# Patient Record
Sex: MALE | Race: WHITE | Employment: OTHER | URBAN - METROPOLITAN AREA
[De-identification: names, ages, dates, MRNs, and addresses within clinical notes are randomized per-mention and may not be internally consistent; named-entity substitution may affect disease eponyms.]

---

## 2017-03-28 ENCOUNTER — COMMUNICATION - HEALTHEAST (OUTPATIENT)
Dept: CARDIOLOGY | Facility: CLINIC | Age: 77
End: 2017-03-28

## 2017-03-28 DIAGNOSIS — I48.91 ATRIAL FIBRILLATION (H): ICD-10-CM

## 2017-05-09 ENCOUNTER — AMBULATORY - HEALTHEAST (OUTPATIENT)
Dept: CARDIOLOGY | Facility: CLINIC | Age: 77
End: 2017-05-09

## 2017-05-12 ENCOUNTER — OFFICE VISIT - HEALTHEAST (OUTPATIENT)
Dept: CARDIOLOGY | Facility: CLINIC | Age: 77
End: 2017-05-12

## 2017-05-12 ENCOUNTER — AMBULATORY - HEALTHEAST (OUTPATIENT)
Dept: CARDIOLOGY | Facility: CLINIC | Age: 77
End: 2017-05-12

## 2017-05-12 DIAGNOSIS — I25.10 CAD (CORONARY ARTERY DISEASE): ICD-10-CM

## 2017-05-12 LAB
ATRIAL RATE - MUSE: 394 BPM
DIASTOLIC BLOOD PRESSURE - MUSE: NORMAL MMHG
INTERPRETATION ECG - MUSE: NORMAL
P AXIS - MUSE: NORMAL DEGREES
PR INTERVAL - MUSE: NORMAL MS
QRS DURATION - MUSE: 138 MS
QT - MUSE: 524 MS
QTC - MUSE: 468 MS
R AXIS - MUSE: -34 DEGREES
SYSTOLIC BLOOD PRESSURE - MUSE: NORMAL MMHG
T AXIS - MUSE: 10 DEGREES
VENTRICULAR RATE- MUSE: 48 BPM

## 2017-05-12 ASSESSMENT — MIFFLIN-ST. JEOR: SCORE: 1828.9

## 2017-05-22 ENCOUNTER — HOSPITAL ENCOUNTER (OUTPATIENT)
Dept: NUCLEAR MEDICINE | Facility: HOSPITAL | Age: 77
Discharge: HOME | End: 2017-05-22
Attending: INTERNAL MEDICINE

## 2017-05-22 ENCOUNTER — HOSPITAL ENCOUNTER (OUTPATIENT)
Dept: CARDIOLOGY | Facility: HOSPITAL | Age: 77
Discharge: HOME | End: 2017-05-22
Attending: INTERNAL MEDICINE

## 2017-05-22 DIAGNOSIS — I25.10 CAD (CORONARY ARTERY DISEASE): ICD-10-CM

## 2017-05-22 LAB
CV STRESS CURRENT BP HE: NORMAL
CV STRESS CURRENT HR HE: 57
CV STRESS CURRENT HR HE: 62
CV STRESS CURRENT HR HE: 64
CV STRESS CURRENT HR HE: 65
CV STRESS CURRENT HR HE: 66
CV STRESS CURRENT HR HE: 67
CV STRESS CURRENT HR HE: 68
CV STRESS CURRENT HR HE: 68
CV STRESS CURRENT HR HE: 69
CV STRESS CURRENT HR HE: 70
CV STRESS CURRENT HR HE: 70
CV STRESS CURRENT HR HE: 74
CV STRESS CURRENT HR HE: 74
CV STRESS CURRENT HR HE: 75
CV STRESS CURRENT HR HE: 77
CV STRESS CURRENT HR HE: 78
CV STRESS DEVIATION TIME HE: NORMAL
CV STRESS ECHO PERCENT HR HE: NORMAL
CV STRESS EXERCISE STAGE HE: NORMAL
CV STRESS FINAL RESTING BP HE: NORMAL
CV STRESS FINAL RESTING HR HE: 69
CV STRESS MAX HR HE: 79
CV STRESS MAX TREADMILL GRADE HE: 0
CV STRESS MAX TREADMILL SPEED HE: 0
CV STRESS PEAK DIA BP HE: NORMAL
CV STRESS PEAK SYS BP HE: NORMAL
CV STRESS PHASE HE: NORMAL
CV STRESS PROTOCOL HE: NORMAL
CV STRESS RESTING PT POSITION HE: NORMAL
CV STRESS ST DEVIATION AMOUNT HE: NORMAL
CV STRESS ST DEVIATION ELEVATION HE: NORMAL
CV STRESS ST EVELATION AMOUNT HE: NORMAL
CV STRESS TEST TYPE HE: NORMAL
CV STRESS TOTAL STAGE TIME MIN 1 HE: NORMAL
NUC STRESS EJECTION FRACTION: 53 %
STRESS ECHO BASELINE BP: NORMAL
STRESS ECHO BASELINE HR: 66
STRESS ECHO CALCULATED PERCENT HR: 56 %
STRESS ECHO LAST STRESS BP: NORMAL
STRESS ECHO LAST STRESS HR: 74

## 2017-06-08 ENCOUNTER — OFFICE VISIT - HEALTHEAST (OUTPATIENT)
Dept: CARDIOLOGY | Facility: CLINIC | Age: 77
End: 2017-06-08

## 2017-06-08 DIAGNOSIS — I42.9 CARDIOMYOPATHY (H): ICD-10-CM

## 2017-06-08 DIAGNOSIS — I48.21 PERMANENT ATRIAL FIBRILLATION (H): ICD-10-CM

## 2017-06-08 DIAGNOSIS — I25.10 ATHEROSCLEROSIS OF NATIVE CORONARY ARTERY OF NATIVE HEART, ANGINA PRESENCE UNSPECIFIED: ICD-10-CM

## 2017-06-08 DIAGNOSIS — I10 ESSENTIAL HYPERTENSION WITH GOAL BLOOD PRESSURE LESS THAN 130/85: ICD-10-CM

## 2017-06-08 ASSESSMENT — MIFFLIN-ST. JEOR: SCORE: 1833.44

## 2017-06-16 ENCOUNTER — COMMUNICATION - HEALTHEAST (OUTPATIENT)
Dept: CARDIOLOGY | Facility: CLINIC | Age: 77
End: 2017-06-16

## 2017-08-03 ENCOUNTER — RECORDS - HEALTHEAST (OUTPATIENT)
Dept: LAB | Facility: CLINIC | Age: 77
End: 2017-08-03

## 2017-08-03 LAB
CHOLEST SERPL-MCNC: 118 MG/DL
FASTING STATUS PATIENT QL REPORTED: NORMAL
HDLC SERPL-MCNC: 43 MG/DL
LDLC SERPL CALC-MCNC: 56 MG/DL
PSA SERPL-MCNC: 2.5 NG/ML (ref 0–6.5)
TRIGL SERPL-MCNC: 97 MG/DL

## 2017-09-15 ENCOUNTER — COMMUNICATION - HEALTHEAST (OUTPATIENT)
Dept: CARDIOLOGY | Facility: CLINIC | Age: 77
End: 2017-09-15

## 2017-09-15 DIAGNOSIS — E78.5 HYPERLIPIDEMIA: ICD-10-CM

## 2017-09-20 ENCOUNTER — RECORDS - HEALTHEAST (OUTPATIENT)
Dept: ADMINISTRATIVE | Facility: OTHER | Age: 77
End: 2017-09-20

## 2017-09-20 ENCOUNTER — AMBULATORY - HEALTHEAST (OUTPATIENT)
Dept: CARDIOLOGY | Facility: CLINIC | Age: 77
End: 2017-09-20

## 2017-09-25 ENCOUNTER — OFFICE VISIT - HEALTHEAST (OUTPATIENT)
Dept: CARDIOLOGY | Facility: CLINIC | Age: 77
End: 2017-09-25

## 2017-09-25 DIAGNOSIS — I48.19 PERSISTENT ATRIAL FIBRILLATION (H): ICD-10-CM

## 2017-09-25 DIAGNOSIS — I25.10 CAD (CORONARY ARTERY DISEASE): ICD-10-CM

## 2017-09-25 DIAGNOSIS — I25.10 ATHEROSCLEROSIS OF NATIVE CORONARY ARTERY OF NATIVE HEART, ANGINA PRESENCE UNSPECIFIED: ICD-10-CM

## 2017-09-25 DIAGNOSIS — E78.5 HYPERLIPIDEMIA LDL GOAL <70: ICD-10-CM

## 2017-09-25 LAB
ATRIAL RATE - MUSE: 38 BPM
DIASTOLIC BLOOD PRESSURE - MUSE: NORMAL MMHG
INTERPRETATION ECG - MUSE: NORMAL
P AXIS - MUSE: NORMAL DEGREES
PR INTERVAL - MUSE: NORMAL MS
QRS DURATION - MUSE: 136 MS
QT - MUSE: 496 MS
QTC - MUSE: 482 MS
R AXIS - MUSE: -37 DEGREES
SYSTOLIC BLOOD PRESSURE - MUSE: NORMAL MMHG
T AXIS - MUSE: 17 DEGREES
VENTRICULAR RATE- MUSE: 57 BPM

## 2017-09-25 ASSESSMENT — MIFFLIN-ST. JEOR: SCORE: 1878.8

## 2017-10-02 ENCOUNTER — HOSPITAL ENCOUNTER (OUTPATIENT)
Dept: CARDIOLOGY | Facility: HOSPITAL | Age: 77
Discharge: HOME | End: 2017-10-02
Attending: INTERNAL MEDICINE

## 2017-10-02 DIAGNOSIS — I48.19 PERSISTENT ATRIAL FIBRILLATION (H): ICD-10-CM

## 2017-10-17 ENCOUNTER — COMMUNICATION - HEALTHEAST (OUTPATIENT)
Dept: CARDIOLOGY | Facility: CLINIC | Age: 77
End: 2017-10-17

## 2017-10-17 DIAGNOSIS — I48.91 ATRIAL FIBRILLATION (H): ICD-10-CM

## 2017-10-24 ENCOUNTER — COMMUNICATION - HEALTHEAST (OUTPATIENT)
Dept: CARDIOLOGY | Facility: CLINIC | Age: 77
End: 2017-10-24

## 2017-11-02 ENCOUNTER — COMMUNICATION - HEALTHEAST (OUTPATIENT)
Dept: CARDIOLOGY | Facility: CLINIC | Age: 77
End: 2017-11-02

## 2017-11-02 DIAGNOSIS — I25.10 CAD (CORONARY ARTERY DISEASE): ICD-10-CM

## 2017-11-13 ENCOUNTER — COMMUNICATION - HEALTHEAST (OUTPATIENT)
Dept: CARDIOLOGY | Facility: CLINIC | Age: 77
End: 2017-11-13

## 2017-11-13 DIAGNOSIS — I25.10 CORONARY ATHEROSCLEROSIS: ICD-10-CM

## 2018-03-19 ENCOUNTER — COMMUNICATION - HEALTHEAST (OUTPATIENT)
Dept: CARDIOLOGY | Facility: CLINIC | Age: 78
End: 2018-03-19

## 2018-03-19 DIAGNOSIS — E78.5 HYPERLIPIDEMIA: ICD-10-CM

## 2018-04-16 ENCOUNTER — COMMUNICATION - HEALTHEAST (OUTPATIENT)
Dept: CARDIOLOGY | Facility: CLINIC | Age: 78
End: 2018-04-16

## 2018-04-16 DIAGNOSIS — I48.91 ATRIAL FIBRILLATION (H): ICD-10-CM

## 2018-06-05 ENCOUNTER — RECORDS - HEALTHEAST (OUTPATIENT)
Dept: ADMINISTRATIVE | Facility: OTHER | Age: 78
End: 2018-06-05

## 2018-06-07 ENCOUNTER — OFFICE VISIT - HEALTHEAST (OUTPATIENT)
Dept: CARDIOLOGY | Facility: CLINIC | Age: 78
End: 2018-06-07

## 2018-06-07 DIAGNOSIS — I50.32 CHF (CONGESTIVE HEART FAILURE), NYHA CLASS II, CHRONIC, DIASTOLIC (H): ICD-10-CM

## 2018-06-07 DIAGNOSIS — I25.10 CAD (CORONARY ARTERY DISEASE): ICD-10-CM

## 2018-06-07 DIAGNOSIS — R60.0 LOWER EXTREMITY EDEMA: ICD-10-CM

## 2018-06-07 LAB
ANION GAP SERPL CALCULATED.3IONS-SCNC: 10 MMOL/L (ref 5–18)
BNP SERPL-MCNC: 277 PG/ML (ref 0–84)
BUN SERPL-MCNC: 14 MG/DL (ref 8–28)
CALCIUM SERPL-MCNC: 9.6 MG/DL (ref 8.5–10.5)
CHLORIDE BLD-SCNC: 106 MMOL/L (ref 98–107)
CO2 SERPL-SCNC: 23 MMOL/L (ref 22–31)
CREAT SERPL-MCNC: 0.93 MG/DL (ref 0.7–1.3)
GFR SERPL CREATININE-BSD FRML MDRD: >60 ML/MIN/1.73M2
GLUCOSE BLD-MCNC: 102 MG/DL (ref 70–125)
MAGNESIUM SERPL-MCNC: 2.1 MG/DL (ref 1.8–2.6)
POTASSIUM BLD-SCNC: 4.7 MMOL/L (ref 3.5–5)
SODIUM SERPL-SCNC: 139 MMOL/L (ref 136–145)

## 2018-06-07 ASSESSMENT — MIFFLIN-ST. JEOR: SCORE: 1865.19

## 2018-06-08 ENCOUNTER — AMBULATORY - HEALTHEAST (OUTPATIENT)
Dept: CARDIOLOGY | Facility: CLINIC | Age: 78
End: 2018-06-08

## 2018-06-08 DIAGNOSIS — I50.23 ACUTE ON CHRONIC SYSTOLIC CONGESTIVE HEART FAILURE (H): ICD-10-CM

## 2018-06-08 DIAGNOSIS — I42.9 CARDIOMYOPATHY (H): ICD-10-CM

## 2018-06-15 ENCOUNTER — RECORDS - HEALTHEAST (OUTPATIENT)
Dept: LAB | Facility: CLINIC | Age: 78
End: 2018-06-15

## 2018-06-15 LAB
ALBUMIN SERPL-MCNC: 4 G/DL (ref 3.5–5)
ALP SERPL-CCNC: 93 U/L (ref 45–120)
ALT SERPL W P-5'-P-CCNC: 21 U/L (ref 0–45)
ANION GAP SERPL CALCULATED.3IONS-SCNC: 7 MMOL/L (ref 5–18)
AST SERPL W P-5'-P-CCNC: 24 U/L (ref 0–40)
BILIRUB SERPL-MCNC: 2.2 MG/DL (ref 0–1)
BUN SERPL-MCNC: 17 MG/DL (ref 8–28)
CALCIUM SERPL-MCNC: 9.5 MG/DL (ref 8.5–10.5)
CHLORIDE BLD-SCNC: 102 MMOL/L (ref 98–107)
CHOLEST SERPL-MCNC: 126 MG/DL
CO2 SERPL-SCNC: 26 MMOL/L (ref 22–31)
CREAT SERPL-MCNC: 1.05 MG/DL (ref 0.7–1.3)
FASTING STATUS PATIENT QL REPORTED: NORMAL
GFR SERPL CREATININE-BSD FRML MDRD: >60 ML/MIN/1.73M2
GLUCOSE BLD-MCNC: 133 MG/DL (ref 70–125)
HDLC SERPL-MCNC: 52 MG/DL
LDLC SERPL CALC-MCNC: 55 MG/DL
POTASSIUM BLD-SCNC: 4.6 MMOL/L (ref 3.5–5)
PROT SERPL-MCNC: 7.6 G/DL (ref 6–8)
PSA SERPL-MCNC: 2.8 NG/ML (ref 0–6.5)
SODIUM SERPL-SCNC: 135 MMOL/L (ref 136–145)
TRIGL SERPL-MCNC: 93 MG/DL
TSH SERPL DL<=0.005 MIU/L-ACNC: 2.69 UIU/ML (ref 0.3–5)

## 2018-06-19 ENCOUNTER — AMBULATORY - HEALTHEAST (OUTPATIENT)
Dept: CARDIOLOGY | Facility: CLINIC | Age: 78
End: 2018-06-19

## 2018-06-19 ENCOUNTER — OFFICE VISIT - HEALTHEAST (OUTPATIENT)
Dept: CARDIOLOGY | Facility: CLINIC | Age: 78
End: 2018-06-19

## 2018-06-19 DIAGNOSIS — I48.19 PERSISTENT ATRIAL FIBRILLATION (H): ICD-10-CM

## 2018-06-19 DIAGNOSIS — I42.9 CARDIOMYOPATHY (H): ICD-10-CM

## 2018-06-19 DIAGNOSIS — I50.23 ACUTE ON CHRONIC SYSTOLIC CONGESTIVE HEART FAILURE (H): ICD-10-CM

## 2018-06-19 DIAGNOSIS — I50.32 CHRONIC DIASTOLIC CONGESTIVE HEART FAILURE (H): ICD-10-CM

## 2018-06-19 DIAGNOSIS — I50.32 CHF (CONGESTIVE HEART FAILURE), NYHA CLASS II, CHRONIC, DIASTOLIC (H): ICD-10-CM

## 2018-06-19 DIAGNOSIS — I10 ESSENTIAL HYPERTENSION: ICD-10-CM

## 2018-06-19 LAB — MAGNESIUM SERPL-MCNC: 1.9 MG/DL (ref 1.8–2.6)

## 2018-06-19 ASSESSMENT — MIFFLIN-ST. JEOR: SCORE: 1871.99

## 2018-06-27 ENCOUNTER — COMMUNICATION - HEALTHEAST (OUTPATIENT)
Dept: CARDIOLOGY | Facility: CLINIC | Age: 78
End: 2018-06-27

## 2018-06-27 DIAGNOSIS — R06.02 SOB (SHORTNESS OF BREATH): ICD-10-CM

## 2018-06-27 DIAGNOSIS — R53.83 FATIGUE: ICD-10-CM

## 2018-06-27 DIAGNOSIS — I48.91 A-FIB (H): ICD-10-CM

## 2018-07-18 ENCOUNTER — HOSPITAL ENCOUNTER (OUTPATIENT)
Dept: CARDIOLOGY | Facility: HOSPITAL | Age: 78
Discharge: HOME | End: 2018-07-18
Attending: NURSE PRACTITIONER

## 2018-07-18 DIAGNOSIS — R06.02 SOB (SHORTNESS OF BREATH): ICD-10-CM

## 2018-07-18 DIAGNOSIS — R53.83 FATIGUE: ICD-10-CM

## 2018-07-18 DIAGNOSIS — I48.91 A-FIB (H): ICD-10-CM

## 2018-07-18 ASSESSMENT — MIFFLIN-ST. JEOR: SCORE: 1871.99

## 2018-07-19 LAB
AORTIC ROOT: 3.3 CM
AORTIC VALVE MEAN VELOCITY: 82.8 CM/S
AV CUSP SEPERATION: 2 CM
AV CUSP SEPERATION: 2 CM
AV DIMENSIONLESS INDEX VTI: 0.7
AV MEAN GRADIENT: 3 MMHG
AV PEAK GRADIENT: 4.5 MMHG
AV VALVE AREA: 2.5 CM2
BSA FOR ECHO PROCEDURE: 2.4 M2
CV ECHO HEIGHT: 74 IN
CV ECHO WEIGHT: 243 LBS
DOP CALC AO PEAK VEL: 106 CM/S
DOP CALC AO VTI: 22.7 CM
DOP CALC LVOT AREA: 3.8 CM2
DOP CALC LVOT DIAMETER: 2.2 CM
DOP CALC LVOT STROKE VOLUME: 57.4 CM3
DOP CALC MV VTI: 27.1 CM
DOP CALCLVOT PEAK VEL VTI: 15.1 CM
ECHO EJECTION FRACTION ESTIMATED: 40 %
FRACTIONAL SHORTENING: 16.7 % (ref 28–44)
INTERVENTRICULAR SEPTUM IN END DIASTOLE: 1 CM (ref 0.6–1)
IVS/PW RATIO: 1
LA AREA 1: 25.9 CM2
LA AREA 2: 24.6 CM2
LEFT ATRIUM LENGTH: 5.85 CM
LEFT ATRIUM SIZE: 4.9 CM
LEFT ATRIUM VOLUME INDEX: 38.6 ML/M2
LEFT ATRIUM VOLUME: 92.6 ML
LEFT VENTRICLE CARDIAC INDEX: 1.5 L/MIN/M2
LEFT VENTRICLE CARDIAC OUTPUT: 3.5 L/MIN
LEFT VENTRICLE HEART RATE: 61 BPM
LEFT VENTRICLE MASS INDEX: 70.9 G/M2
LEFT VENTRICULAR INTERNAL DIMENSION IN DIASTOLE: 4.8 CM (ref 4.2–5.8)
LEFT VENTRICULAR INTERNAL DIMENSION IN SYSTOLE: 4 CM (ref 2.5–4)
LEFT VENTRICULAR MASS: 170.2 G
LEFT VENTRICULAR OUTFLOW TRACT MEAN GRADIENT: 1 MMHG
LEFT VENTRICULAR OUTFLOW TRACT MEAN VELOCITY: 47.3 CM/S
LEFT VENTRICULAR POSTERIOR WALL IN END DIASTOLE: 1 CM (ref 0.6–1)
LV STROKE VOLUME INDEX: 23.9 ML/M2
MITRAL VALVE DECELERATION SLOPE: 9230 MM/S2
MITRAL VALVE MEAN INFLOW VELOCITY: 51.5 CM/S
MITRAL VALVE PEAK VELOCITY: 138 CM/S
MITRAL VALVE PRESSURE HALF-TIME: 45 MS
MV AREA VTI: 2.12 CM2
MV AVERAGE E/E' RATIO: 10.1 CM/S
MV DECELERATION TIME: 151 MS
MV E'TISSUE VEL-LAT: 13.6 CM/S
MV E'TISSUE VEL-MED: 10.5 CM/S
MV LATERAL E/E' RATIO: 9
MV MEAN GRADIENT: 2 MMHG
MV MEDIAL E/E' RATIO: 11.6
MV PEAK E VELOCITY: 122 CM/S
MV PEAK GRADIENT: 7.6 MMHG
MV VALVE AREA BY CONTINUITY EQUATION: 2.1 CM2
MV VALVE AREA PRESSURE 1/2 METHOD: 4.9 CM2
NUC REST DIASTOLIC VOLUME INDEX: 3888 LBS
NUC REST SYSTOLIC VOLUME INDEX: 74 IN
TRICUSPID REGURGITATION PEAK PRESSURE GRADIENT: 26.6 MMHG
TRICUSPID VALVE PEAK REGURGITANT VELOCITY: 258 CM/S

## 2018-08-10 ENCOUNTER — COMMUNICATION - HEALTHEAST (OUTPATIENT)
Dept: CARDIOLOGY | Facility: CLINIC | Age: 78
End: 2018-08-10

## 2018-08-10 DIAGNOSIS — I25.10 CAD (CORONARY ARTERY DISEASE): ICD-10-CM

## 2018-08-13 ENCOUNTER — AMBULATORY - HEALTHEAST (OUTPATIENT)
Dept: CARDIOLOGY | Facility: CLINIC | Age: 78
End: 2018-08-13

## 2018-08-13 ENCOUNTER — RECORDS - HEALTHEAST (OUTPATIENT)
Dept: ADMINISTRATIVE | Facility: OTHER | Age: 78
End: 2018-08-13

## 2018-08-14 ENCOUNTER — COMMUNICATION - HEALTHEAST (OUTPATIENT)
Dept: CARDIOLOGY | Facility: CLINIC | Age: 78
End: 2018-08-14

## 2018-08-14 DIAGNOSIS — I25.10 CORONARY ATHEROSCLEROSIS: ICD-10-CM

## 2018-08-15 ENCOUNTER — OFFICE VISIT - HEALTHEAST (OUTPATIENT)
Dept: CARDIOLOGY | Facility: CLINIC | Age: 78
End: 2018-08-15

## 2018-08-15 DIAGNOSIS — I10 ESSENTIAL HYPERTENSION: ICD-10-CM

## 2018-08-15 DIAGNOSIS — I50.32 CHRONIC DIASTOLIC CONGESTIVE HEART FAILURE (H): ICD-10-CM

## 2018-08-15 DIAGNOSIS — I42.9 CARDIOMYOPATHY (H): ICD-10-CM

## 2018-08-15 DIAGNOSIS — I48.19 PERSISTENT ATRIAL FIBRILLATION (H): ICD-10-CM

## 2018-08-15 LAB
ANION GAP SERPL CALCULATED.3IONS-SCNC: 10 MMOL/L (ref 5–18)
BUN SERPL-MCNC: 16 MG/DL (ref 8–28)
CALCIUM SERPL-MCNC: 9.8 MG/DL (ref 8.5–10.5)
CHLORIDE BLD-SCNC: 101 MMOL/L (ref 98–107)
CO2 SERPL-SCNC: 23 MMOL/L (ref 22–31)
CREAT SERPL-MCNC: 1.04 MG/DL (ref 0.7–1.3)
GFR SERPL CREATININE-BSD FRML MDRD: >60 ML/MIN/1.73M2
GLUCOSE BLD-MCNC: 109 MG/DL (ref 70–125)
MAGNESIUM SERPL-MCNC: 1.9 MG/DL (ref 1.8–2.6)
POTASSIUM BLD-SCNC: 4.9 MMOL/L (ref 3.5–5)
SODIUM SERPL-SCNC: 134 MMOL/L (ref 136–145)

## 2018-08-15 ASSESSMENT — MIFFLIN-ST. JEOR: SCORE: 1828.9

## 2018-08-16 ENCOUNTER — COMMUNICATION - HEALTHEAST (OUTPATIENT)
Dept: CARDIOLOGY | Facility: CLINIC | Age: 78
End: 2018-08-16

## 2018-08-17 ENCOUNTER — COMMUNICATION - HEALTHEAST (OUTPATIENT)
Dept: CARDIOLOGY | Facility: CLINIC | Age: 78
End: 2018-08-17

## 2018-10-14 ENCOUNTER — COMMUNICATION - HEALTHEAST (OUTPATIENT)
Dept: CARDIOLOGY | Facility: CLINIC | Age: 78
End: 2018-10-14

## 2018-10-14 DIAGNOSIS — I48.91 ATRIAL FIBRILLATION (H): ICD-10-CM

## 2018-10-18 ENCOUNTER — COMMUNICATION - HEALTHEAST (OUTPATIENT)
Dept: CARDIOLOGY | Facility: CLINIC | Age: 78
End: 2018-10-18

## 2018-10-26 ENCOUNTER — RECORDS - HEALTHEAST (OUTPATIENT)
Dept: ADMINISTRATIVE | Facility: OTHER | Age: 78
End: 2018-10-26

## 2018-12-05 ENCOUNTER — COMMUNICATION - HEALTHEAST (OUTPATIENT)
Dept: CARDIOLOGY | Facility: CLINIC | Age: 78
End: 2018-12-05

## 2018-12-05 DIAGNOSIS — I25.10 CAD (CORONARY ARTERY DISEASE): ICD-10-CM

## 2018-12-05 RX ORDER — NITROGLYCERIN 0.4 MG/1
TABLET SUBLINGUAL
Qty: 3 BOTTLE | Refills: 3 | Status: SHIPPED | OUTPATIENT
Start: 2018-12-05 | End: 2022-07-28

## 2018-12-16 ENCOUNTER — COMMUNICATION - HEALTHEAST (OUTPATIENT)
Dept: CARDIOLOGY | Facility: CLINIC | Age: 78
End: 2018-12-16

## 2018-12-16 DIAGNOSIS — E78.5 HYPERLIPIDEMIA: ICD-10-CM

## 2018-12-17 ENCOUNTER — COMMUNICATION - HEALTHEAST (OUTPATIENT)
Dept: CARDIOLOGY | Facility: CLINIC | Age: 78
End: 2018-12-17

## 2018-12-17 DIAGNOSIS — E78.00 HYPERCHOLESTEROLEMIA: ICD-10-CM

## 2019-01-30 ENCOUNTER — COMMUNICATION - HEALTHEAST (OUTPATIENT)
Dept: CARDIOLOGY | Facility: CLINIC | Age: 79
End: 2019-01-30

## 2019-01-30 DIAGNOSIS — I50.32 CHF (CONGESTIVE HEART FAILURE), NYHA CLASS II, CHRONIC, DIASTOLIC (H): ICD-10-CM

## 2019-02-04 ENCOUNTER — COMMUNICATION - HEALTHEAST (OUTPATIENT)
Dept: CARDIOLOGY | Facility: CLINIC | Age: 79
End: 2019-02-04

## 2019-02-04 DIAGNOSIS — I25.10 CAD (CORONARY ARTERY DISEASE): ICD-10-CM

## 2019-04-15 ENCOUNTER — COMMUNICATION - HEALTHEAST (OUTPATIENT)
Dept: CARDIOLOGY | Facility: CLINIC | Age: 79
End: 2019-04-15

## 2019-04-15 DIAGNOSIS — I48.91 ATRIAL FIBRILLATION (H): ICD-10-CM

## 2019-05-18 ENCOUNTER — COMMUNICATION - HEALTHEAST (OUTPATIENT)
Dept: CARDIOLOGY | Facility: CLINIC | Age: 79
End: 2019-05-18

## 2019-05-18 DIAGNOSIS — I48.91 ATRIAL FIBRILLATION (H): ICD-10-CM

## 2019-05-21 ENCOUNTER — COMMUNICATION - HEALTHEAST (OUTPATIENT)
Dept: CARDIOLOGY | Facility: CLINIC | Age: 79
End: 2019-05-21

## 2019-05-21 DIAGNOSIS — I25.10 CORONARY ATHEROSCLEROSIS: ICD-10-CM

## 2019-05-29 ENCOUNTER — RECORDS - HEALTHEAST (OUTPATIENT)
Dept: LAB | Facility: CLINIC | Age: 79
End: 2019-05-29

## 2019-05-29 LAB
ALBUMIN SERPL-MCNC: 3.8 G/DL (ref 3.5–5)
ALP SERPL-CCNC: 149 U/L (ref 45–120)
ALT SERPL W P-5'-P-CCNC: 23 U/L (ref 0–45)
ANION GAP SERPL CALCULATED.3IONS-SCNC: 11 MMOL/L (ref 5–18)
AST SERPL W P-5'-P-CCNC: 31 U/L (ref 0–40)
BILIRUB SERPL-MCNC: 2.1 MG/DL (ref 0–1)
BNP SERPL-MCNC: 441 PG/ML (ref 0–86)
BUN SERPL-MCNC: 16 MG/DL (ref 8–28)
CALCIUM SERPL-MCNC: 9.2 MG/DL (ref 8.5–10.5)
CHLORIDE BLD-SCNC: 94 MMOL/L (ref 98–107)
CHOLEST SERPL-MCNC: 104 MG/DL
CO2 SERPL-SCNC: 22 MMOL/L (ref 22–31)
CREAT SERPL-MCNC: 0.82 MG/DL (ref 0.7–1.3)
FASTING STATUS PATIENT QL REPORTED: NORMAL
GFR SERPL CREATININE-BSD FRML MDRD: >60 ML/MIN/1.73M2
GLUCOSE BLD-MCNC: 103 MG/DL (ref 70–125)
HDLC SERPL-MCNC: 55 MG/DL
LDLC SERPL CALC-MCNC: 30 MG/DL
POTASSIUM BLD-SCNC: 4.4 MMOL/L (ref 3.5–5)
PROT SERPL-MCNC: 7.1 G/DL (ref 6–8)
SODIUM SERPL-SCNC: 127 MMOL/L (ref 136–145)
TRIGL SERPL-MCNC: 94 MG/DL

## 2019-07-24 ENCOUNTER — RECORDS - HEALTHEAST (OUTPATIENT)
Dept: ADMINISTRATIVE | Facility: OTHER | Age: 79
End: 2019-07-24

## 2019-07-24 ENCOUNTER — AMBULATORY - HEALTHEAST (OUTPATIENT)
Dept: CARDIOLOGY | Facility: CLINIC | Age: 79
End: 2019-07-24

## 2019-07-26 ENCOUNTER — AMBULATORY - HEALTHEAST (OUTPATIENT)
Dept: CARDIOLOGY | Facility: CLINIC | Age: 79
End: 2019-07-26

## 2019-07-26 ENCOUNTER — OFFICE VISIT - HEALTHEAST (OUTPATIENT)
Dept: CARDIOLOGY | Facility: CLINIC | Age: 79
End: 2019-07-26

## 2019-07-26 DIAGNOSIS — I50.32 CHF (CONGESTIVE HEART FAILURE), NYHA CLASS II, CHRONIC, DIASTOLIC (H): ICD-10-CM

## 2019-07-26 DIAGNOSIS — I50.9 CHF (CONGESTIVE HEART FAILURE) (H): ICD-10-CM

## 2019-07-26 DIAGNOSIS — I48.19 PERSISTENT ATRIAL FIBRILLATION (H): ICD-10-CM

## 2019-07-26 DIAGNOSIS — E78.5 HYPERLIPIDEMIA LDL GOAL <70: ICD-10-CM

## 2019-07-26 DIAGNOSIS — I25.10 ATHEROSCLEROSIS OF NATIVE CORONARY ARTERY OF NATIVE HEART WITHOUT ANGINA PECTORIS: ICD-10-CM

## 2019-07-26 LAB
ANION GAP SERPL CALCULATED.3IONS-SCNC: 9 MMOL/L (ref 5–18)
ATRIAL RATE - MUSE: 46 BPM
BNP SERPL-MCNC: 843 PG/ML (ref 0–86)
BUN SERPL-MCNC: 18 MG/DL (ref 8–28)
CALCIUM SERPL-MCNC: 9.7 MG/DL (ref 8.5–10.5)
CHLORIDE BLD-SCNC: 95 MMOL/L (ref 98–107)
CO2 SERPL-SCNC: 25 MMOL/L (ref 22–31)
CREAT SERPL-MCNC: 0.75 MG/DL (ref 0.7–1.3)
DIASTOLIC BLOOD PRESSURE - MUSE: NORMAL MMHG
GFR SERPL CREATININE-BSD FRML MDRD: >60 ML/MIN/1.73M2
GLUCOSE BLD-MCNC: 87 MG/DL (ref 70–125)
INTERPRETATION ECG - MUSE: NORMAL
MAGNESIUM SERPL-MCNC: 1.8 MG/DL (ref 1.8–2.6)
P AXIS - MUSE: NORMAL DEGREES
POTASSIUM BLD-SCNC: 4.5 MMOL/L (ref 3.5–5)
PR INTERVAL - MUSE: NORMAL MS
QRS DURATION - MUSE: 140 MS
QT - MUSE: 524 MS
QTC - MUSE: 496 MS
R AXIS - MUSE: -41 DEGREES
SODIUM SERPL-SCNC: 129 MMOL/L (ref 136–145)
SYSTOLIC BLOOD PRESSURE - MUSE: NORMAL MMHG
T AXIS - MUSE: 70 DEGREES
VENTRICULAR RATE- MUSE: 54 BPM

## 2019-07-26 RX ORDER — CODEINE PHOSPHATE AND GUAIFENESIN 10; 100 MG/5ML; MG/5ML
1 LIQUID ORAL EVERY 6 HOURS PRN
Refills: 2 | Status: SHIPPED | COMMUNITY
Start: 2019-07-16

## 2019-08-05 ENCOUNTER — HOSPITAL ENCOUNTER (OUTPATIENT)
Dept: RADIOLOGY | Facility: HOSPITAL | Age: 79
Discharge: HOME | End: 2019-08-05
Attending: INTERNAL MEDICINE

## 2019-08-05 ENCOUNTER — HOSPITAL ENCOUNTER (OUTPATIENT)
Dept: CARDIOLOGY | Facility: HOSPITAL | Age: 79
Discharge: HOME | End: 2019-08-05
Attending: INTERNAL MEDICINE

## 2019-08-05 ENCOUNTER — HOSPITAL ENCOUNTER (OUTPATIENT)
Dept: NUCLEAR MEDICINE | Facility: HOSPITAL | Age: 79
Discharge: HOME | End: 2019-08-05
Attending: INTERNAL MEDICINE

## 2019-08-05 ENCOUNTER — COMMUNICATION - HEALTHEAST (OUTPATIENT)
Dept: CARDIOLOGY | Facility: CLINIC | Age: 79
End: 2019-08-05

## 2019-08-05 DIAGNOSIS — I50.9 CHF (CONGESTIVE HEART FAILURE) (H): ICD-10-CM

## 2019-08-05 LAB
CV STRESS CURRENT BP HE: NORMAL
CV STRESS CURRENT HR HE: 55
CV STRESS CURRENT HR HE: 55
CV STRESS CURRENT HR HE: 57
CV STRESS CURRENT HR HE: 58
CV STRESS CURRENT HR HE: 59
CV STRESS CURRENT HR HE: 61
CV STRESS CURRENT HR HE: 62
CV STRESS CURRENT HR HE: 62
CV STRESS CURRENT HR HE: 63
CV STRESS CURRENT HR HE: 63
CV STRESS CURRENT HR HE: 64
CV STRESS CURRENT HR HE: 64
CV STRESS CURRENT HR HE: 67
CV STRESS CURRENT HR HE: 68
CV STRESS DEVIATION TIME HE: NORMAL
CV STRESS ECHO PERCENT HR HE: NORMAL
CV STRESS EXERCISE STAGE HE: NORMAL
CV STRESS FINAL RESTING BP HE: NORMAL
CV STRESS FINAL RESTING HR HE: 57
CV STRESS MAX HR HE: 70
CV STRESS MAX TREADMILL GRADE HE: 0
CV STRESS MAX TREADMILL SPEED HE: 0
CV STRESS PEAK DIA BP HE: NORMAL
CV STRESS PEAK SYS BP HE: NORMAL
CV STRESS PHASE HE: NORMAL
CV STRESS PROTOCOL HE: NORMAL
CV STRESS RESTING PT POSITION HE: NORMAL
CV STRESS RESTING PT POSITION HE: NORMAL
CV STRESS ST DEVIATION AMOUNT HE: NORMAL
CV STRESS ST DEVIATION ELEVATION HE: NORMAL
CV STRESS ST EVELATION AMOUNT HE: NORMAL
CV STRESS TEST TYPE HE: NORMAL
CV STRESS TOTAL STAGE TIME MIN 1 HE: NORMAL
NUC STRESS EJECTION FRACTION: 54 %
STRESS ECHO BASELINE BP: NORMAL
STRESS ECHO BASELINE HR: 61
STRESS ECHO CALCULATED PERCENT HR: 50 %
STRESS ECHO LAST STRESS BP: NORMAL
STRESS ECHO LAST STRESS HR: 64

## 2019-08-09 ENCOUNTER — COMMUNICATION - HEALTHEAST (OUTPATIENT)
Dept: CARDIOLOGY | Facility: CLINIC | Age: 79
End: 2019-08-09

## 2019-08-14 ENCOUNTER — COMMUNICATION - HEALTHEAST (OUTPATIENT)
Dept: CARDIOLOGY | Facility: CLINIC | Age: 79
End: 2019-08-14

## 2019-08-14 DIAGNOSIS — I48.91 ATRIAL FIBRILLATION (H): ICD-10-CM

## 2019-08-15 ENCOUNTER — OFFICE VISIT - HEALTHEAST (OUTPATIENT)
Dept: CARDIOLOGY | Facility: CLINIC | Age: 79
End: 2019-08-15

## 2019-08-15 DIAGNOSIS — I48.19 PERSISTENT ATRIAL FIBRILLATION (H): ICD-10-CM

## 2019-08-15 DIAGNOSIS — I50.32 CHRONIC DIASTOLIC CONGESTIVE HEART FAILURE (H): ICD-10-CM

## 2019-08-15 DIAGNOSIS — I10 ESSENTIAL HYPERTENSION: ICD-10-CM

## 2019-08-15 LAB
ANION GAP SERPL CALCULATED.3IONS-SCNC: 9 MMOL/L (ref 5–18)
BNP SERPL-MCNC: 898 PG/ML (ref 0–86)
BUN SERPL-MCNC: 14 MG/DL (ref 8–28)
CALCIUM SERPL-MCNC: 9.2 MG/DL (ref 8.5–10.5)
CHLORIDE BLD-SCNC: 98 MMOL/L (ref 98–107)
CO2 SERPL-SCNC: 27 MMOL/L (ref 22–31)
CREAT SERPL-MCNC: 0.95 MG/DL (ref 0.7–1.3)
GFR SERPL CREATININE-BSD FRML MDRD: >60 ML/MIN/1.73M2
GLUCOSE BLD-MCNC: 105 MG/DL (ref 70–125)
POTASSIUM BLD-SCNC: 4.1 MMOL/L (ref 3.5–5)
SODIUM SERPL-SCNC: 134 MMOL/L (ref 136–145)

## 2019-08-15 ASSESSMENT — MIFFLIN-ST. JEOR: SCORE: 1910.55

## 2019-08-16 ENCOUNTER — AMBULATORY - HEALTHEAST (OUTPATIENT)
Dept: CARDIOLOGY | Facility: CLINIC | Age: 79
End: 2019-08-16

## 2019-08-16 DIAGNOSIS — I50.32 CHF (CONGESTIVE HEART FAILURE), NYHA CLASS II, CHRONIC, DIASTOLIC (H): ICD-10-CM

## 2019-08-28 ENCOUNTER — AMBULATORY - HEALTHEAST (OUTPATIENT)
Dept: CARDIOLOGY | Facility: CLINIC | Age: 79
End: 2019-08-28

## 2019-08-28 DIAGNOSIS — I50.32 CHF (CONGESTIVE HEART FAILURE), NYHA CLASS II, CHRONIC, DIASTOLIC (H): ICD-10-CM

## 2019-08-28 LAB
ANION GAP SERPL CALCULATED.3IONS-SCNC: 8 MMOL/L (ref 5–18)
BUN SERPL-MCNC: 18 MG/DL (ref 8–28)
CALCIUM SERPL-MCNC: 10 MG/DL (ref 8.5–10.5)
CHLORIDE BLD-SCNC: 96 MMOL/L (ref 98–107)
CO2 SERPL-SCNC: 30 MMOL/L (ref 22–31)
CREAT SERPL-MCNC: 1.04 MG/DL (ref 0.7–1.3)
GFR SERPL CREATININE-BSD FRML MDRD: >60 ML/MIN/1.73M2
GLUCOSE BLD-MCNC: 99 MG/DL (ref 70–125)
POTASSIUM BLD-SCNC: 4.1 MMOL/L (ref 3.5–5)
SODIUM SERPL-SCNC: 134 MMOL/L (ref 136–145)

## 2019-09-11 ENCOUNTER — COMMUNICATION - HEALTHEAST (OUTPATIENT)
Dept: CARDIOLOGY | Facility: CLINIC | Age: 79
End: 2019-09-11

## 2019-09-11 DIAGNOSIS — E78.00 HYPERCHOLESTEROLEMIA: ICD-10-CM

## 2019-09-16 ENCOUNTER — OFFICE VISIT - HEALTHEAST (OUTPATIENT)
Dept: CARDIOLOGY | Facility: CLINIC | Age: 79
End: 2019-09-16

## 2019-09-16 DIAGNOSIS — I48.19 PERSISTENT ATRIAL FIBRILLATION (H): ICD-10-CM

## 2019-09-16 DIAGNOSIS — I25.5 ISCHEMIC CARDIOMYOPATHY: ICD-10-CM

## 2019-09-16 DIAGNOSIS — I25.10 ATHEROSCLEROSIS OF NATIVE CORONARY ARTERY OF NATIVE HEART WITHOUT ANGINA PECTORIS: ICD-10-CM

## 2019-09-16 DIAGNOSIS — I50.32 CHF (CONGESTIVE HEART FAILURE), NYHA CLASS II, CHRONIC, DIASTOLIC (H): ICD-10-CM

## 2019-09-16 DIAGNOSIS — I50.32 CHRONIC DIASTOLIC CONGESTIVE HEART FAILURE (H): ICD-10-CM

## 2019-09-16 LAB
ANION GAP SERPL CALCULATED.3IONS-SCNC: 12 MMOL/L (ref 5–18)
BUN SERPL-MCNC: 17 MG/DL (ref 8–28)
CALCIUM SERPL-MCNC: 10.1 MG/DL (ref 8.5–10.5)
CHLORIDE BLD-SCNC: 100 MMOL/L (ref 98–107)
CO2 SERPL-SCNC: 23 MMOL/L (ref 22–31)
CREAT SERPL-MCNC: 0.84 MG/DL (ref 0.7–1.3)
GFR SERPL CREATININE-BSD FRML MDRD: >60 ML/MIN/1.73M2
GLUCOSE BLD-MCNC: 100 MG/DL (ref 70–125)
MAGNESIUM SERPL-MCNC: 1.7 MG/DL (ref 1.8–2.6)
POTASSIUM BLD-SCNC: 4.6 MMOL/L (ref 3.5–5)
SODIUM SERPL-SCNC: 135 MMOL/L (ref 136–145)

## 2019-09-16 ASSESSMENT — MIFFLIN-ST. JEOR: SCORE: 1731.03

## 2019-09-17 LAB — BNP SERPL-MCNC: 186 PG/ML (ref 0–86)

## 2019-09-19 ENCOUNTER — COMMUNICATION - HEALTHEAST (OUTPATIENT)
Dept: CARDIOLOGY | Facility: CLINIC | Age: 79
End: 2019-09-19

## 2019-09-19 DIAGNOSIS — I50.32 CHF (CONGESTIVE HEART FAILURE), NYHA CLASS II, CHRONIC, DIASTOLIC (H): ICD-10-CM

## 2019-09-27 ENCOUNTER — AMBULATORY - HEALTHEAST (OUTPATIENT)
Dept: CARDIOLOGY | Facility: CLINIC | Age: 79
End: 2019-09-27

## 2019-09-27 DIAGNOSIS — I50.32 CHF (CONGESTIVE HEART FAILURE), NYHA CLASS II, CHRONIC, DIASTOLIC (H): ICD-10-CM

## 2019-09-27 LAB
MAGNESIUM SERPL-MCNC: 1.8 MG/DL (ref 1.8–2.6)
POTASSIUM BLD-SCNC: 4.5 MMOL/L (ref 3.5–5)

## 2019-10-07 ENCOUNTER — COMMUNICATION - HEALTHEAST (OUTPATIENT)
Dept: CARDIOLOGY | Facility: CLINIC | Age: 79
End: 2019-10-07

## 2019-10-08 ENCOUNTER — COMMUNICATION - HEALTHEAST (OUTPATIENT)
Dept: CARDIOLOGY | Facility: CLINIC | Age: 79
End: 2019-10-08

## 2019-10-08 DIAGNOSIS — I48.91 ATRIAL FIBRILLATION (H): ICD-10-CM

## 2019-10-11 ENCOUNTER — RECORDS - HEALTHEAST (OUTPATIENT)
Dept: ADMINISTRATIVE | Facility: OTHER | Age: 79
End: 2019-10-11

## 2019-10-16 ENCOUNTER — OFFICE VISIT - HEALTHEAST (OUTPATIENT)
Dept: CARDIOLOGY | Facility: CLINIC | Age: 79
End: 2019-10-16

## 2019-10-16 DIAGNOSIS — I50.32 CHF (CONGESTIVE HEART FAILURE), NYHA CLASS II, CHRONIC, DIASTOLIC (H): ICD-10-CM

## 2019-10-16 DIAGNOSIS — I48.19 PERSISTENT ATRIAL FIBRILLATION (H): ICD-10-CM

## 2019-10-16 DIAGNOSIS — I50.32 CHRONIC DIASTOLIC CONGESTIVE HEART FAILURE (H): ICD-10-CM

## 2019-10-16 DIAGNOSIS — I10 ESSENTIAL HYPERTENSION: ICD-10-CM

## 2019-10-23 ENCOUNTER — RECORDS - HEALTHEAST (OUTPATIENT)
Dept: LAB | Facility: CLINIC | Age: 79
End: 2019-10-23

## 2019-10-23 LAB
ALBUMIN SERPL-MCNC: 3.8 G/DL (ref 3.5–5)
ALP SERPL-CCNC: 111 U/L (ref 45–120)
ALT SERPL W P-5'-P-CCNC: 16 U/L (ref 0–45)
ANION GAP SERPL CALCULATED.3IONS-SCNC: 12 MMOL/L (ref 5–18)
AST SERPL W P-5'-P-CCNC: 21 U/L (ref 0–40)
BILIRUB SERPL-MCNC: 1.7 MG/DL (ref 0–1)
BUN SERPL-MCNC: 15 MG/DL (ref 8–28)
CALCIUM SERPL-MCNC: 9.8 MG/DL (ref 8.5–10.5)
CHLORIDE BLD-SCNC: 100 MMOL/L (ref 98–107)
CHOLEST SERPL-MCNC: 121 MG/DL
CO2 SERPL-SCNC: 25 MMOL/L (ref 22–31)
CREAT SERPL-MCNC: 1.04 MG/DL (ref 0.7–1.3)
FASTING STATUS PATIENT QL REPORTED: NORMAL
GFR SERPL CREATININE-BSD FRML MDRD: >60 ML/MIN/1.73M2
GLUCOSE BLD-MCNC: 94 MG/DL (ref 70–125)
HDLC SERPL-MCNC: 50 MG/DL
LDLC SERPL CALC-MCNC: 42 MG/DL
POTASSIUM BLD-SCNC: 4.2 MMOL/L (ref 3.5–5)
PROT SERPL-MCNC: 7.1 G/DL (ref 6–8)
PSA SERPL-MCNC: 2.3 NG/ML (ref 0–6.5)
SODIUM SERPL-SCNC: 137 MMOL/L (ref 136–145)
TRIGL SERPL-MCNC: 146 MG/DL

## 2019-10-25 ENCOUNTER — COMMUNICATION - HEALTHEAST (OUTPATIENT)
Dept: CARDIOLOGY | Facility: CLINIC | Age: 79
End: 2019-10-25

## 2019-11-16 ENCOUNTER — COMMUNICATION - HEALTHEAST (OUTPATIENT)
Dept: CARDIOLOGY | Facility: CLINIC | Age: 79
End: 2019-11-16

## 2019-11-16 DIAGNOSIS — I25.10 CORONARY ATHEROSCLEROSIS: ICD-10-CM

## 2019-12-02 ENCOUNTER — AMBULATORY - HEALTHEAST (OUTPATIENT)
Dept: CARDIOLOGY | Facility: CLINIC | Age: 79
End: 2019-12-02

## 2019-12-02 ENCOUNTER — RECORDS - HEALTHEAST (OUTPATIENT)
Dept: ADMINISTRATIVE | Facility: OTHER | Age: 79
End: 2019-12-02

## 2019-12-05 ENCOUNTER — OFFICE VISIT - HEALTHEAST (OUTPATIENT)
Dept: CARDIOLOGY | Facility: CLINIC | Age: 79
End: 2019-12-05

## 2019-12-05 DIAGNOSIS — I25.10 ATHEROSCLEROSIS OF NATIVE CORONARY ARTERY OF NATIVE HEART WITHOUT ANGINA PECTORIS: ICD-10-CM

## 2019-12-05 DIAGNOSIS — I50.32 CHRONIC DIASTOLIC CONGESTIVE HEART FAILURE (H): ICD-10-CM

## 2019-12-05 DIAGNOSIS — I48.19 PERSISTENT ATRIAL FIBRILLATION (H): ICD-10-CM

## 2019-12-05 DIAGNOSIS — I25.5 ISCHEMIC CARDIOMYOPATHY: ICD-10-CM

## 2019-12-05 RX ORDER — TRIAMCINOLONE ACETONIDE 1 MG/G
1 CREAM TOPICAL DAILY PRN
Refills: 6 | Status: SHIPPED | COMMUNITY
Start: 2019-11-06 | End: 2021-09-16

## 2019-12-05 ASSESSMENT — MIFFLIN-ST. JEOR: SCORE: 1708.7

## 2019-12-15 ENCOUNTER — COMMUNICATION - HEALTHEAST (OUTPATIENT)
Dept: CARDIOLOGY | Facility: CLINIC | Age: 79
End: 2019-12-15

## 2019-12-15 DIAGNOSIS — I50.32 CHF (CONGESTIVE HEART FAILURE), NYHA CLASS II, CHRONIC, DIASTOLIC (H): ICD-10-CM

## 2020-01-14 ENCOUNTER — COMMUNICATION - HEALTHEAST (OUTPATIENT)
Dept: CARDIOLOGY | Facility: CLINIC | Age: 80
End: 2020-01-14

## 2020-01-21 ENCOUNTER — RECORDS - HEALTHEAST (OUTPATIENT)
Dept: ADMINISTRATIVE | Facility: OTHER | Age: 80
End: 2020-01-21

## 2020-02-23 ENCOUNTER — COMMUNICATION - HEALTHEAST (OUTPATIENT)
Dept: CARDIOLOGY | Facility: CLINIC | Age: 80
End: 2020-02-23

## 2020-02-23 DIAGNOSIS — I25.10 CORONARY ATHEROSCLEROSIS: ICD-10-CM

## 2020-05-05 ENCOUNTER — OFFICE VISIT - HEALTHEAST (OUTPATIENT)
Dept: CARDIOLOGY | Facility: CLINIC | Age: 80
End: 2020-05-05

## 2020-05-05 DIAGNOSIS — I50.32 CHRONIC DIASTOLIC CONGESTIVE HEART FAILURE (H): ICD-10-CM

## 2020-05-05 DIAGNOSIS — I48.19 PERSISTENT ATRIAL FIBRILLATION (H): ICD-10-CM

## 2020-05-05 DIAGNOSIS — I10 ESSENTIAL HYPERTENSION: ICD-10-CM

## 2020-05-06 ENCOUNTER — COMMUNICATION - HEALTHEAST (OUTPATIENT)
Dept: CARDIOLOGY | Facility: CLINIC | Age: 80
End: 2020-05-06

## 2020-05-06 DIAGNOSIS — I50.32 CHF (CONGESTIVE HEART FAILURE), NYHA CLASS II, CHRONIC, DIASTOLIC (H): ICD-10-CM

## 2020-06-08 ENCOUNTER — OFFICE VISIT - HEALTHEAST (OUTPATIENT)
Dept: CARDIOLOGY | Facility: CLINIC | Age: 80
End: 2020-06-08

## 2020-06-08 DIAGNOSIS — I50.9 CHF (CONGESTIVE HEART FAILURE) (H): ICD-10-CM

## 2020-06-09 ENCOUNTER — COMMUNICATION - HEALTHEAST (OUTPATIENT)
Dept: CARDIOLOGY | Facility: CLINIC | Age: 80
End: 2020-06-09

## 2020-06-10 ENCOUNTER — AMBULATORY - HEALTHEAST (OUTPATIENT)
Dept: CARDIOLOGY | Facility: CLINIC | Age: 80
End: 2020-06-10

## 2020-06-10 DIAGNOSIS — I50.9 CHF (CONGESTIVE HEART FAILURE) (H): ICD-10-CM

## 2020-06-10 LAB
ANION GAP SERPL CALCULATED.3IONS-SCNC: 13 MMOL/L (ref 5–18)
BNP SERPL-MCNC: 159 PG/ML (ref 0–88)
BUN SERPL-MCNC: 15 MG/DL (ref 8–28)
CALCIUM SERPL-MCNC: 9.7 MG/DL (ref 8.5–10.5)
CHLORIDE BLD-SCNC: 96 MMOL/L (ref 98–107)
CO2 SERPL-SCNC: 24 MMOL/L (ref 22–31)
CREAT SERPL-MCNC: 1.06 MG/DL (ref 0.7–1.3)
ERYTHROCYTE [DISTWIDTH] IN BLOOD BY AUTOMATED COUNT: 13.4 % (ref 11–14.5)
GFR SERPL CREATININE-BSD FRML MDRD: >60 ML/MIN/1.73M2
GLUCOSE BLD-MCNC: 88 MG/DL (ref 70–125)
HCT VFR BLD AUTO: 38.2 % (ref 40–54)
HGB BLD-MCNC: 12.6 G/DL (ref 14–18)
MAGNESIUM SERPL-MCNC: 1.9 MG/DL (ref 1.8–2.6)
MCH RBC QN AUTO: 34.6 PG (ref 27–34)
MCHC RBC AUTO-ENTMCNC: 33 G/DL (ref 32–36)
MCV RBC AUTO: 105 FL (ref 80–100)
PLATELET # BLD AUTO: 155 THOU/UL (ref 140–440)
PMV BLD AUTO: 10 FL (ref 8.5–12.5)
POTASSIUM BLD-SCNC: 4.3 MMOL/L (ref 3.5–5)
RBC # BLD AUTO: 3.64 MILL/UL (ref 4.4–6.2)
SODIUM SERPL-SCNC: 133 MMOL/L (ref 136–145)
WBC: 6.8 THOU/UL (ref 4–11)

## 2020-06-11 ENCOUNTER — COMMUNICATION - HEALTHEAST (OUTPATIENT)
Dept: CARDIOLOGY | Facility: CLINIC | Age: 80
End: 2020-06-11

## 2020-06-12 ENCOUNTER — COMMUNICATION - HEALTHEAST (OUTPATIENT)
Dept: CARDIOLOGY | Facility: CLINIC | Age: 80
End: 2020-06-12

## 2020-06-12 DIAGNOSIS — I50.32 CHF (CONGESTIVE HEART FAILURE), NYHA CLASS II, CHRONIC, DIASTOLIC (H): ICD-10-CM

## 2020-06-23 ENCOUNTER — COMMUNICATION - HEALTHEAST (OUTPATIENT)
Dept: CARDIOLOGY | Facility: CLINIC | Age: 80
End: 2020-06-23

## 2020-06-23 DIAGNOSIS — E78.00 HYPERCHOLESTEROLEMIA: ICD-10-CM

## 2020-07-08 ENCOUNTER — COMMUNICATION - HEALTHEAST (OUTPATIENT)
Dept: CARDIOLOGY | Facility: CLINIC | Age: 80
End: 2020-07-08

## 2020-07-08 ENCOUNTER — OFFICE VISIT - HEALTHEAST (OUTPATIENT)
Dept: CARDIOLOGY | Facility: CLINIC | Age: 80
End: 2020-07-08

## 2020-07-08 DIAGNOSIS — I50.32 CHRONIC DIASTOLIC CONGESTIVE HEART FAILURE (H): ICD-10-CM

## 2020-07-08 DIAGNOSIS — I10 ESSENTIAL HYPERTENSION: ICD-10-CM

## 2020-07-08 DIAGNOSIS — I48.19 PERSISTENT ATRIAL FIBRILLATION (H): ICD-10-CM

## 2020-07-08 DIAGNOSIS — I48.91 ATRIAL FIBRILLATION (H): ICD-10-CM

## 2020-08-22 ENCOUNTER — COMMUNICATION - HEALTHEAST (OUTPATIENT)
Dept: CARDIOLOGY | Facility: CLINIC | Age: 80
End: 2020-08-22

## 2020-08-22 DIAGNOSIS — I25.10 CORONARY ATHEROSCLEROSIS: ICD-10-CM

## 2020-10-09 ENCOUNTER — COMMUNICATION - HEALTHEAST (OUTPATIENT)
Dept: CARDIOLOGY | Facility: CLINIC | Age: 80
End: 2020-10-09

## 2020-10-09 DIAGNOSIS — I50.32 CHF (CONGESTIVE HEART FAILURE), NYHA CLASS II, CHRONIC, DIASTOLIC (H): ICD-10-CM

## 2020-10-12 ENCOUNTER — COMMUNICATION - HEALTHEAST (OUTPATIENT)
Dept: CARDIOLOGY | Facility: CLINIC | Age: 80
End: 2020-10-12

## 2020-10-12 DIAGNOSIS — I50.32 CHF (CONGESTIVE HEART FAILURE), NYHA CLASS II, CHRONIC, DIASTOLIC (H): ICD-10-CM

## 2020-11-06 ENCOUNTER — COMMUNICATION - HEALTHEAST (OUTPATIENT)
Dept: CARDIOLOGY | Facility: CLINIC | Age: 80
End: 2020-11-06

## 2020-11-06 DIAGNOSIS — I48.91 ATRIAL FIBRILLATION (H): ICD-10-CM

## 2020-11-06 RX ORDER — ATENOLOL 100 MG/1
TABLET ORAL
Qty: 180 CAPSULE | Refills: 2 | Status: SHIPPED | OUTPATIENT
Start: 2020-11-06 | End: 2021-08-05

## 2020-12-05 ENCOUNTER — COMMUNICATION - HEALTHEAST (OUTPATIENT)
Dept: CARDIOLOGY | Facility: CLINIC | Age: 80
End: 2020-12-05

## 2020-12-05 DIAGNOSIS — I50.32 CHF (CONGESTIVE HEART FAILURE), NYHA CLASS II, CHRONIC, DIASTOLIC (H): ICD-10-CM

## 2021-01-04 ENCOUNTER — OFFICE VISIT - HEALTHEAST (OUTPATIENT)
Dept: CARDIOLOGY | Facility: CLINIC | Age: 81
End: 2021-01-04

## 2021-01-04 DIAGNOSIS — I48.19 PERSISTENT ATRIAL FIBRILLATION (H): ICD-10-CM

## 2021-01-04 DIAGNOSIS — I10 ESSENTIAL HYPERTENSION: ICD-10-CM

## 2021-01-04 DIAGNOSIS — I25.10 ATHEROSCLEROSIS OF NATIVE CORONARY ARTERY OF NATIVE HEART WITHOUT ANGINA PECTORIS: ICD-10-CM

## 2021-01-04 DIAGNOSIS — I50.32 CHRONIC DIASTOLIC CONGESTIVE HEART FAILURE (H): ICD-10-CM

## 2021-01-04 DIAGNOSIS — I25.10 CAD (CORONARY ARTERY DISEASE): ICD-10-CM

## 2021-01-04 DIAGNOSIS — E78.5 HYPERLIPIDEMIA LDL GOAL <70: ICD-10-CM

## 2021-01-08 ENCOUNTER — COMMUNICATION - HEALTHEAST (OUTPATIENT)
Dept: CARDIOLOGY | Facility: CLINIC | Age: 81
End: 2021-01-08

## 2021-02-19 ENCOUNTER — COMMUNICATION - HEALTHEAST (OUTPATIENT)
Dept: CARDIOLOGY | Facility: CLINIC | Age: 81
End: 2021-02-19

## 2021-02-19 DIAGNOSIS — I25.10 CORONARY ATHEROSCLEROSIS: ICD-10-CM

## 2021-03-29 ENCOUNTER — COMMUNICATION - HEALTHEAST (OUTPATIENT)
Dept: CARDIOLOGY | Facility: CLINIC | Age: 81
End: 2021-03-29

## 2021-03-29 DIAGNOSIS — E78.00 HYPERCHOLESTEROLEMIA: ICD-10-CM

## 2021-03-29 RX ORDER — SIMVASTATIN 40 MG
TABLET ORAL
Qty: 90 TABLET | Refills: 2 | Status: SHIPPED | OUTPATIENT
Start: 2021-03-29 | End: 2022-01-03

## 2021-04-12 ENCOUNTER — COMMUNICATION - HEALTHEAST (OUTPATIENT)
Dept: CARDIOLOGY | Facility: CLINIC | Age: 81
End: 2021-04-12

## 2021-04-12 DIAGNOSIS — I50.32 CHF (CONGESTIVE HEART FAILURE), NYHA CLASS II, CHRONIC, DIASTOLIC (H): ICD-10-CM

## 2021-04-12 RX ORDER — FUROSEMIDE 40 MG
40 TABLET ORAL
Qty: 180 TABLET | Refills: 0 | Status: SHIPPED | OUTPATIENT
Start: 2021-04-12 | End: 2021-07-20

## 2021-05-25 ENCOUNTER — COMMUNICATION - HEALTHEAST (OUTPATIENT)
Dept: CARDIOLOGY | Facility: CLINIC | Age: 81
End: 2021-05-25

## 2021-05-25 DIAGNOSIS — I25.10 CORONARY ATHEROSCLEROSIS: ICD-10-CM

## 2021-05-25 RX ORDER — LOSARTAN POTASSIUM 50 MG/1
50 TABLET ORAL DAILY
Qty: 90 TABLET | Refills: 0 | Status: SHIPPED | OUTPATIENT
Start: 2021-05-25 | End: 2021-08-25

## 2021-05-28 ENCOUNTER — COMMUNICATION - HEALTHEAST (OUTPATIENT)
Dept: ADMINISTRATIVE | Facility: CLINIC | Age: 81
End: 2021-05-28

## 2021-05-30 VITALS — WEIGHT: 237 LBS | BODY MASS INDEX: 31.41 KG/M2 | HEIGHT: 73 IN

## 2021-05-30 NOTE — PATIENT INSTRUCTIONS - HE
Nice to see you again.we are going to plan a chest xray, 24 monitor and nuclear stress test.i will be in touch with the test results.Please call my nurse with any questions or worsening symptoms.Her number is [phone].Please weigh yourself daily and watch the salt in your diet.

## 2021-05-30 NOTE — PROGRESS NOTES
Furosemide updated.  -[facility]    Notes recorded by Gelacio Martinez MD on 7/26/2019 at 3:46 PM CDT  I only see the BNP which is strange because I would have expected the BMP to come back.  Lets have him increase the furosemide to 40 mg daily from 20 mg daily and monitor his weights and update us with how he is doing with this higher dose by Monday or Tuesday.  Will make additional comments once the additional lab results are back.  ty mdg

## 2021-05-31 ENCOUNTER — RECORDS - HEALTHEAST (OUTPATIENT)
Dept: ADMINISTRATIVE | Facility: CLINIC | Age: 81
End: 2021-05-31

## 2021-05-31 VITALS — BODY MASS INDEX: 32.87 KG/M2 | WEIGHT: 248 LBS | HEIGHT: 73 IN

## 2021-05-31 VITALS — HEIGHT: 73 IN | BODY MASS INDEX: 31.54 KG/M2 | WEIGHT: 238 LBS

## 2021-05-31 NOTE — TELEPHONE ENCOUNTER
Discussed with patient option to move appointment up sooner, options limited to only Monday 7:50am [facility] [place] w/Lauren which patient declined.   Carvedilol dc'd per orders, reviewed with patient on the phone in detail.     Confirmed Appt on Thursday Aug 15th 3:30pm with Lauren Adame CNP at [facility]     sk/RN

## 2021-05-31 NOTE — TELEPHONE ENCOUNTER
----- Message from Lauren Pavon sent at 8/5/2019  8:18 AM CDT -----  Contact: Pt  General phone call:    Caller: Pt  Primary cardiologist: Juan  Detailed reason for call: Pt returning call he received stating to speak w/Dr Martinez's nurse. Please call back  Best phone number: [phone]  Best time to contact: Any  Ok to leave a detailed message? Yes  Device? No    Additional Info:

## 2021-05-31 NOTE — TELEPHONE ENCOUNTER
Furosemide increased to 40 mg daily 7/26/19.  Pt has noticed no change so far - scale is not working so he is unable to weigh himself.  Shortness of breath is the same.  Pt is having holter, nuclear stress test, and chest xray today.  -kenisha

## 2021-05-31 NOTE — TELEPHONE ENCOUNTER
I called him and reviewed the Holter monitor results.  Just received the results.  He has a trend towards lower heart rate and atrial fibrillation has no specific dizziness but does have shortness of breath with exertion.  We doubled up on his Lasix and he believes his swelling is a little bit better but has not been weighing himself because he does not have a working scale.  I recommended that he hold the carvedilol and that he update us with any increased symptoms.  We might need to have him visit with EP regarding question whether pacemaker would be of any benefit although he had no dramatic pauses I think first order business is to see how he does off carvedilol.  He has an appointment this upcoming Thursday in the nurse practitioner clinic and I would ask if there is any chance that he could be seen sooner either by 1 of the nurse practitioners or even rapid access clinic early next week. lease investigate that possibility and update me.  Thank you mdg

## 2021-05-31 NOTE — PATIENT INSTRUCTIONS - HE
Ashok Garcia,    It was a pleasure to see you today at the [facility].     My recommendations after this visit include:  - Please follow up with Dr Martinez September 16   - Please follow up with Lauren Adame in 2 months  - I have checked labs and will contact you with results  - No changes to your medications    Lauren Adame, CNP

## 2021-05-31 NOTE — TELEPHONE ENCOUNTER
----- Message from Gelacio Martinez MD sent at 8/5/2019  9:34 PM CDT -----  Stress test looks ok, can we ask him how the fluid is doing?  mdg    ----- Message from Gelacio Martinez MD sent at 8/5/2019 11:35 AM CDT -----  X-ray shows some volume overload.  He recently increased his furosemide and wanted to know how he was doing with respect to his weights and shortness of breath.  He did not answer the phone last evening.  I am hoping we can get an answer to how he is doing with respect to his weights and fluid retention and he should be seen in follow-up in the CHF/chime clinic  mdg

## 2021-05-31 NOTE — TELEPHONE ENCOUNTER
Patient calling today to inquire about holter monitor results.   He was advised that the results were pending review by Dr. Martinez and that once this had been completed we would contact him with recommendations sk/DAVI Martinez please review thank you sk/DAVI

## 2021-05-31 NOTE — TELEPHONE ENCOUNTER
Okay we tried thank you, maybe we can make a note to call him on Monday to see how he is doing with holding of the carvedilol.  I am out of the office next week.mdg

## 2021-05-31 NOTE — TELEPHONE ENCOUNTER
----- Message from Lily Delacruz sent at 8/9/2019 10:16 AM CDT -----  Contact: PATIENT  General phone call:  CALLING FOR TEST RESULTS. PLEASE CALL  Caller: PATIENT  Primary cardiologist: JON  Detailed reason for call: SEE ABOVE  New or active symptoms? NO  Best phone number: [phone]  Best time to contact: ANYTIME  Ok to leave a detailed message? YES  Device? NO    Additional Info:

## 2021-05-31 NOTE — TELEPHONE ENCOUNTER
"Pt states he \"can tell a difference\" increasing furosemide and stopping carvedilol.  Pt reports he is voiding a lot and \"always in the bathroom.\"  Pt also notes shortness of breath with exertion is still present, and instead of being able to recover in 3 minutes, it is taking 4-5 min to recover.      Update sent to Dr Martinez, and pt will f/u with DCB 8/15/19.  -ra  "

## 2021-06-01 VITALS — BODY MASS INDEX: 31.18 KG/M2 | HEIGHT: 74 IN | WEIGHT: 243 LBS

## 2021-06-01 VITALS — BODY MASS INDEX: 32.47 KG/M2 | WEIGHT: 245 LBS | HEIGHT: 73 IN

## 2021-06-01 VITALS — HEIGHT: 74 IN | BODY MASS INDEX: 31.18 KG/M2 | WEIGHT: 243 LBS

## 2021-06-01 VITALS — WEIGHT: 237 LBS | HEIGHT: 73 IN | BODY MASS INDEX: 31.41 KG/M2

## 2021-06-01 NOTE — TELEPHONE ENCOUNTER
Results and recommendations relayed to pt.  Pt verbalized understanding and had no questions.  Magnesium oxide ordered, mag and potassium lab ordered.  Pt will call back to schedule lab only appt when he sets a ride up.  -ra    Notes recorded by Gelacio Martinez MD on 9/18/2019 at 9:28 PM CDT  bnp significantly improved suggesting improvement in volume overload similar to recent exam, he wanted to try to  cut back on the furosemide. He can try 40mg in the am and 20mg early afternoon instead of 40/40 but needs to monitor weights, swelling, shortness of breath closely. If gains 3 to 4 pounds in a few days he needs to call, please have him update us in 7 to 10 days if doing ok  mdg ps lets start magnesium oxide 400mg daily and liz bmp, mag in a week or so for magnesium of 1.7  mdg  ------    Notes recorded by Gelacio Martinez MD on 9/16/2019 at 8:54 PM CDT  Magnesium mildly low, other labs look good, Await bnp, lets start magnesium oxide 400mg daily and recheck potassium, magnesium in a week  ty mdg

## 2021-06-01 NOTE — PATIENT INSTRUCTIONS - HE
Please call my nurse Kelly with any heart related questions.Call with increased shortness of breath, weight gain more than 3 to 5 pounds in a few day period of time, dizziness or chest discomfort.Her number is [phone]    The new prescription is for 40mg tablets of Furosemide so take 1 tablet twice a day.

## 2021-06-02 NOTE — TELEPHONE ENCOUNTER
Follow up call to pt - current weight is 206#, and no shortness of breath.  Pt reports he is back to his normal furosemide 40 mg in the morning and 20 mg in the evening.  -kenisha

## 2021-06-02 NOTE — TELEPHONE ENCOUNTER
"Pt called to report weight gain.  At the beginning of the week he was 206#, and now is 209#.  Pt denies shortness of breath.  Pt does endorse a \"slight\" puffiness around ankles that is not any worse than usual.    Pt currently taking furosemide 40 mg every morning, and 20 mg every evening.    Lauren - in Dr Martinez's absence do you have any recommendations?  Pt states he is being pro-active because we're headed in to the weekend and he doesn't want any trouble.  -rah  "

## 2021-06-02 NOTE — TELEPHONE ENCOUNTER
Recommendations called to pt.  Pt verbalized understanding and will take 40 mg furosemide two times a day for 3 days starting today.  Plan made to f/u with pt on Monday.  Pt had no other questions at this time.  sofia

## 2021-06-02 NOTE — TELEPHONE ENCOUNTER
Spoke with [employer] [phone] and pt [employer] does not require a PA. Will send current script to pharm and call pt.

## 2021-06-02 NOTE — PATIENT INSTRUCTIONS - HE
Ashok Garcia,    It was a pleasure to see you today at [facility].     My recommendations after this visit include:  - Please follow up with Dr Martinez December 5   - Please follow up with Lauren Adame in the spring when you get back from [place]  - No changes to your medications    Lauren Adame, CNP

## 2021-06-02 NOTE — TELEPHONE ENCOUNTER
Please have him increase lasix to 40 mg twice a day through the weekend for 3 days then continue 40 mg in the morning and 20 mg in the afternoon after the weekend on Monday. Thanks

## 2021-06-03 VITALS
SYSTOLIC BLOOD PRESSURE: 112 MMHG | RESPIRATION RATE: 16 BRPM | DIASTOLIC BLOOD PRESSURE: 56 MMHG | WEIGHT: 215 LBS | BODY MASS INDEX: 28.49 KG/M2 | HEART RATE: 76 BPM

## 2021-06-03 VITALS
BODY MASS INDEX: 28.63 KG/M2 | DIASTOLIC BLOOD PRESSURE: 58 MMHG | HEART RATE: 68 BPM | RESPIRATION RATE: 16 BRPM | HEIGHT: 73 IN | SYSTOLIC BLOOD PRESSURE: 110 MMHG | WEIGHT: 216 LBS

## 2021-06-03 VITALS — WEIGHT: 255 LBS | BODY MASS INDEX: 33.64 KG/M2

## 2021-06-03 VITALS — WEIGHT: 255 LBS | HEIGHT: 73 IN | BODY MASS INDEX: 33.8 KG/M2

## 2021-06-04 VITALS
HEIGHT: 72 IN | WEIGHT: 214 LBS | HEART RATE: 68 BPM | RESPIRATION RATE: 20 BRPM | SYSTOLIC BLOOD PRESSURE: 110 MMHG | BODY MASS INDEX: 28.99 KG/M2 | DIASTOLIC BLOOD PRESSURE: 68 MMHG

## 2021-06-04 VITALS — BODY MASS INDEX: 29.16 KG/M2 | WEIGHT: 215 LBS

## 2021-06-04 VITALS — WEIGHT: 217 LBS | BODY MASS INDEX: 29.43 KG/M2

## 2021-06-04 NOTE — PATIENT INSTRUCTIONS - HE
Please call my nurse Kelly with any heart related questions. Please call with increased swelling, shortness of breath or chest discomfort.Call if your weight goes up by more than 3 to 5 pounds in a few day period of time.Continue to curtail salt and alcohol.We talked about the Watchman device and I gave ou a pamphlet and if you have questions please call. [phone]

## 2021-06-05 NOTE — TELEPHONE ENCOUNTER
Got letter of Approval for Pradaxa. Called pt and he will stay on Pradaxa and call if there are pricing issues. Faxed letter to pharm in [place] and [place]. Scan to media in chart.

## 2021-06-05 NOTE — TELEPHONE ENCOUNTER
Candel[facility] denied. Pt informed and will contact Dr. Arias to change pt to either Eliquis or Xarelto. Sent 30 day free cared to pharm in [place] so pt has some extra meds to get by until new script is sent.

## 2021-06-05 NOTE — TELEPHONE ENCOUNTER
I am okay changing from Pradaxa to Eliquis.  The dose would be 5 mg p.o. twice daily.  He is over age 78 but greater than 60 kg and normal creatinine.  I would like help from the Pharm.D. about how to go about changing from Pradaxa to Eliquis with respect to when to start the Eliquis and when to stop Pradaxa.ty mdg

## 2021-06-05 NOTE — TELEPHONE ENCOUNTER
PA for Pradaxa started.   CINTHYA SOL Key: [id number] - Rx #: [id number] Need help? Call us at [phone]   Status   Additional Information Required   DrugPradaxa 150MG capsules   [facility]   Original Claim Info70

## 2021-06-05 NOTE — TELEPHONE ENCOUNTER
CINTHYA SOL Key: [id number] - Rx #: [id number] Need help? Call us at [phone]   Outcome   Approvedtoday   Effective from 01/20/2020 through 01/20/2021.   DrugPradaxa 150MG capsules   [employer] Prescription Drug Authorization Form   Original Claim Info70

## 2021-06-07 NOTE — PROGRESS NOTES
Review of Systems - General ROS: positive for  - weight gain and weight loss  ALL OTHERS WNL    NO OTHER CONCERNS    OPEN TO HAVE VIDEO VISIST    Shannon Mann, CMA

## 2021-06-07 NOTE — PATIENT INSTRUCTIONS - HE
Ashok Garcia,    It was a pleasure to see you today at [facility].     My recommendations after this visit include:  -Please follow-up with Dr. Martinez in June  -Please follow-up with Lauren Adame in September  -Please increase Lasix to 40 mg twice a day for 1 week.  My nurse Alison will call you next week to see how you are doing    Lauren Adame, CNP

## 2021-06-07 NOTE — TELEPHONE ENCOUNTER
----- Message from Lauren Adame CNP sent at 5/6/2020  7:54 AM CDT -----  Regarding: update next week  Alison,    Will you please call this patient next week Tuesday and see how he is doing after increasing his lasix for a week? His weight was up and having mild swelling when I spoke with him yesterday. Thank you    Lauren

## 2021-06-08 ENCOUNTER — COMMUNICATION - HEALTHEAST (OUTPATIENT)
Dept: CARDIOLOGY | Facility: CLINIC | Age: 81
End: 2021-06-08

## 2021-06-08 DIAGNOSIS — I50.32 CHF (CONGESTIVE HEART FAILURE), NYHA CLASS II, CHRONIC, DIASTOLIC (H): ICD-10-CM

## 2021-06-08 RX ORDER — MAGNESIUM OXIDE 400 MG/1
1 TABLET ORAL DAILY
Qty: 90 TABLET | Refills: 1 | Status: SHIPPED | OUTPATIENT
Start: 2021-06-08 | End: 2023-05-18

## 2021-06-08 NOTE — TELEPHONE ENCOUNTER
Ashok was contacted with recommendations to remain on the Lasix 40mg two times a day, for now, and that the slow response was favorable as it was less damaging to the kidneys. He will continue this dosage, was requesting his f/u with Dr. Martinez be made as an in person clinic visit instead of phone or video. Message to Scheduling to arrange. sk/RN

## 2021-06-08 NOTE — TELEPHONE ENCOUNTER
Spoke with pt about BNP, and other labs. Will follow up with PCP and with Lauren in July.   Notes recorded by Gelacio Martinez MD on 6/10/2020 at 9:19 PM CDT   Ps bnp looks good, weight gain would appear to be more calorie related, f/u 1 month with kyra scott   ------     Notes recorded by Gelacio Martinez MD on 6/10/2020 at 9:16 PM CDT   Labs ok except mild  Anemia, please ask him to see his primary re mild anemia  mdg

## 2021-06-08 NOTE — TELEPHONE ENCOUNTER
Please have him continue the lasix 40 mg twice a day. Please tell him that it is ok that we are taking the fluid off slowly so it does not effect his kidneys. Thank you. He needs a follow up with Dr Martinez in June and has not scheduled yet. Please have him schedule this.

## 2021-06-08 NOTE — TELEPHONE ENCOUNTER
Contacted Ashok today to discuss his response to the increase in Lasix dose since last week.     He stated that he had been on 40mg in AM , 20mg in PM for quite a long time. ( whole pill in morning, half in afternoon).  Last week, following the 5/5/20 visit, he increased to Lasix 40mg two times a day.  So, he reports his wt is down to 213# which is down from 216# on 5/5.   He wears elastic support stockings daily. He notes that the swelling is not necessarily improved in his ankles, the legs are some better. He does not feel that he retains fluid in his abdomen at all.     While his wt is down, 3#, it is not the response he was hoping for.     Lauren, what are your recommendations at this time? sk/DAVI

## 2021-06-08 NOTE — PATIENT INSTRUCTIONS - HE
Nice to visit with you today.We are going to plan  blood work and will back in touch with you.Please call my nurse Kelly with any heart related question.Please make an appointment to see your primary physician regarding diarrhea Her number is [phone]

## 2021-06-08 NOTE — TELEPHONE ENCOUNTER
----- Message from Gelacio Martinez MD sent at 6/10/2020  9:16 PM CDT -----  Labs ok except mild  Anemia, please ask him to see his primary re mild anemia  mdg

## 2021-06-09 NOTE — PATIENT INSTRUCTIONS - HE
Ashok Garcia,    It was a pleasure to see you today at [facility].     My recommendations after this visit include:  - Please follow up with Dr Martinez in September  - Please follow up with Lauren Adame in December  - Continue current medications    Lauren Adame, CNP

## 2021-06-09 NOTE — PROGRESS NOTES
Vitals - Patient Reported  Weight (Patient Reported): 212 lb (96.2 kg)    Review of Systems - History obtained from the patient  General ROS: negative  Psychological ROS: negative  Ophthalmic ROS: negative  ENT ROS: negative  Hematological and Lymphatic ROS: positive for - easy bruising  Respiratory ROS: negative   Cardiovascular ROS: negative  Gastrointestinal ROS: positive for - constipation and diarrhea  Genito-Urinary ROS: positive for - frequent urination at night (due to meds)  Musculoskeletal ROS: negative  Neurological ROS: positive for - daytime sleepiness  Dermatological ROS: negative

## 2021-06-10 NOTE — PROGRESS NOTES
[facility] Progress Note    Assessment:  1.  Coronary artery disease with prior bypass surgery in 2008 outside hospital with normalization of left ventricular systolic function.  Recent echocardiogram completed in October 2016 demonstrated reduced systolic function was suggested that we pursue nuclear stress testing to further evaluate ischemic burden and further evaluate ejection fraction.  He declined as he was traveling to [place] and did not have any major issues over the winter months.  Does describe some diminished exercise tolerance and some occasional chest tightness and is now willing to pursue nuclear stress test which I described in detail and will schedule in the near future.    2.  Persistent/chronic atrial fibrillation.  He is on Pradaxa.  He notes some mild nonspecific symptoms that he wonders is related to the Pradaxa but at this time wishes to continue on Pradaxa.  His heart rate is slow and I am going to decrease the carvedilol to 6.25 mg twice daily and await nuclear stress test.  He is going to follow-up in the atrial fibrillation clinic as well    3.  Hyperlipidemia.  LDL cholesterol in October 2016 59 with normal liver function tests.  He is going to continue with the current dosing of simvastatin        Plan: Outlined above with plan follow-up in atrial fibrillation clinic and myself in 3 months    1. CAD (coronary artery disease)  ECG Clinic - Today    NM Pharmacologic Stress Test    carvedilol (COREG) 6.25 MG tablet         An After Visit Summary was printed and given to the patient.    Subjective:    Ahsok Garcia is a 76 y.o. male who returned for a planned  follow up visit.  He wintered in [place] after being seen October 2016.  At that time we determined that he was in persistent atrial fibrillation without obvious symptoms of palpitation.  He tells me that in the winter months he did reasonably well but does note some breathlessness with higher levels of exertion.   He gives the example of walking through the airport with luggage will result in some breathlessness.  He also reports some occasional tightness in his chest primarily when he feels more anxious and has taken an occasional nitroglycerin.  This symptom has not been progressive.  It was recommended to him that we pursue a nuclear stress testing given the decline in left ventricular systolic function that was observed on the most recent echocardiogram but he was traveling to [place] and declined to pursue this testing at that time but is now interested in reconsidering this option.  In August 2013 ejection fraction was 60%, he underwent bypass surgery in 2008 with a preoperative ejection fraction of 30-35%.    Review of Systems:   General: Weight Gain  Eyes: WNL  Ears/Nose/Throat: WNL  Lungs: WNL  Heart: Shortness of Breath with activity  Stomach: Constipation  Bladder: WNL  Muscle/Joints: WNL  Skin: WNL  Nervous System: Loss of Balance  Mental Health: WNL     Blood: Easy Bruising      Problem List:    Patient Active Problem List   Diagnosis     Hyperlipidemia LDL goal <70     Coronary Artery Disease     Obesity     Hypertension     Right bundle branch block     Esophageal Reflux     Persistent atrial fibrillation     Cardiomyopathy       Social History     Social History     Marital status:      Spouse name: N/A     Number of children: N/A     Years of education: N/A     Occupational History     Not on file.     Social History Main Topics     Smoking status: Never Smoker     Smokeless tobacco: Never Used     Alcohol use Not on file     Drug use: No     Sexual activity: Not on file     Other Topics Concern     Not on file     Social History Narrative       No family history on file.    Current Outpatient Prescriptions   Medication Sig Dispense Refill     cyanocobalamin (VITAMIN B-12) 1000 MCG tablet Take 1,000 mcg by mouth daily.       folic acid (FOLVITE) 400 MCG tablet Take 400 mcg by mouth daily.        "losartan (COZAAR) 50 MG tablet Take 1 tablet (50 mg total) by mouth daily. 90 tablet 3     nitroglycerin (NITROSTAT) 0.4 MG SL tablet PLACE 1 TABLET UNDER THE TONGUE EVERY 5 MINUTES AS NEEDED FOR CHEST PAIN. 25 tablet 6     omeprazole (PRILOSEC) 20 MG capsule Take 20 mg by mouth daily.       PRADAXA 150 mg capsu TAKE 1 CAPSULE BY MOUTH TWICE DAILY 180 capsule 1     simvastatin (ZOCOR) 40 MG tablet TAKE 1 TABLET BY MOUTH DAILY 90 tablet 2     aspirin 81 MG EC tablet Take 162 mg by mouth daily.       carvedilol (COREG) 6.25 MG tablet Take 1 tablet (6.25 mg total) by mouth 2 (two) times a day with meals. 180 tablet 5     No current facility-administered medications for this visit.        Objective:     /72 (Patient Site: Left Arm, Patient Position: Sitting, Cuff Size: Adult Large)  Pulse 66  Resp 18  Ht 6' 1\" (1.854 m)  Wt (!) 237 lb (107.5 kg)  BMI 31.27 kg/m2  (!) 237 lb (107.5 kg)     Physical Exam:    GENERAL APPEARANCE: alert, no apparent distress  HEENT: no scleral icterus or xanthelasma  NECK: jugular venous pressure within normal limits  CHEST: symmetric, the lungs are clear to auscultation  CARDIOVASCULAR: Irregular, bradycardic, soft systolic murmur click, or gallop; no carotid bruits  Abdomen: No Organomegaly, masses, bruits, or tenderness. Bowels sounds are present      EXTREMITIES: no cyanosis, clubbing or edema    Cardiac Testing:  Procedure Date  Date: 10/19/2016 Start: 12:57 PM End: 01:24 AM     Study Location: [facility]  Technical Quality: Limited visualization     Patient Status: Routine     Height: 73 inches Weight: 232.01 pounds BSA: 2.29 m^2 BMI: 30.61 kg/m^2     HR: 58 bpm BP: 110/78 mmHg     Indications  Coronary artery disease.     Conclusions      Summary  Global hypokinesis of the left ventricle with moderate impairment of  systolic function.  Left ventricular ejection fraction is visually estimated to be 35%.  Mild mitral regurgitation.  Since 8/19/2013 the EF has " decreased    CONCLUSION: Persistent atrial fibrillation with some tendency towards slow  ventricular response at night. Clinical correlation advised.        ZULMA pierce  D 10/20/2016 15:58:06  T 10/20/2016 16:43:05  R 10/20/2016 16:43:05    Atrial fibrillation with slow ventricular response at 48 bpm, right bundle branch block, ST-T segment changes impaired to October 2016 heart rate is slower and the ST segment changes are somewhat more noted  Lab Results:    Lab Results   Component Value Date     10/25/2016    K 4.5 10/25/2016     10/25/2016    CO2 23 10/25/2016    BUN 23 10/25/2016    CREATININE 1.07 10/25/2016    CALCIUM 9.4 10/25/2016     Lab Results   Component Value Date    CHOL 129 10/25/2016    TRIG 108 10/25/2016    HDL 48 10/25/2016     BNP (pg/mL)   Date Value   11/07/2011 1929 (H)   11/05/2011 158 (H)     Creatinine (mg/dL)   Date Value   10/25/2016 1.07   10/10/2016 1.01   09/22/2015 0.98   09/16/2014 0.87     LDL Calculated (mg/dL)   Date Value   10/25/2016 59   09/22/2015 46   09/16/2014 55     Lab Results   Component Value Date    WBC 7.1 10/25/2016    HGB 14.2 10/25/2016    HCT 41.8 10/25/2016    MCV 97 10/25/2016     10/25/2016               This note has been dictated using voice recognition software. Any grammatical or context distortions are unintentional and inherent to the software.      Gelacio Martinez M.D., F.A.C.C.  [facility]  [phone]

## 2021-06-11 NOTE — PROGRESS NOTES
[facility]    Assessment/Plan:      Problem List Items Addressed This Visit     Coronary Artery Disease    Hypertension    Persistent atrial fibrillation - Primary    Cardiomyopathy        1.  Permanent atrial fibrillation: Asymptomatic.  Ventricular response appears well controlled, particularly since the decrease in his carvedilol dose.  He has a [id number] score of 5 for age >75, CAD, HTN, and cardiomyopathy; continue Pradaxa 150 mg twice a day for stroke prophylaxis.  BMP done today, results pending.   He is having some bruising, but no significant bleeding.  We discussed alternatives to Pradaxa, including Eliquis, Xarelto, and left atrial appendage occlusion with the watchman device.  He is going to look into his insurance coverage for Eliquis as it has a lower risk for bleed and does Pradaxa.  He is not interested in a procedural option at this point, but will consider it if he has any significant issues with OAC.    2.  Cardiomyopathy, resolved: Recent imaging shows normalization of left ventricular systolic performance.  No signs of acute fluid overload on exam today.      3.  Coronary artery disease: No symptoms of angina or significant shortness of breath.  NM stress test showed no evidence for fixed or reversible defect.    4.  Hypertension: Blood pressure at target today.      Follow-up in 1 year.    Subjective:      Ashok Garcia, a very pleasant 76-year-old gentleman, is here today for follow-up of atrial fibrillation.  He reports a history of paroxysmal atrial fibrillation, that transitioned to persistent atrial fibrillation in approximately October 2016.  He is asymptomatic and ventricular response is controlled with a tendency for mild asymptomatic bradycardia.   Echocardiogram in 2016 showed decreased left ventricular systolic function with an EF of 35%; previous echo in August 2013 showed an EF of 60%.  It was a technically difficult study and build declined use of contrast.  He  delayed further workup until he returned from [place].  He has a history of coronary artery disease with history of CABG in [facility] in 2008 with a preoperative LVEF of 30-35% and postoperative EF of 60%, hypertension, hyperlipidemia, and GERD.  Recent nuclear stress test showed no evidence for myocardial ischemia or infarction and an LVEF of 53%.    Bill states that he continues to feel very well.  He denies chest discomfort, palpitations, abdominal fullness/bloating or peripheral edema, shortness of breath, paroxysmal nocturnal dyspnea, orthopnea, lightheadedness, dizziness, pre-syncope, or syncope.  He continues to have some shortness of breath with more strenuous activity such as [place] suit[place] through an airport, but this is long-standing.  He does not have any dyspnea on exertion when using his [employer]ody[employer] bicycle.      Medical, surgical, family, social history, and medications were reviewed and updated as necessary.    Patient Active Problem List   Diagnosis     Hyperlipidemia LDL goal <70     Coronary Artery Disease     Obesity     Hypertension     Right bundle branch block     Esophageal Reflux     Persistent atrial fibrillation     Cardiomyopathy       Past Medical History:   Diagnosis Date     CAD (coronary artery disease)      Esophageal reflux      Hyperlipidemia      Hypertension        Past Surgical History:   Procedure Laterality Date     [place] CABG, VEIN, SINGLE      Description: CABG (CABG);  Proc Date: 01/01/2008;  Comments: LIMA to LAD, SVG to diagonal and OM, radial artery to OM       Allergies   Allergen Reactions     Nuts - Unspecified      No Known Drug Allergies        Current Outpatient Prescriptions   Medication Sig Dispense Refill     aspirin 81 MG EC tablet Take 162 mg by mouth daily.       carvedilol (COREG) 6.25 MG tablet Take 1 tablet (6.25 mg total) by mouth 2 (two) times a day with meals. 180 tablet 5     cyanocobalamin (VITAMIN B-12) 1000 MCG tablet Take 1,000 mcg by mouth  "daily.       folic acid (FOLVITE) 400 MCG tablet Take 400 mcg by mouth daily.       losartan (COZAAR) 50 MG tablet Take 1 tablet (50 mg total) by mouth daily. 90 tablet 3     nitroglycerin (NITROSTAT) 0.4 MG SL tablet PLACE 1 TABLET UNDER THE TONGUE EVERY 5 MINUTES AS NEEDED FOR CHEST PAIN. 25 tablet 6     omeprazole (PRILOSEC) 20 MG capsule Take 20 mg by mouth daily.       PRADAXA 150 mg capsu TAKE 1 CAPSULE BY MOUTH TWICE DAILY 180 capsule 1     simvastatin (ZOCOR) 40 MG tablet TAKE 1 TABLET BY MOUTH DAILY 90 tablet 2     No current facility-administered medications for this visit.        History reviewed. No pertinent family history.    Social History   Substance Use Topics     Smoking status: Never Smoker     Smokeless tobacco: Never Used     Alcohol use None       Review of Systems:   General: WNL  Eyes: WNL  Ears/Nose/Throat: WNL  Lungs: WNL  Heart: WNL  Stomach: WNL  Bladder: WNL  Muscle/Joints: WNL  Skin: WNL  Nervous System: WNL  Mental Health: WNL     Blood: WNL      Objective:      /72 (Patient Site: Right Arm, Patient Position: Sitting, Cuff Size: Adult Regular)  Pulse 60  Resp 18  Ht 6' 1\" (1.854 m)  Wt (!) 238 lb (108 kg)  BMI 31.4 kg/m2    Physical Exam  General Appearance:  Alert, well-appearing, in no acute distress.     HEENT:  Atraumatic, normocephalic; no scleral icterus, EOM intact; the mucous membranes were pink and moist.   Chest: Chest symmetric, spine straight.     Lungs:   Respirations unlabored; the lungs are clear to auscultation.   Cardiovascular:   Auscultation reveals normal first and second heart sounds with no murmurs, rubs, or gallops.  Irregularly irregular rate and rhythm. No JVD.  Radial and posterior tibial pulses are intact.    Abdomen:  Soft, nontender, nondistended, bowel sounds present.     Extremities: No cyanosis, clubbing, or edema.   Musculoskeletal:  Moves all extremities.     Skin: No xanthelasma.   Neurologic: Mood and affect are appropriate. Oriented to " person, place, time, and situation.       Cardiographics  ECG done 5/12/2017 was personally reviewed, shows atrial fibrillation with slow ventricular response at 48 bpm and a right bundle branch block  ECG: 10/10/16 was personally reviewed, shows atrial fibrillation with controlled ventricular response at 63 bpm and a right bundle branch block.    24 hour Holter monitor worn 10/19/16 shows persistent atrial fibrillation with some tendency towards slow ventricular response at night.  Average ventricular response was 54 bpm with a range from , generally ranging from 55-80 bpm.  He had 2 long R-R intervals of 3.1 and 3.5 seconds at night, but none during the daytime.    NM pharmacologic stress test done 5/22/2017    The pharmacologic nuclear stress test is negative for inducible myocardial ischemia or infarction.    The left ventricular ejection fraction is 53% with abnormal septal motion consistent with postoperative state.    There is no prior study available.    Echocardiogram: Done 10/19/16  Global hypokinesis of the left ventricle with moderate impairment of  systolic function.  Left ventricular ejection fraction is visually estimated to be 35%.  Mild mitral regurgitation.  Since 8/19/2013 the EF has decreased      Lab Review   BMP drawn today, results pending              Nell Bhandari, [facility], Electrophysiology  [phone]    This note has been dictated using voice recognition software. Any grammatical, typographical, or context distortions are unintentional and inherent to the software.     Yes

## 2021-06-13 NOTE — PROGRESS NOTES
[facility] Progress Note    Assessment:  1.  Coronary artery disease without complaints of angina or significant shortness of breath.  Recent nuclear stress test was treated no ischemia with ejection fraction that was lower limits of normal.  He will continue with aggressive risk factor modification.    2.  Permanent atrial fibrillation.  No obvious symptoms.  Chads 2 vascular of 5.  He remains on Pradaxa for stroke prophylaxis.  Chemistry profile was reviewed.  He does have some bradycardic tendency and we are going to plan a Holter monitor for follow-up and will comment once this is available for review.  He did talk to me about the higher co-pay for Pradaxa.  Previously he determined that his insurance would not cover Eliquis.  He was going to contact his insurance carrier regarding Xarelto.    3.  He mentions some mild difficulty with swallowing periodically and indicates to me that he will be following up with his primary care physician in the near future.  He also mentioned that he had a cyst removed from his neck and possibly that his hemoglobin was mildly low at 12.8.  I do not have this laboratory study and encouraged him to follow-up with his primary care physician in this regard.    4.  Hyperlipidemia with lipid results from August 2017 finding an LDL cholesterol of 56 with normal liver function tests.        Plan: Holter monitor.  Follow-up in 6 months.            Follow up with Dr Simms    1. CAD (coronary artery disease)  ECG Clinic - Today   2. Atherosclerosis of native coronary artery of native heart, angina presence unspecified     3. Persistent atrial fibrillation     4. Hyperlipidemia LDL goal <70           An After Visit Summary was printed and given to the patient.    Subjective:    Ashok Garcia is a 76 y.o. male who returned for a planned  follow up visit.  He reports that overall he has been feeling well.  He has not had complaints of chest pain and states symptoms of  shortness of breath are stable.  He continues to be active by going to the store in the [employer] and does not have any particular difficulty with these activities.  He has persistent atrial fibrillation was somewhat slower ventricular response.  He had an echocardiogram that demonstrated an ejection fraction that was reportedly 35% at that time with a prior echo of 60% since his bypass surgery.  Preoperatively in 2080 ejection fraction that was reported to be 30-35%.  He underwent a nuclear stress test a few months ago that reported no ischemia or infarction with ejection fraction of 53%.  He has some tendency towards bradycardia in the atrial fibrillation.    He notes that he ate something that did not sit quite well with him on Saturday and has a little bit of bloating sensation in his abdomen.  He is not describing chest pain.  He is not describing orthopnea.  He is not describing dizziness or lightheadedness.  Review of Systems:   General: Weight Gain  Eyes: WNL  Ears/Nose/Throat: WNL  Lungs: WNL  Heart: Shortness of Breath with activity  Stomach: WNL  Bladder: WNL  Muscle/Joints: WNL  Skin: WNL  Nervous System: Daytime Sleepiness  Mental Health: WNL     Blood: Easy Bruising      Problem List:    Patient Active Problem List   Diagnosis     Hyperlipidemia LDL goal <70     Coronary Artery Disease     Obesity     Hypertension     Right bundle branch block     Esophageal Reflux     Persistent atrial fibrillation     Cardiomyopathy       Social History     Social History     Marital status:      Spouse name: N/A     Number of children: N/A     Years of education: N/A     Occupational History     Not on file.     Social History Main Topics     Smoking status: Never Smoker     Smokeless tobacco: Never Used     Alcohol use Not on file     Drug use: No     Sexual activity: Not on file     Other Topics Concern     Not on file     Social History Narrative       No family history on file.    Current Outpatient  "Prescriptions   Medication Sig Dispense Refill     carvedilol (COREG) 6.25 MG tablet Take 1 tablet (6.25 mg total) by mouth 2 (two) times a day with meals. 180 tablet 5     cyanocobalamin (VITAMIN B-12) 1000 MCG tablet Take 1,000 mcg by mouth daily.       folic acid (FOLVITE) 400 MCG tablet Take 400 mcg by mouth daily.       losartan (COZAAR) 50 MG tablet Take 1 tablet (50 mg total) by mouth daily. 90 tablet 3     nitroglycerin (NITROSTAT) 0.4 MG SL tablet PLACE 1 TABLET UNDER THE TONGUE EVERY 5 MINUTES AS NEEDED FOR CHEST PAIN. 25 tablet 6     omeprazole (PRILOSEC) 20 MG capsule Take 20 mg by mouth daily.       PRADAXA 150 mg capsu TAKE 1 CAPSULE BY MOUTH TWICE DAILY 180 capsule 1     simvastatin (ZOCOR) 40 MG tablet TAKE 1 TABLET BY MOUTH DAILY 90 tablet 1     aspirin 81 MG EC tablet Take 162 mg by mouth daily.       No current facility-administered medications for this visit.        Objective:     /74 (Patient Site: Left Arm, Patient Position: Sitting, Cuff Size: Adult Large)  Pulse (!) 44  Resp 16  Ht 6' 1\" (1.854 m)  Wt (!) 248 lb (112.5 kg)  BMI 32.72 kg/m2  (!) 248 lb (112.5 kg)   Heart rate during my examination 60s and irregular.    Physical Exam:    GENERAL APPEARANCE: alert, no apparent distress  HEENT: no scleral icterus or xanthelasma  NECK: jugular venous pressure challenging to assess but does not appear obviously increased.  CHEST: symmetric, the lungs are clear to auscultation  CARDIOVASCULAR: Irregular, soft systolic murmur; no carotid bruits  Abdomen: No Organomegaly, masses, bruits, or tenderness. Bowels sounds are present      EXTREMITIES: no cyanosis, clubbing or edema    Cardiac Testing:  CONCLUSION:  Persistent atrial fibrillation with some tendency towards slow  ventricular response at night.  Clinical correlation advised.        ZULMA Blake 10/20/2016 15:58:06  T 10/20/2016 16:43:05  R 10/20/2016 16:43:05  [id number]     Indications      CAD (coronary artery disease) "       Conclusion        The pharmacologic nuclear stress test is negative for inducible myocardial ischemia or infarction.    The left ventricular ejection fraction is 53% with abnormal septal motion consistent with postoperative state.    There is no prior study available.     ECG demonstrates atrial fibrillation with heart rate of 57 bpm, right bundle branch block, nonspecific ST-T changes.  Appears similar to prior EKG.  Lab Results:    Lab Results   Component Value Date     (L) 08/03/2017    K 4.7 08/03/2017     08/03/2017    CO2 23 08/03/2017    BUN 15 08/03/2017    CREATININE 1.15 08/03/2017    CALCIUM 9.2 08/03/2017     Lab Results   Component Value Date    CHOL 118 08/03/2017    TRIG 97 08/03/2017    HDL 43 08/03/2017     BNP (pg/mL)   Date Value   11/07/2011 1929 (H)   11/05/2011 158 (H)     Creatinine (mg/dL)   Date Value   08/03/2017 1.15   06/08/2017 1.02   10/25/2016 1.07   10/10/2016 1.01     LDL Calculated (mg/dL)   Date Value   08/03/2017 56   10/25/2016 59   09/22/2015 46     Lab Results   Component Value Date    WBC 7.1 10/25/2016    HGB 14.2 10/25/2016    HCT 41.8 10/25/2016    MCV 97 10/25/2016     10/25/2016               This note has been dictated using voice recognition software. Any grammatical or context distortions are unintentional and inherent to the software.      Gelacio Martinez M.D., F.A.C.C.  [facility]  [phone]

## 2021-06-14 NOTE — TELEPHONE ENCOUNTER
----- Message from Gelacio Martinez MD sent at 1/4/2021 11:44 AM CST -----  I ordered some blood work and I am hoping he can do this week as he is leaving for [place] this Sunday.  I ordered a comprehensive metabolic profile, magnesium and lipid panel.  Thank you mdg

## 2021-06-14 NOTE — PATIENT INSTRUCTIONS - HE
It was nice to visit with you today.  We talked about establishing care with a primary doctor.  I would suggest Dr. Bud Grant at [facility].  Please let me know if you are not able to get in to see him and we can make additional suggestions.  Please call with any increased symptoms of shortness of breath, fluid retention, dizziness or lightheadedness.  My nurse is Kelly and her number is [phone].

## 2021-06-15 NOTE — TELEPHONE ENCOUNTER
Okay thank you.  Ideally would like to see the blood work given the combination of medications that he is on.  Unsure if he looked into going to the local urgent care we could get blood work done. please remind him that he did come home from [place] with heart failure symptoms that would like to avoid this time around.  Thank you mdg

## 2021-06-15 NOTE — TELEPHONE ENCOUNTER
Discussed with pt.  Pt does not know where he could get labs done in [place] - he does not have any providers there.  Pt will be back in April or May - unsure yet and hasn't bought any tickets.  He also mentioned that his PCP has been calling him in [place] also as he is overdue to see them.  Pt will call when he decides when he is coming back to [place].  sofia

## 2021-06-16 PROBLEM — I50.32 CHRONIC DIASTOLIC CONGESTIVE HEART FAILURE (H): Status: ACTIVE | Noted: 2018-06-19

## 2021-06-18 NOTE — PROGRESS NOTES
Patient seen in clinic for HF education.  Reviewed HF Binder that includes the  HF Sx Awareness & Action plan  handout and  A Stronger Pump  booklet and Weight log booklet highlighting :  __X_patient s type of heart failure _X__Na management in diet  __X_importance of daily wts  _X__Fluid Guidelines, if applicable  __X_medication review and importance of compliance     Instructed patient in signs and sx of heart failure, reiterated when to call clinic - reviewed HF hotline # [phone] and after hours call # [phone].  Majority of time was spent reviewing: diet, and how to limit sodium intake.  Patient verbalized understanding of HF discussion.  Plan for f/u with continued HF education reviewed.  No formal f/u scheduled with nurse clinician for continued education - will continue to reinforce HF management education.

## 2021-06-26 NOTE — PROGRESS NOTES
Progress Notes by Tyler Gallagher NP at 6/19/2018  1:30 PM     Author: Tyler Gallagher NP Service: -- Author Type: Nurse Practitioner    Filed: 6/19/2018  4:33 PM Encounter Date: 6/19/2018 Status: Signed    : Tyler Gallagher NP (Nurse Practitioner)           Click to link to [facility] Heart Care     [facility] HEART CARE NOTE      Assessment/Recommendations   Assessment:    1. H/o cardiomyopathy with systolic dysfunction, NYHA class III with EF of 35 % in 2016: He was recently seen by Dr. Martinez on 6/7/2018 and thought he is going into diastolic heart failure given lower extremity edema.  His most stress test in June 2017 showed an EF of 53%. Patient reports  shortness of breath with exertion and fatigue at baseline.  He denies orthopnea or PND.  He was recently started on furosemide 20 mg daily and has noticed improvement in his leg swelling.  He does not weigh himself at home and does not necessarily follow a low-salt diet.  He occasionally exercise on a stationary bike.  Otherwise, he has been mostly sedentary.  He does not like to walk.    2. Chronic Atrial Fibrillation: Heart rate in 50s.  On carvedilol 6.25 mg twice a day.  Also on Pradaxa twice a day.  He reports fatigue but unsure if it is due to heart failure are low heart rate.  His Holter study done in 2017 did not show any significant bradycardia.    3. Hypertension: Blood pressure today is 90/66 recheck was 120/80 on left arm and 110/80 on right arm.  He is currently on carvedilol, losartan, and furosemide.    Plan:  We discussed about the heart failure, medication management, and lifestyle management including low-sodium diet and exercise.  He met with the nurse clinician for heart failure education.  Will discuss with Dr. Martinez if we need to repeat his echocardiogram.  1.   Heart failure medications:  - Beta blocker therapy with carvedilol 6.25 mg twice a day  - ARB therapy with losartan 50 mg daily  -  Last BMP was stable on 6/15/2018  - Mg+  level-pending  - Diuretic therapy with furosemide 20 mg daily.  Given his leg swelling has improved and stable BMP, will keep him on the same dose of furosemide for now.    -Recommended sodium <2000 mg per day, daily weight, and stay active as tolerated.  -Highly encouraged to call if experiencing worsening fatigue, shortness of breath, abdominal bloating, lightheaded or dizziness with rapid weight gain.    2.  Heart rate low in 50s.  If his repeat echocardiogram shows  normal EF, may consider to reduce the carvedilol dose to keep his heart rate little bit higher if this is causing him fatigue.    3.  Blood pressures stable on current regimen.  Recommended DASH diet    Follow up with Tyler in 8 weeks and Dr. Martinez in 6 months or sooner if needed.     History of Present Illness    Mr. Ashok Garcia is a 77 y.o. male with a significant past medical history of coronary artery disease with CABG ×4 in 1990s, permanent atrial fibrillation, hyperlipidemia, history of cardiomyopathy with systolic dysfunction, right bundle branch, hypertension, and obesity who is seen at [facility] heart failure clinic today for  follow-up per Dr. Martinez.  Patient saw Dr. Martinez on 6/7/2018 and was suspected to have underlying diastolic dysfunction.  His echocardiogram in 2000 showed an EF of 35%.  He underwent nuclear stress test in May 2017 showed an EF of 53% and negative for any inducible ischemia.  He also had a Holter study in October 2017 demonstrated persistent A. fib with low heart rate but no significant bradycardia noted.  He is recent BMP stable and BNP showed elevated up in 270s.  Dr. Martinez started him on furosemide 20 mg daily.    Today, patient reports that he noticed improvement in his lower extremity edema.  He reports shortness of breath on exertion and fatigue at baseline which is unchanged since been on diuretic. He denies fatigue, shortness of breath, orthopnea, PND, palpitations, chest pain and abdominal  fullness/bloating.  Occasionally he feels lightheaded with the sudden positional change.  His blood pressure today is stable.    He does not necessarily follow low-sodium diet and does not weigh himself at home.  He occasionally exercise on his stationary bike.  He is mostly sedentary and does not like to walk.    NM pharmacologic stress test on 5/23/2017  Conclusion     The pharmacologic nuclear stress test is negative for inducible myocardial ischemia or infarction.    The left ventricular ejection fraction is 53% with abnormal septal motion consistent with postoperative state.    There is no prior study available.       ECHO October 2016:   Conclusions     Global hypokinesis of the left ventricle with moderate impairment of   systolic function.   Left ventricular ejection fraction is visually estimated to be 35%.   Mild mitral regurgitation.   Since 8/19/2013 the EF has decreased       Physical Examination Review of Systems   Vitals:    06/19/18 1329   BP: 90/66   Pulse: (!) 56   Resp: 16     Body mass index is 31.2 kg/(m^2).  Wt Readings from Last 3 Encounters:   06/19/18 (!) 243 lb (110.2 kg)   06/07/18 (!) 245 lb (111.1 kg)   09/25/17 (!) 248 lb (112.5 kg)       General Appearance:     Alert, cooperative and in no acute distress.   ENT/Mouth: membranes moist, no oral lesions or bleeding gums.      EYES:  no scleral icterus, normal conjunctivae   Neck: no carotid bruits or thyromegaly   Chest/Lungs:   lungs are clear to auscultation, no rales or wheezing, respirations unlabored   Cardiovascular:    Irregularly irregular. Normal first and second heart sounds with no murmurs, rubs, or gallops; the carotid, radial and posterior tibial pulses are intact, Jugular venous pressure flat with no HJR, trace edema bilateral lower extremities    Abdomen:  Large but soft, nontender, nondistended, bowel sounds present   Extremities: no cyanosis or clubbing   Skin: warm, dry.    Neurologic: mood and affect are appropriate,  alert and oriented x3      General: WNL  Eyes: WNL  Ears/Nose/Throat: WNL  Lungs: WNL  Heart: WNL  Stomach: WNL  Bladder: WNL  Muscle/Joints: WNL  Skin: WNL  Nervous System: WNL  Mental Health: WNL     Blood: WNL     Medical History  Surgical History Family History Social History   Past Medical History:   Diagnosis Date   ? CAD (coronary artery disease)    ? Esophageal reflux    ? Hyperlipidemia    ? Hypertension     Past Surgical History:   Procedure Laterality Date   ? [place] CABG, VEIN, SINGLE      Description: CABG (CABG);  Proc Date: 01/01/2008;  Comments: LIMA to LAD, SVG to diagonal and OM, radial artery to OM    No family history on file. Social History     Social History   ? Marital status:      Spouse name: N/A   ? Number of children: N/A   ? Years of education: N/A     Occupational History   ? Not on file.     Social History Main Topics   ? Smoking status: Never Smoker   ? Smokeless tobacco: Never Used   ? Alcohol use Not on file   ? Drug use: No   ? Sexual activity: Not on file     Other Topics Concern   ? Not on file     Social History Narrative          Medications  Allergies   Current Outpatient Prescriptions   Medication Sig Dispense Refill   ? carvedilol (COREG) 6.25 MG tablet Take 1 tablet (6.25 mg total) by mouth 2 (two) times a day with meals. 180 tablet 5   ? cyanocobalamin (VITAMIN B-12) 1000 MCG tablet Take 1,000 mcg by mouth daily.     ? folic acid (FOLVITE) 400 MCG tablet Take 400 mcg by mouth daily.     ? furosemide (LASIX) 20 MG tablet Take 1 tablet (20 mg total) by mouth daily. 30 tablet 5   ? losartan (COZAAR) 50 MG tablet TAKE 1 TABLET(50 MG) BY MOUTH DAILY 90 tablet 2   ? multivitamin therapeutic tablet Take 1 tablet by mouth daily.     ? nitroglycerin (NITROSTAT) 0.4 MG SL tablet PLACE 1 TABLET UNDER THE TONGUE EVERY 5 MINUTES AS NEEDED FOR CHEST PAIN. 25 tablet 6   ? omeprazole (PRILOSEC) 20 MG capsule Take 20 mg by mouth daily.     ? PRADAXA 150 mg capsu TAKE 1 CAPSULE BY MOUTH  TWICE DAILY 180 capsule 1   ? simvastatin (ZOCOR) 40 MG tablet TAKE 1 TABLET BY MOUTH DAILY 90 tablet 2     No current facility-administered medications for this visit.       Allergies   Allergen Reactions   ? Nuts - Unspecified    ? No Known Drug Allergies          Lab Results    Chemistry CBC BNP   Lab Results   Component Value Date    CREATININE 1.05 06/15/2018    BUN 17 06/15/2018     (L) 06/15/2018    K 4.6 06/15/2018     06/15/2018    CO2 26 06/15/2018     Creatinine (mg/dL)   Date Value   06/15/2018 1.05   06/07/2018 0.93   08/03/2017 1.15   06/08/2017 1.02    Lab Results   Component Value Date    WBC 7.1 10/25/2016    HGB 14.2 10/25/2016    HCT 41.8 10/25/2016    MCV 97 10/25/2016     10/25/2016    Lab Results   Component Value Date     (H) 06/07/2018     BNP (pg/mL)   Date Value   06/07/2018 277 (H)   11/07/2011 1929 (H)   11/05/2011 158 (H)        40  minutes were spent with the patient with greater than 50% spent on education and counseling.    Tyler Gallagher [facility]   Heart Failure Clinic         This note has been dictated using voice recognition software. Any grammatical, typographical, or context distortions are unintentional and inherent to the software

## 2021-06-26 NOTE — PROGRESS NOTES
Progress Notes by Tyler Gallagher NP at 8/15/2018 12:50 PM     Author: Tyler Gallagher NP Service: -- Author Type: Nurse Practitioner    Filed: 8/15/2018  1:38 PM Encounter Date: 8/15/2018 Status: Signed    : Tyler Gallagher NP (Nurse Practitioner)           Click to link to [facility] Heart Care     [facility] HEART CARE NOTE      Assessment/Recommendations   Assessment:    1. Cardiomyopathy with systolic dysfunction, NYHA class II with EF of 40% in July, 2018: Well compensated.  He lost about 5 pounds since started on furosemide.  Shortness of breath and leg swelling has improved.  Fatigue is unchanged.     2.  Persistent atrial fibrillation: Heart rate in the low 50s.  Asymptomatic except for fatigue which is unchanged from baseline.  On Pradaxa for anticoagulant.    3.  Hypertension: 130/58.  On carvedilol, losartan, and furosemide.    Plan:  1.  We discussed about further titration of losartan for reduced ejection fraction.  Will obtain electrolytes today including magnesium level and will determine if losartan dose can be increased.  Reinforced low-sodium diet, daily weight, and stay physically active as tolerated.  Declined to watch heart failure video today.     Heart failure medications:  - Beta blocker therapy with carvedilol 6.25 mg twice a day  - ARB therapy with losartan 50 mg daily  -  Diuretic therapy with furosemide 20 mg daily    2.  Offered to decrease the carvedilol dose and see if his fatigue improves but patient declined stating that the fatigue has unchanged and is not interfering with his daily activities. However, patient was highly encouraged to call back if experiencing worsening fatigue with lightheadedness or dizziness  Or shortness of breath.    3.  Continue current hypertension regiment.  Follow DASH diet and regular exercise.    Follow up with Tyler in 4 weeks and Dr. Martinez in December as scheduled.     History of Present Illness     Mr. Ashok Garcia is a 77 y.o. male with a  significant past medical history of coronary artery disease with CABG ×4 in 1990s, permanent atrial fibrillation, hyperlipidemia, history of cardiomyopathy with systolic dysfunction, right bundle branch, hypertension, and obesity who is seen at [facility] heart failure clinic today for  follow-up. Patient saw Dr. Martinez in June 2018 was suspected to have underlying diastolic dysfunction.  His echocardiogram in 2000 showed an EF of 35%.  He underwent nuclear stress test in May 2017 showed an EF of 53% and negative for any inducible ischemia.  He also had a Holter study in October 2017 demonstrated persistent A. fib with low heart rate but no significant bradycardia noted.  He is recent BMP stable and BNP showed elevated up in 270s.  Dr. Martinez started him on furosemide 20 mg daily. Most recent ECHO showed EF of 40% with no significant change compared to previous ECHO.    No medication changes made during last heart failure clinic visit.  Today, patient reports that he continues to feel improvement in his shortness of breath and lower extremity edema.  However he does get fatigue which is unchanged from the baseline and also shortness of breath on exertion.  He denies lightheadedness, shortness of breath, orthopnea, PND, palpitations, chest pain, abdominal fullness/bloating and lower extremity edema.      He used to do exercise on stationary bike for 30 minutes every day but lately he has been doing it occasionally due to lack of interest.  He denies the heart failure symptoms interfering with his able to do exercise.He is monitoring home weights which are stable between 229-231pounds.  He is following a low sodium diet.      ECHO on 7/18/18 9 (reviewed)  Summary     Technically difficult study due to body habitus and presence of intrathoracic air.    Normal left ventricular size with moderately impaired systolic function. LVEF is estimated at 40%.    Normal right ventricular systolic function.    No  hemodynamically significant valvular abnormalities are identified.    When compared to the previous study dated 10/19/2016, no significant change.            Physical Examination Review of Systems   Vitals:    08/15/18 1256   BP: 130/58   Pulse: (!) 52   Resp: 12   SpO2: 97%     Body mass index is 31.27 kg/(m^2).  Wt Readings from Last 3 Encounters:   08/15/18 (!) 237 lb (107.5 kg)   07/18/18 (!) 243 lb (110.2 kg)   06/19/18 (!) 243 lb (110.2 kg)       General Appearance:     Alert, cooperative and in no acute distress.   ENT/Mouth: membranes moist, no oral lesions or bleeding gums.      EYES:  no scleral icterus, normal conjunctivae   Neck: no carotid bruits or thyromegaly   Chest/Lungs:   lungs are clear to auscultation, no rales or wheezing, respirations unlabored   Cardiovascular:    Heart rate regular but bradycardic in 50s. Normal first and second heart sounds with no murmurs, rubs, or gallops; the carotid, radial and posterior tibial pulses are intact, JVP is difficult to assess due to the patient's obesity and body habitus, no edema bilateral lower extremities    Abdomen:  Large but soft, nontender, nondistended, bowel sounds present   Extremities: no cyanosis or clubbing   Skin: warm, dry.    Neurologic: mood and affect are appropriate, alert and oriented x3                                                Medical History  Surgical History Family History Social History   Past Medical History:   Diagnosis Date   ? CAD (coronary artery disease)    ? Esophageal reflux    ? Hyperlipidemia    ? Hypertension     Past Surgical History:   Procedure Laterality Date   ? [place] CABG, VEIN, SINGLE      Description: CABG (CABG);  Proc Date: 01/01/2008;  Comments: LIMA to LAD, SVG to diagonal and OM, radial artery to OM    No family history on file. Social History     Social History   ? Marital status:      Spouse name: N/A   ? Number of children: N/A   ? Years of education: N/A     Occupational History   ? Not on file.      Social History Main Topics   ? Smoking status: Never Smoker   ? Smokeless tobacco: Never Used   ? Alcohol use Not on file   ? Drug use: No   ? Sexual activity: Not on file     Other Topics Concern   ? Not on file     Social History Narrative          Medications  Allergies   Current Outpatient Prescriptions   Medication Sig Dispense Refill   ? carvedilol (COREG) 6.25 MG tablet TAKE 1 TABLET(6.25 MG) BY MOUTH TWICE DAILY WITH MEALS 180 tablet 1   ? cyanocobalamin (VITAMIN B-12) 1000 MCG tablet Take 1,000 mcg by mouth daily.     ? folic acid (FOLVITE) 400 MCG tablet Take 400 mcg by mouth daily.     ? furosemide (LASIX) 20 MG tablet Take 1 tablet (20 mg total) by mouth daily. 30 tablet 5   ? losartan (COZAAR) 50 MG tablet Take 1 tablet (50 mg total) by mouth daily. 90 tablet 2   ? multivitamin therapeutic tablet Take 1 tablet by mouth daily.     ? nitroglycerin (NITROSTAT) 0.4 MG SL tablet PLACE 1 TABLET UNDER THE TONGUE EVERY 5 MINUTES AS NEEDED FOR CHEST PAIN. 25 tablet 6   ? omeprazole (PRILOSEC) 20 MG capsule Take 20 mg by mouth daily.     ? PRADAXA 150 mg capsu TAKE 1 CAPSULE BY MOUTH TWICE DAILY 180 capsule 1   ? simvastatin (ZOCOR) 40 MG tablet TAKE 1 TABLET BY MOUTH DAILY 90 tablet 2     No current facility-administered medications for this visit.       Allergies   Allergen Reactions   ? Nuts - Unspecified    ? No Known Drug Allergies          Lab Results    Chemistry CBC BNP   Lab Results   Component Value Date    CREATININE 1.05 06/15/2018    BUN 17 06/15/2018     (L) 06/15/2018    K 4.6 06/15/2018     06/15/2018    CO2 26 06/15/2018     Creatinine (mg/dL)   Date Value   06/15/2018 1.05   06/07/2018 0.93   08/03/2017 1.15   06/08/2017 1.02    Lab Results   Component Value Date    WBC 7.1 10/25/2016    HGB 14.2 10/25/2016    HCT 41.8 10/25/2016    MCV 97 10/25/2016     10/25/2016    Lab Results   Component Value Date     (H) 06/07/2018     BNP (pg/mL)   Date Value   06/07/2018 277  (H)   11/07/2011 1929 (H)   11/05/2011 158 (H)        Tyler Gallagher [facility]   Heart Failure Clinic         This note has been dictated using voice recognition software. Any grammatical, typographical, or context distortions are unintentional and inherent to the software

## 2021-06-26 NOTE — PROGRESS NOTES
Progress Notes by Gelacio Martinez MD at 6/7/2018  9:10 AM     Author: Gelacio Martinez MD Service: -- Author Type: Physician    Filed: 6/7/2018  9:45 AM Encounter Date: 6/7/2018 Status: Signed    : Gelacio Martinez MD (Physician)       [facility] Progress Note    Assessment:  1.  Coronary artery disease no complaints of anginal chest discomfort.  He had a nuclear stress test May 2017 that demonstrated no ischemia with ejection fraction that was lower limits of normal.  A prior echocardiogram which was stated to be technically challenging in October 2016 suggests reduced ejection fraction but the more recent nuclear stress test suggested left ventricular function to be mildly reduced to 53%.  He is noting some mild lower extremity edema.  I suspect some underlying diastolic dysfunction.  We talked about keeping his legs elevated and utilizing support stockings.  We talked about prudent dietary salt restriction.  We are going to trial low-dose furosemide 20 mg daily for the next 7-10 days.  Lab studies are ordered.  He will follow-up with the nurse practitioner in 10-14 days.    2.  Permanent atrial fibrillation.  No obvious symptoms.  Chads 2 vascular of 5.  He remains on Pradaxa.  He does tell me that he has some upcoming dental procedures and will update us with what the recommendations are from the dentist.  A Holter monitor October 2017 demonstrated persistent atrial fibrillation with slightly slow heart rate and some nocturnal bradycardia but no profound bradycardia    3.  Hyperlipidemia.  Most recent lipid studies are from August 2017 where his LDL cholesterol was 56 with normal liver function tests.        Plan: As outlined above with follow-up with nurse practitioner in 10-14 days.    1. CAD (coronary artery disease)  Basic metabolic panel    BNP(B-type Natriuretic Peptide)    Magnesium   2. Lower extremity edema  BNP(B-type Natriuretic Peptide)   3. CHF (congestive heart failure),  NYHA class II, chronic, diastolic  BNP(B-type Natriuretic Peptide)    furosemide (LASIX) 20 MG tablet    Basic metabolic panel         An After Visit Summary was printed and given to the patient.    Subjective:    Ashok Garcia is a 77 y.o. male who returned for a planned  follow up visit.  He reports no complaints of chest discomfort or significant shortness of breath.  He has noted some lower extremity edema that is mild and tingling of his feet.  He tries to follow a low-salt diet but does eat out at restaurants fairly regularly.  Has a history of coronary artery disease with bypass surgery a number of years ago in [place].  Ejection fraction preoperatively was reported 35% and improved post operatively.  He had a nuclear stress test 1 year ago that demonstrated no ischemia or infarction with an ejection fraction of 53%.  He does have atrial fibrillation.    He also notes some difficulty with staying asleep.  We talked about the potential trial of melatonin.    Review of Systems:   General: WNL  Eyes: WNL  Ears/Nose/Throat: WNL  Lungs: WNL  Heart: WNL  Stomach: WNL  Bladder: WNL  Muscle/Joints: WNL  Skin: WNL  Nervous System: WNL  Mental Health: WNL     Blood: WNL      Problem List:    Patient Active Problem List   Diagnosis   ? Hyperlipidemia LDL goal <70   ? Coronary Artery Disease   ? Obesity   ? Hypertension   ? Right bundle branch block   ? Esophageal Reflux   ? Persistent atrial fibrillation   ? Cardiomyopathy       Social History     Social History   ? Marital status:      Spouse name: N/A   ? Number of children: N/A   ? Years of education: N/A     Occupational History   ? Not on file.     Social History Main Topics   ? Smoking status: Never Smoker   ? Smokeless tobacco: Never Used   ? Alcohol use Not on file   ? Drug use: No   ? Sexual activity: Not on file     Other Topics Concern   ? Not on file     Social History Narrative       No family history on file.    Current Outpatient Prescriptions  "  Medication Sig Dispense Refill   ? carvedilol (COREG) 6.25 MG tablet Take 1 tablet (6.25 mg total) by mouth 2 (two) times a day with meals. 180 tablet 5   ? cyanocobalamin (VITAMIN B-12) 1000 MCG tablet Take 1,000 mcg by mouth daily.     ? folic acid (FOLVITE) 400 MCG tablet Take 400 mcg by mouth daily.     ? losartan (COZAAR) 50 MG tablet TAKE 1 TABLET(50 MG) BY MOUTH DAILY 90 tablet 2   ? multivitamin therapeutic tablet Take 1 tablet by mouth daily.     ? nitroglycerin (NITROSTAT) 0.4 MG SL tablet PLACE 1 TABLET UNDER THE TONGUE EVERY 5 MINUTES AS NEEDED FOR CHEST PAIN. 25 tablet 6   ? omeprazole (PRILOSEC) 20 MG capsule Take 20 mg by mouth daily.     ? PRADAXA 150 mg capsu TAKE 1 CAPSULE BY MOUTH TWICE DAILY 180 capsule 1   ? simvastatin (ZOCOR) 40 MG tablet TAKE 1 TABLET BY MOUTH DAILY 90 tablet 2   ? aspirin 81 MG EC tablet Take 162 mg by mouth daily.     ? furosemide (LASIX) 20 MG tablet Take 1 tablet (20 mg total) by mouth daily. 30 tablet 5   ? nitroglycerin (NITROSTAT) 0.4 MG SL tablet PLACE 1 TABLET UNDER THE TONGUE EVERY 5 MINUTES AS NEEDED FOR CHEST PAIN. 25 tablet 10     No current facility-administered medications for this visit.        Objective:     /80 (Patient Site: Left Arm, Patient Position: Sitting, Cuff Size: Adult Regular)  Pulse 61  Resp 10  Ht 6' 1\" (1.854 m)  Wt (!) 245 lb (111.1 kg)  BMI 32.32 kg/m2  (!) 245 lb (111.1 kg)   24 8 September 2017.    Physical Exam:    GENERAL APPEARANCE: alert, no apparent distress  HEENT: no scleral icterus or xanthelasma  NECK: jugular venous pressure not obviously elevated although mildly challenging exam  CHEST: symmetric, the lungs are clear to auscultation  CARDIOVASCULAR: Irregular rhythm, no significant murmur.; no carotid bruits  Abdomen: No Organomegaly, masses, bruits, or tenderness. Bowels sounds are present      EXTREMITIES: no cyanosis, clubbing.  1+ edema to the shins with some varicosities and mild erythema    Cardiac " Testing:  EKG Scan       Scan on: 5/22/17 9:45 AM by:       Indications      CAD (coronary artery disease)       Conclusion        The pharmacologic nuclear stress test is negative for inducible myocardial ischemia or infarction.    The left ventricular ejection fraction is 53% with abnormal septal motion consistent with postoperative state.    There is no prior study available.         IMPRESSION:  1.  A 24-hour Holter monitor was applied 10/02/2017 with date of interpretation  10/05/2017.  2.  Atrial fibrillation with a bundle branch block pattern is seen throughout the  recording.  3.  Average heart rate is 58 beats per minute.  Some nocturnal slowing is seen with  rates as low as 29 beats per minute and pauses up to 2.8 seconds.  No major  bradycardia is seen during waking hours with rates up to 103 beats per minute.  4.  Infrequent noncomplex ventricular ectopy with 426 PVCs.  There are no salvos, no  episodes of nonsustained ventricular tachycardia.  5.  Patient activity diary is completed, but no symptoms noted.     DISCUSSION:  Persistent atrial fibrillation with some slightly slow heart rate with  some nocturnal bradycardia.  No profound bradycardia, long pauses seen and no  important bradycardia is seen during waking hours.        SHABNAM GALLEGOS MD  pa  D 10/05/2017 18:11:06  T 10/05/2017 18:44:09  R 10/05/2017 18:44:09    Lab Results:    Lab Results   Component Value Date     (L) 08/03/2017    K 4.7 08/03/2017     08/03/2017    CO2 23 08/03/2017    BUN 15 08/03/2017    CREATININE 1.15 08/03/2017    CALCIUM 9.2 08/03/2017     Lab Results   Component Value Date    CHOL 118 08/03/2017    TRIG 97 08/03/2017    HDL 43 08/03/2017     BNP (pg/mL)   Date Value   11/07/2011 1929 (H)   11/05/2011 158 (H)     Creatinine (mg/dL)   Date Value   08/03/2017 1.15   06/08/2017 1.02   10/25/2016 1.07   10/10/2016 1.01     LDL Calculated (mg/dL)   Date Value   08/03/2017 56   10/25/2016 59   09/22/2015 46      Lab Results   Component Value Date    WBC 7.1 10/25/2016    HGB 14.2 10/25/2016    HCT 41.8 10/25/2016    MCV 97 10/25/2016     10/25/2016               This note has been dictated using voice recognition software. Any grammatical or context distortions are unintentional and inherent to the software.      Gelacio Martinez M.D., F.A.C.C.  [facility]  [phone]

## 2021-06-27 NOTE — PROGRESS NOTES
Progress Notes by Lauren Adame CNP at 8/15/2019  3:30 PM     Author: Lauren Adame CNP Service: -- Author Type: Nurse Practitioner    Filed: 8/15/2019  4:12 PM Encounter Date: 8/15/2019 Status: Signed    : Lauren Adame CNP (Nurse Practitioner)           Click to link to [facility] HEART CARE NOTE      Assessment/Recommendations   Assessment:    1.  Heart failure with preserved ejection fraction, NYHA class III: Compensated.  He continues to have fatigue and dyspnea on exertion.  He also has trace to 1+ lower extremity edema.  He does not weigh himself on a daily basis.  He also does not follow a low-sodium diet.  He had chicken and mac & cheese from a deli today.    2.  Persistent atrial fibrillation: Rate controlled.  He had a recent Holter monitor which showed average heart rate of 50 to 55 bpm.  Dr. Martinez instructed him to hold his carvedilol.  He continues Pradaxa 150 mg twice a day for anticoagulation.    3.  Hypertension: Slightly elevated.  Blood pressure 142/70    Plan:  1.  Continue current medications  2.  BMP and BNP pending  3.  Start daily weights and follow a low-sodium diet.  We discussed reading food labels.  4.  Encouraged regular exercise    Ashok Garcia will follow up with Dr. Martinez September 16 and in the heart failure clinic in 2 months.     History of Present Illness    Mr. Ashok Garcia is a 78 y.o. male seen at [facility] heart failure clinic today for continued follow-up.  His son accompanies him today.  He follows up for heart failure with preserved ejection fraction.  He had a nuclear stress test August 5, 2019 which showed an ejection fraction of 54% and was negative for inducible myocardial ischemia or infarction.  He also had a Holter monitor which showed persistent atrial fibrillation with bradycardia.  His average heart rate was 50 to 55 bpm.  Dr. Martinez recommended holding his carvedilol.  He has a past  medical history significant for hypertension, coronary artery disease, hyperlipidemia, and persistent atrial fibrillation.  Status post CABG.    During the last clinic visit, Dr. Martinez increased his Lasix to 40 mg daily.  He states that his lower extremity edema has improved slightly.  He continues to have fatigue, weakness, and dyspnea on exertion.  He denies orthopnea or PND.  He denies palpitations.  He denies lightheadedness, shortness of breath, orthopnea, PND, palpitations, chest pain and abdominal fullness/bloating.      He is not monitoring home weights.  He is not following a low sodium diet.  He lives alone.     Physical Examination Review of Systems   Vitals:    08/15/19 1526   BP: 142/70   Pulse: 64   Resp: 12     Body mass index is 33.64 kg/m .  Wt Readings from Last 3 Encounters:   08/15/19 (!) 255 lb (115.7 kg)   07/26/19 (!) 255 lb (115.7 kg)   08/15/18 (!) 237 lb (107.5 kg)       General Appearance:     Alert, cooperative and in no acute distress.   ENT/Mouth: membranes moist, no oral lesions or bleeding gums.      EYES:  no scleral icterus, normal conjunctivae   Neck: no carotid bruits or thyromegaly   Chest/Lungs:   lungs are clear to auscultation, no rales or wheezing, respirations unlabored   Cardiovascular:    Irregularly irregular. Normal first and second heart sounds with no murmurs, rubs, or gallops; the carotid, radial and posterior tibial pulses are intact, Jugular venous pressure normal, trace to 1+ edema bilateral lower extremities, wearing compression stockings   Abdomen:  Soft, nontender, nondistended, bowel sounds present   Extremities: no cyanosis or clubbing   Skin: warm, dry.    Neurologic: mood and affect are appropriate, alert and oriented x3      General: WNL  Eyes: WNL  Ears/Nose/Throat: WNL  Lungs: WNL  Heart: WNL  Stomach: WNL  Bladder: WNL  Muscle/Joints: WNL  Skin: WNL  Nervous System: WNL  Mental Health: WNL     Blood: WNL     Medical History  Surgical History Family  History Social History   Past Medical History:   Diagnosis Date   ? CAD (coronary artery disease)    ? Esophageal reflux    ? Hyperlipidemia    ? Hypertension     Past Surgical History:   Procedure Laterality Date   ? DE CABG, VEIN, SINGLE      Description: CABG (CABG);  Proc Date: 01/01/2008;  Comments: LIMA to LAD, SVG to diagonal and OM, radial artery to OM    History reviewed. No pertinent family history. Social History     Socioeconomic History   ? Marital status:      Spouse name: Not on file   ? Number of children: Not on file   ? Years of education: Not on file   ? Highest education level: Not on file   Occupational History   ? Not on file   Social Needs   ? Financial resource strain: Not on file   ? Food insecurity:     Worry: Not on file     Inability: Not on file   ? Transportation needs:     Medical: Not on file     Non-medical: Not on file   Tobacco Use   ? Smoking status: Never Smoker   ? Smokeless tobacco: Never Used   Substance and Sexual Activity   ? Alcohol use: Not on file   ? Drug use: No   ? Sexual activity: Not on file   Lifestyle   ? Physical activity:     Days per week: Not on file     Minutes per session: Not on file   ? Stress: Not on file   Relationships   ? Social connections:     Talks on phone: Not on file     Gets together: Not on file     Attends Gnosticism service: Not on file     Active member of club or organization: Not on file     Attends meetings of clubs or organizations: Not on file     Relationship status: Not on file   ? Intimate partner violence:     Fear of current or ex partner: Not on file     Emotionally abused: Not on file     Physically abused: Not on file     Forced sexual activity: Not on file   Other Topics Concern   ? Not on file   Social History Narrative   ? Not on file          Medications  Allergies   Current Outpatient Medications   Medication Sig Dispense Refill   ? cyanocobalamin (VITAMIN B-12) 1000 MCG tablet Take 1,000 mcg by mouth daily.     ?  folic acid (FOLVITE) 400 MCG tablet Take 400 mcg by mouth daily.     ? furosemide (LASIX) 20 MG tablet Take 2 tablets (40 mg total) by mouth daily. 180 tablet 1   ? losartan (COZAAR) 50 MG tablet TAKE 1 TABLET(50 MG) BY MOUTH DAILY 90 tablet 1   ? multivitamin therapeutic tablet Take 1 tablet by mouth daily.     ? nitroglycerin (NITROSTAT) 0.4 MG SL tablet PLACE 1 TABLET UNDER THE TONGUE EVERY 5 MINUTES AS NEEDED FOR CHEST PAIN. 3 Bottle 3   ? omeprazole (PRILOSEC) 20 MG capsule Take 20 mg by mouth daily.     ? PRADAXA 150 mg capsule TAKE 1 CAPSULE BY MOUTH TWICE DAILY 180 capsule 2   ? simvastatin (ZOCOR) 40 MG tablet TAKE 1 TABLET BY MOUTH DAILY 90 tablet 1   ? VIRTUSSIN AC  mg/5 mL liquid 5-10 mL.  2     No current facility-administered medications for this visit.       Allergies   Allergen Reactions   ? Nuts - Unspecified          Lab Results    Chemistry CBC BNP   Lab Results   Component Value Date    CREATININE 0.75 07/26/2019    BUN 18 07/26/2019     (L) 07/26/2019    K 4.5 07/26/2019    CL 95 (L) 07/26/2019    CO2 25 07/26/2019     Creatinine (mg/dL)   Date Value   07/26/2019 0.75   05/29/2019 0.82   08/15/2018 1.04   06/15/2018 1.05    Lab Results   Component Value Date    WBC 7.1 10/25/2016    HGB 14.2 10/25/2016    HCT 41.8 10/25/2016    MCV 97 10/25/2016     10/25/2016    Lab Results   Component Value Date     (H) 07/26/2019     BNP (pg/mL)   Date Value   07/26/2019 843 (H)   05/29/2019 441 (H)   06/07/2018 277 (H)          25 minutes were spent with the patient with greater than 50% spent on education and counseling.      Lauren Adame, [facility]   Heart Failure Clinic

## 2021-06-27 NOTE — PROGRESS NOTES
Progress Notes by Gelacio Martinez MD at 7/26/2019 10:10 AM     Author: Gelacio Martinez MD Service: -- Author Type: Physician    Filed: 7/26/2019 11:21 AM Encounter Date: 7/26/2019 Status: Signed    : Gelacio Martinez MD (Physician)       [facility] Progress Note    Assessment:  1.  Coronary artery disease.  He has noted some increased shortness of breath over the past number of months and was apparently in the ER in [place] for some lower extremity edema.  Laboratory studies drawn in May by his primary care physician does reflect some increased volume overload.  I am going to plan repeat laboratory studies today and would anticipate up titration of his diuretic.  He has undergone prior bypass surgery in 2008 in [facility].  At that time his ejection fraction before bypass was 30 to 35% some improvement in left ventricular function post bypass.  Most recent echocardiogram suggested ejection fraction of 40%.  He had a nuclear stress test in 2017 where there was no ischemia ejection fraction 53%.  We are going to plan follow-up nuclear stress test given some diminished exercise tolerance, shortness of breath and volume overload.    2.  Atrial fibrillation.  This is been chronic and persistent.  Does have some bradycardic tendency.  To monitor from October 2017 revealed some slight slower heartbeats.  Plan to repeat Holter monitor given some change in symptoms.  EKG today demonstrates atrial fibrillation with a heart rate of 54 bpm, right bundle branch block, TG unchanged from previous in 2017    3.  Dyslipidemia.  Lipids from May 2019 finds an LDL of 30.  Normal liver function tests.      Plan: 1.  Nuclear stress test.  2.  Chest x-ray.  3.  Holter monitor.  4.  Laboratory studies today.    Discussed the importance of curtailing the alcohol in his diet and following a prudent low-salt diet and weighing himself regularly.  I have asked that he follow-up again at heart failure clinic in  approximately 1 week.    1. CHF (congestive heart failure) (H)  Basic metabolic panel    Magnesium    BNP(B-type Natriuretic Peptide)    ECG Clinic - Today    XR Chest 2 Views    NM Pharmacologic Stress Test    Holter Monitor   2. Hyperlipidemia LDL goal <70     3. Atherosclerosis of native coronary artery of native heart without angina pectoris     4. Persistent atrial fibrillation (H)           An After Visit Summary was printed and given to the patient.    Subjective:    Ashok Garcia is a 78 y.o. male who returned for a planned  follow up visit.  He is accompanied by his son today.  He does winter in [place] and tells me he was seen in the ER on one occasion in [place] but cannot recall all the details.  He indicates that he was seen secondary to lower extremity edema.  He has noted some lower extremity edema as well as some increase in shortness of breath and diminished exercise tolerance.  He denies chest discomfort.  He tells me he is been trying to follow a lower salt diet.  He has fallen on a few occasions but has not had awareness of dizziness, syncope or near syncope.    Dr. Simms his primary care physician awilda some laboratory studies in May 2019 but this is the first time I am seeing the numbers.  His BNP was elevated 441 and sodium 127.    He does endorse a dry cough.  He states that he has been sleeping in his recliner but states only because he states he feels when he sleeps in the recliner.    Review of Systems:   General: WNL  Eyes: WNL  Ears/Nose/Throat: WNL  Lungs: Cough  Heart: Shortness of Breath with activity  Stomach: WNL  Bladder: WNL  Muscle/Joints: Muscle Weakness  Skin: WNL  Nervous System: Falls  Mental Health: WNL     Blood: WNL      Problem List:    Patient Active Problem List   Diagnosis   ? Hyperlipidemia LDL goal <70   ? Coronary Artery Disease   ? Obesity   ? Hypertension   ? Right bundle branch block   ? Esophageal Reflux   ? Persistent atrial fibrillation (H)   ?  Cardiomyopathy (H)   ? Chronic diastolic congestive heart failure (H)       Social History     Socioeconomic History   ? Marital status:      Spouse name: Not on file   ? Number of children: Not on file   ? Years of education: Not on file   ? Highest education level: Not on file   Occupational History   ? Not on file   Social Needs   ? Financial resource strain: Not on file   ? Food insecurity:     Worry: Not on file     Inability: Not on file   ? Transportation needs:     Medical: Not on file     Non-medical: Not on file   Tobacco Use   ? Smoking status: Never Smoker   ? Smokeless tobacco: Never Used   Substance and Sexual Activity   ? Alcohol use: Not on file   ? Drug use: No   ? Sexual activity: Not on file   Lifestyle   ? Physical activity:     Days per week: Not on file     Minutes per session: Not on file   ? Stress: Not on file   Relationships   ? Social connections:     Talks on phone: Not on file     Gets together: Not on file     Attends Scientologist service: Not on file     Active member of club or organization: Not on file     Attends meetings of clubs or organizations: Not on file     Relationship status: Not on file   ? Intimate partner violence:     Fear of current or ex partner: Not on file     Emotionally abused: Not on file     Physically abused: Not on file     Forced sexual activity: Not on file   Other Topics Concern   ? Not on file   Social History Narrative   ? Not on file       No family history on file.    Current Outpatient Medications   Medication Sig Dispense Refill   ? carvedilol (COREG) 6.25 MG tablet TAKE 1 TABLET(6.25 MG) BY MOUTH TWICE DAILY WITH MEALS 180 tablet 2   ? cyanocobalamin (VITAMIN B-12) 1000 MCG tablet Take 1,000 mcg by mouth daily.     ? folic acid (FOLVITE) 400 MCG tablet Take 400 mcg by mouth daily.     ? furosemide (LASIX) 20 MG tablet TAKE 1 TABLET(20 MG) BY MOUTH DAILY 90 tablet 1   ? losartan (COZAAR) 50 MG tablet TAKE 1 TABLET(50 MG) BY MOUTH DAILY 90 tablet 1    ? multivitamin therapeutic tablet Take 1 tablet by mouth daily.     ? nitroglycerin (NITROSTAT) 0.4 MG SL tablet PLACE 1 TABLET UNDER THE TONGUE EVERY 5 MINUTES AS NEEDED FOR CHEST PAIN. 25 tablet 6   ? omeprazole (PRILOSEC) 20 MG capsule Take 20 mg by mouth daily.     ? PRADAXA 150 mg capsule TAKE 1 CAPSULE BY MOUTH TWICE DAILY 180 capsule 0   ? simvastatin (ZOCOR) 40 MG tablet TAKE 1 TABLET BY MOUTH DAILY 90 tablet 1   ? VIRTUSSIN AC  mg/5 mL liquid 5-10 mL.  2   ? nitroglycerin (NITROSTAT) 0.4 MG SL tablet PLACE 1 TABLET UNDER THE TONGUE EVERY 5 MINUTES AS NEEDED FOR CHEST PAIN. 3 Bottle 3   ? simvastatin (ZOCOR) 40 MG tablet TAKE 1 TABLET BY MOUTH DAILY 90 tablet 1     No current facility-administered medications for this visit.        Objective:     /64 (Patient Site: Left Arm, Patient Position: Sitting, Cuff Size: Adult Large)   Pulse 60   Resp 16   Wt (!) 255 lb (115.7 kg)   BMI 33.64 kg/m    (!) 255 lb (115.7 kg)   48 pounds 2018.    Physical Exam:    GENERAL APPEARANCE: alert, no apparent distress  HEENT: no scleral icterus or xanthelasma  NECK: jugular venous pressure allergy to assess.  He is examined in the chair.  It was too difficult for him to get up into the examining table.  CHEST: symmetric, the lungs are clear to auscultation, diminished at the bases.  CARDIOVASCULAR: Distant, irregular, click, or gallop; no carotid bruits  Abdomen: No Organomegaly, masses, bruits, or tenderness. Bowels sounds are present, abdomen is protuberant.  He states that his pants have been feeling a little snug.      EXTREMITIES: no cyanosis, clubbing, mild edema    Cardiac Testing:  Performing Date Performing Department   2018  CARDIAC TESTING [[id number]]   Patient Information     Patient Name  Ashok Garcia MRN  [id number] Sex  Male              Age  1940 (78 y.o.)   Indications     Dyspnea, SOB, Wheezing, Tachypnea; Congestive heart failure; Atrial fibrillation   Dx:  A-fib (H) [I48.91 (ICD-10-CM)]; Fatigue [R53.83 (ICD-10-CM)]; SOB (shortness of breath) [R06.02 (ICD-10-CM)]   Summary       Technically difficult study due to body habitus and presence of intrathoracic air.    Normal left ventricular size with moderately impaired systolic function. LVEF is estimated at 40%.    Normal right ventricular systolic function.    No hemodynamically significant valvular abnormalities are identified.    When compared to the previous study dated 10/19/2016, no significant change.          NM Pharmacologic Stress Test   Order# [id number]   Reading physician: Marilee Pinzon MD Ordering physician: Gelacio Martinez MD Study date: 17   Patient Information     Patient Name  Ashok Garcia MRN  [id number] Sex  Male              Age  1940 (78 y.o.)   EKG Scan       Scan on: 17 9:45 AM by:    Indications     CAD (coronary artery disease)   Conclusion       The pharmacologic nuclear stress test is negative for inducible myocardial ischemia or infarction.    The left ventricular ejection fraction is 53% with abnormal septal motion consistent with postoperative state.    There is no prior study available.            Lab Results:    Lab Results   Component Value Date     (L) 2019    K 4.4 2019    CL 94 (L) 2019    CO2 22 2019    BUN 16 2019    CREATININE 0.82 2019    CALCIUM 9.2 2019     Lab Results   Component Value Date    CHOL 104 2019    TRIG 94 2019    HDL 55 2019     BNP (pg/mL)   Date Value   2019 441 (H)   2018 277 (H)   2011 1,929 (H)     Creatinine (mg/dL)   Date Value   2019 0.82   08/15/2018 1.04   06/15/2018 1.05   2018 0.93     LDL Calculated (mg/dL)   Date Value   2019 30   06/15/2018 55   2017 56     Lab Results   Component Value Date    WBC 7.1 10/25/2016    HGB 14.2 10/25/2016    HCT 41.8 10/25/2016    MCV 97 10/25/2016     10/25/2016               This  note has been dictated using voice recognition software. Any grammatical or context distortions are unintentional and inherent to the software.      Gelacio Martinez M.D., F.A.C.C.  [facility]  [phone]

## 2021-06-28 NOTE — PROGRESS NOTES
Progress Notes by Gelacio Martinez MD at 12/5/2019 11:10 AM     Author: Gelacio Martinez MD Service: -- Author Type: Physician    Filed: 12/5/2019 11:49 AM Encounter Date: 12/5/2019 Status: Signed    : Gelacio Martinez MD (Physician)             [facility] Progress Note    Assessment:  1.  Heart failure with preserved ejection fraction.  He is doing well with respect to monitoring his weights and fluid status is been stable.  He had documented improvement in BNP.  He is going to winter in [place] and I reminded him of the importance of monitoring his weights, continue to curtail his salt and alcohol.    2.  History of coronary artery disease.  No reported chest discomfort.  Nuclear stress test completed in August demonstrated no significant ischemia.    3.  Hypertension.  Blood pressure appears to be under good control.  He will continue to monitor his blood pressure.    4.  Persistent atrial fibrillation with bradycardic tendency.  He has been off carvedilol.  No profound bradycardia observed.  He continues on Pradaxa.  His preference was to continue with Pradaxa and not consider the watchman device at this point.  We did review the risk of bleeding including spontaneous bleeding and we did review the watchman procedure.  All to monitor completed in August demonstrated persistent atrial fibrillation with some bradycardic tendency which prompted discontinuation of beta-blocker.  Aware to monitor for dizziness or lightheadedness.    5.  Dyslipidemia.  Most recent lipids are from October 2019 where LDL cholesterol was 42, liver function test within normal limits and chemistry within normal limits.  He has historically tolerated simvastatin and with good lipid numbers I have left him on the current dose of simvastatin.          Plan: Outlined above with plan follow-up in 6 months when he returns from [place].    1. Chronic diastolic congestive heart failure (H)     2. Ischemic  cardiomyopathy     3. Atherosclerosis of native coronary artery of native heart without angina pectoris     4. Persistent atrial fibrillation           An After Visit Summary was printed and given to the patient.    Subjective:    Ashok Garcia is a 79 y.o. male who returned for a planned  follow up visit.  He reports that he is been feeling well.  He has been monitoring his weights closely and watches his salt in his diet.  He has had no chest discomfort, dizziness or lightheadedness.  Shortness of breath continues to be much improved.  He has been able to tolerate the current dose of furosemide 40 mg in the morning and 20 mg in the p.m. and on one occasion in October had to increase the dose for a few days.  He is aware to continue to monitor his weight and update us with any changes.    Outlined he has a history of coronary artery disease with remote bypass surgery in [facility].  When he returned from [place] last spring he had gained 25 pounds of fluid weight and in the interim we were able to diuresis him and now he is doing much better.  He is not reporting any specific cardiovascular symptoms and continues to be reasonably active.    We did a nuclear stress test that demonstrated ejection fraction 54% with no perfusion abnormality.    Review of Systems:   General: WNL  Eyes: WNL  Ears/Nose/Throat: WNL  Lungs: WNL  Heart: WNL  Stomach: WNL  Bladder: WNL  Muscle/Joints: WNL  Skin: WNL  Nervous System: WNL  Mental Health: WNL     Blood: WNL      Problem List:    Patient Active Problem List   Diagnosis   ? Hyperlipidemia LDL goal <70   ? Coronary Artery Disease   ? Obesity   ? Hypertension   ? Right bundle branch block   ? Esophageal Reflux   ? Persistent atrial fibrillation   ? Cardiomyopathy (H)   ? Chronic diastolic congestive heart failure (H)       Social History     Socioeconomic History   ? Marital status:      Spouse name: Not on file   ? Number of children: Not on file   ? Years of  education: Not on file   ? Highest education level: Not on file   Occupational History   ? Not on file   Social Needs   ? Financial resource strain: Not on file   ? Food insecurity:     Worry: Not on file     Inability: Not on file   ? Transportation needs:     Medical: Not on file     Non-medical: Not on file   Tobacco Use   ? Smoking status: Never Smoker   ? Smokeless tobacco: Never Used   Substance and Sexual Activity   ? Alcohol use: Not on file   ? Drug use: No   ? Sexual activity: Not on file   Lifestyle   ? Physical activity:     Days per week: Not on file     Minutes per session: Not on file   ? Stress: Not on file   Relationships   ? Social connections:     Talks on phone: Not on file     Gets together: Not on file     Attends Caodaism service: Not on file     Active member of club or organization: Not on file     Attends meetings of clubs or organizations: Not on file     Relationship status: Not on file   ? Intimate partner violence:     Fear of current or ex partner: Not on file     Emotionally abused: Not on file     Physically abused: Not on file     Forced sexual activity: Not on file   Other Topics Concern   ? Not on file   Social History Narrative   ? Not on file       No family history on file.    Current Outpatient Medications   Medication Sig Dispense Refill   ? cyanocobalamin (VITAMIN B-12) 1000 MCG tablet Take 1,000 mcg by mouth daily.     ? dabigatran etexilate (PRADAXA) 150 mg capsule Take 1 capsule (150 mg total) by mouth 2 (two) times a day. 180 capsule 2   ? folic acid (FOLVITE) 400 MCG tablet Take 400 mcg by mouth daily.     ? furosemide (LASIX) 40 MG tablet Take 1 tablet (40 mg total) by mouth daily. Patient to take 20mg dose in PM. 270 tablet 1   ? losartan (COZAAR) 50 MG tablet TAKE 1 TABLET(50 MG) BY MOUTH DAILY 90 tablet 0   ? magnesium oxide (MAG-OX) 400 mg (241.3 mg magnesium) tablet Take 1 tablet (400 mg total) by mouth daily. 90 tablet 0   ? multivitamin therapeutic tablet Take  1 tablet by mouth daily.     ? nitroglycerin (NITROSTAT) 0.4 MG SL tablet PLACE 1 TABLET UNDER THE TONGUE EVERY 5 MINUTES AS NEEDED FOR CHEST PAIN. 3 Bottle 3   ? omeprazole (PRILOSEC) 20 MG capsule Take 20 mg by mouth daily.     ? simvastatin (ZOCOR) 40 MG tablet TAKE 1 TABLET BY MOUTH DAILY 90 tablet 2   ? triamcinolone (KENALOG) 0.1 % cream Apply 1 application topically daily as needed.  6   ? VIRTUSSIN AC  mg/5 mL liquid 5-10 mL.  2     No current facility-administered medications for this visit.        Objective:     /68 (Patient Site: Right Arm, Patient Position: Sitting, Cuff Size: Adult Large)   Pulse 68   Resp 20   Ht 6' (1.829 m)   Wt 214 lb (97.1 kg)   BMI 29.02 kg/m    214 lb (97.1 kg)   [unfilled]  Wt Readings from Last 3 Encounters:   19 214 lb (97.1 kg)   10/16/19 215 lb (97.5 kg)   19 216 lb (98 kg)       Physical Exam:    GENERAL APPEARANCE: alert, no apparent distress  HEENT: no scleral icterus or xanthelasma  NECK: jugular venous pressure does not appear elevated.  Mildly challenging exam.  CHEST: symmetric, the lungs are clear to auscultation  CARDIOVASCULAR: Irregular,soft systolic murmur; no carotid bruits  Abdomen: No Organomegaly, masses, bruits, or tenderness. Bowels sounds are present      EXTREMITIES: no cyanosis, clubbing, mild edema, support stockings in place.    Cardiac Testin2019 10:59:11 HE JOES/JOHNS/SVETLANA/MAYLIN  Atrial fibrillation with slow ventricular response  Left axis deviation  Right bundle branch block  Abnormal ECG  When compared with ECG of 25-SEP-2017 13:58,  No significant change was found  Confirmed by ELDON ARRIOLA MD LOC:TAMMIE ([id number]) on 2019 3:46:47 PM  25mm/s 10mm/mV 150Hz 8.0 SP2 12SL 239 JANE: 2  Referred by: KATRIN ADAMS Confirmed By: ELDON LOC:[facility] ARRIOLA MD  Patient Information      Patient Name  Ashok Garcia MRN  [id number] Sex  Male              Age  1940 (78 y.o.)   Indications      Dyspnea, SOB,  Wheezing, Tachypnea; Congestive heart failure; Atrial fibrillation   Dx: A-fib (H) [I48.91 (ICD-10-CM)]; Fatigue [R53.83 (ICD-10-CM)]; SOB (shortness of breath) [R06.02 (ICD-10-CM)]   Summary        Technically difficult study due to body habitus and presence of intrathoracic air.    Normal left ventricular size with moderately impaired systolic function. LVEF is estimated at 40%.    Normal right ventricular systolic function.    No hemodynamically significant valvular abnormalities are identified.    When compared to the previous study dated 10/19/2016, no significant change.         NM Pharmacologic Stress Test   Order# [id number]   Reading physician: Librado Ballard DO Ordering physician: Gelacio Martinez MD Study date: 19   Patient Information      Patient Name  Ashok Garcia MRN  [id number] Sex  Male              Age  1940 (78 y.o.)   EKG Scan        Scan on: 19 12:55 PM by:    Indications      CHF (congestive heart failure) (H)   Conclusion        The pharmacologic nuclear stress test is negative for inducible myocardial ischemia or infarction.    The left ventricular ejection fraction is 54% with abnormal septal motion consistent with postoperative state    When compared to the images of 2017, there has been no significant change.              Lab Results:    Lab Results   Component Value Date     10/23/2019    K 4.2 10/23/2019     10/23/2019    CO2 25 10/23/2019    BUN 15 10/23/2019    CREATININE 1.04 10/23/2019    CALCIUM 9.8 10/23/2019     Lab Results   Component Value Date    CHOL 121 10/23/2019    TRIG 146 10/23/2019    HDL 50 10/23/2019     BNP (pg/mL)   Date Value   2019 186 (H)   08/15/2019 898 (H)   2019 843 (H)     Creatinine (mg/dL)   Date Value   10/23/2019 1.04   2019 0.84   2019 1.04   08/15/2019 0.95     LDL Calculated (mg/dL)   Date Value   10/23/2019 42   2019 30   06/15/2018 55     Lab Results   Component Value Date     WBC 7.1 10/25/2016    HGB 14.2 10/25/2016    HCT 41.8 10/25/2016    MCV 97 10/25/2016     10/25/2016         This note has been dictated using voice recognition software. Any grammatical or context distortions are unintentional and inherent to the software.

## 2021-06-28 NOTE — PROGRESS NOTES
Progress Notes by Lauren Adame CNP at 10/16/2019 10:30 AM     Author: Lauren Adame CNP Service: -- Author Type: Nurse Practitioner    Filed: 10/16/2019 11:06 AM Encounter Date: 10/16/2019 Status: Signed    : Lauren Adame CNP (Nurse Practitioner)             Assessment/Recommendations   Assessment:    1.  Heart failure with preserved ejection fraction, NYHA class II: Compensated.  He denies dyspnea on exertion, orthopnea, or PND.  He has mild fatigue.  His weights have remained stable.  He lives alone and is difficult for him to follow a low-sodium diet.  He eats a lot of processed food.  He reads food labels.    2.  Persistent atrial fibrillation: Rate controlled.  He continues Pradaxa for anticoagulation.    3.  Hypertension: Controlled.  Blood pressure 112/56    Plan:  1.  Continue current medications  2.  Continue daily weights and low-sodium diet  3.  Continue regular exercise    Ashok Garcia will follow up with Dr. Martinez December 5 and in the heart failure clinic in May.     History of Present Illness/Subjective    Mr. Ashok Garcia is a 78 y.o. male seen at [facility] heart failure clinic today for continued follow-up.  His son accompanies him today.  He follows up for heart failure with preserved ejection fraction.  He had a nuclear stress test August 5, 2019 which showed an ejection fraction of 54% and was negative for inducible myocardial ischemia or infarction.  He has a past medical history significant for hypertension, coronary artery disease, hyperlipidemia, and persistent atrial fibrillation.  Status post CABG.     Today, he states he is doing well.  He denies dyspnea on exertion, orthopnea, or PND.  He has mild fatigue.  He denies dizziness or lightheadedness.  He has trace ankle edema.  He denies lightheadedness, shortness of breath, dyspnea on exertion, orthopnea, PND, palpitations, chest pain and abdominal fullness/bloating.      He is monitoring  home weights which are stable between 207-208 pounds.  He is following a low sodium diet.  He participates in regular physical activity including riding his stationary bike 3 times a week.  He plans on traveling to [place] for the winter and returning in May.        Physical Examination Review of Systems   Vitals:    10/16/19 1027   BP: 112/56   Pulse: 76   Resp: 16     Body mass index is 28.49 kg/m .  Wt Readings from Last 3 Encounters:   10/16/19 215 lb (97.5 kg)   09/16/19 216 lb (98 kg)   08/15/19 (!) 255 lb (115.7 kg)       General Appearance:   no distress, normal body habitus   ENT/Mouth: membranes moist, no oral lesions or bleeding gums.      EYES:  no scleral icterus, normal conjunctivae   Neck: no carotid bruits or thyromegaly   Chest/Lungs:   lungs are clear to auscultation, no rales or wheezing, equal chest wall expansion    Cardiovascular:    Irregularly irregular. Normal first and second heart sounds with no murmurs, rubs, or gallops; Jugular venous pressure normal, trace edema bilaterally    Abdomen:  no organomegaly, masses, bruits, or tenderness; bowel sounds are present   Extremities: no cyanosis or clubbing   Skin: no xanthelasma, warm.    Neurologic: normal  bilateral, no tremors     Psychiatric: alert and oriented x3    General: WNL  Eyes: WNL  Ears/Nose/Throat: WNL  Lungs: WNL  Heart: WNL  Stomach: WNL  Bladder: WNL  Muscle/Joints: WNL  Skin: WNL  Nervous System: WNL  Mental Health: WNL     Blood: WNL     Medical History  Surgical History Family History Social History   Past Medical History:   Diagnosis Date   ? CAD (coronary artery disease)    ? Esophageal reflux    ? Hyperlipidemia    ? Hypertension     Past Surgical History:   Procedure Laterality Date   ? [place] CABG, VEIN, SINGLE      Description: CABG (CABG);  Proc Date: 01/01/2008;  Comments: LIMA to LAD, SVG to diagonal and OM, radial artery to OM    History reviewed. No pertinent family history. Social History     Socioeconomic History    ? Marital status:      Spouse name: Not on file   ? Number of children: Not on file   ? Years of education: Not on file   ? Highest education level: Not on file   Occupational History   ? Not on file   Social Needs   ? Financial resource strain: Not on file   ? Food insecurity:     Worry: Not on file     Inability: Not on file   ? Transportation needs:     Medical: Not on file     Non-medical: Not on file   Tobacco Use   ? Smoking status: Never Smoker   ? Smokeless tobacco: Never Used   Substance and Sexual Activity   ? Alcohol use: Not on file   ? Drug use: No   ? Sexual activity: Not on file   Lifestyle   ? Physical activity:     Days per week: Not on file     Minutes per session: Not on file   ? Stress: Not on file   Relationships   ? Social connections:     Talks on phone: Not on file     Gets together: Not on file     Attends [employer] service: Not on file     Active member of club or organization: Not on file     Attends meetings of clubs or organizations: Not on file     Relationship status: Not on file   ? Intimate partner violence:     Fear of current or ex partner: Not on file     Emotionally abused: Not on file     Physically abused: Not on file     Forced sexual activity: Not on file   Other Topics Concern   ? Not on file   Social History Narrative   ? Not on file          Medications  Allergies   Current Outpatient Medications   Medication Sig Dispense Refill   ? cyanocobalamin (VITAMIN B-12) 1000 MCG tablet Take 1,000 mcg by mouth daily.     ? dabigatran etexilate (PRADAXA) 150 mg capsule Take 1 capsule (150 mg total) by mouth 2 (two) times a day. 180 capsule 2   ? folic acid (FOLVITE) 400 MCG tablet Take 400 mcg by mouth daily.     ? furosemide (LASIX) 40 MG tablet Take 1 tablet (40 mg total) by mouth daily. Patient to take 20mg dose in PM. 270 tablet 1   ? losartan (COZAAR) 50 MG tablet TAKE 1 TABLET(50 MG) BY MOUTH DAILY 90 tablet 1   ? magnesium oxide (MAG-OX) 400 mg (241.3 mg magnesium)  tablet Take 1 tablet (400 mg total) by mouth daily. 90 tablet 0   ? multivitamin therapeutic tablet Take 1 tablet by mouth daily.     ? nitroglycerin (NITROSTAT) 0.4 MG SL tablet PLACE 1 TABLET UNDER THE TONGUE EVERY 5 MINUTES AS NEEDED FOR CHEST PAIN. 3 Bottle 3   ? omeprazole (PRILOSEC) 20 MG capsule Take 20 mg by mouth daily.     ? simvastatin (ZOCOR) 40 MG tablet TAKE 1 TABLET BY MOUTH DAILY 90 tablet 2   ? VIRTUSSIN AC  mg/5 mL liquid 5-10 mL.  2     No current facility-administered medications for this visit.     Allergies   Allergen Reactions   ? Nuts - Unspecified          Lab Results    Chemistry/lipid CBC Cardiac Enzymes/BNP/TSH/INR   Lab Results   Component Value Date    CHOL 104 05/29/2019    HDL 55 05/29/2019    LDLCALC 30 05/29/2019    TRIG 94 05/29/2019    CREATININE 0.84 09/16/2019    BUN 17 09/16/2019    K 4.5 09/27/2019     (L) 09/16/2019     09/16/2019    CO2 23 09/16/2019    Lab Results   Component Value Date    WBC 7.1 10/25/2016    HGB 14.2 10/25/2016    HCT 41.8 10/25/2016    MCV 97 10/25/2016     10/25/2016    Lab Results   Component Value Date    CKTOTAL 184 11/07/2011    TROPONINI 0.07 11/08/2011     (H) 09/16/2019    TSH 2.69 06/15/2018    INR 1.10 11/05/2011

## 2021-06-28 NOTE — PROGRESS NOTES
Progress Notes by Gelacio Martinez MD at 9/16/2019  3:10 PM     Author: Gelacio Martinez MD Service: -- Author Type: Physician    Filed: 9/16/2019  4:41 PM Encounter Date: 9/16/2019 Status: Signed    : Gelacio Martinez MD (Physician)       [facility] Progress Note    Assessment:  1.  Heart failure with preserved ejection fraction.  Functional class III.  Significant improvement with the higher dose of furosemide now currently at 40 mg twice daily.  Recent laboratory studies from a number of weeks ago demonstrated stable BUN and creatinine with mildly reduced sodium although improved.  Had a BNP from August that was 894 which was up from 441 May 2019.  Gave him a new prescription for Lasix 40 mg twice daily and will make additional comments on the dose pending follow-up laboratory studies.  He will continue to monitor his weights and volume status.  He will continue monitor his salt in his diet which we discussed again today.    2.  History of coronary artery disease.  No reported chest discomfort.  Shortness of breath symptoms are improved.  Recent nuclear stress test was negative for ischemia.      3.  Hypertension.  Pressure improved with provement and fluid status.  He will continue to monitor.  He is on losartan, furosemide and beta-blocker discontinued secondary to increased symptoms of fatigue and slower heart rate and atrial fibrillation.  He reports feeling better.    4.  Persistent atrial fibrillation with a bradycardic tendency.  He has been off carvedilol.  No profound bradycardia observed.  He will continue to monitor for symptoms of dizziness or lightheadedness.  At this point we will leave him off beta-blocker and monitor closely.  He is on Pradaxa.    5.  Dyslipidemia.  Lipids from May finds an LDL of 30 with normal liver function test.  His current list of medicines does not include statin will need to clarify that he is indeed still taking statin.    Plan: As outlined above  with follow-up in a few months.  Await laboratory studies and plan further recommendations.    1. Ischemic cardiomyopathy     2. CHF (congestive heart failure), NYHA class II, chronic, diastolic (H)  furosemide (LASIX) 40 MG tablet    Basic metabolic panel    Magnesium    BNP(B-type Natriuretic Peptide)   3. Chronic diastolic congestive heart failure (H)     4. Atherosclerosis of native coronary artery of native heart without angina pectoris     5. Persistent atrial fibrillation (H)           An After Visit Summary was printed and given to the patient.    Subjective:    Ashok Garcia is a 78 y.o. male who returned for a planned  follow up visit.  He returns from [place] and we visited a few months ago.  He has been seen in [place] for apparently increasing lower extremity edema with limited details.  He indicated at that time that he was having some increasing shortness of breath and worsening lower extremity edema.  He had evidence for volume overload.  Nuclear stress test was performed where ejection fraction was 54% with no perfusion abnormality identified.  His furosemide was increased to 40 mg twice daily.  He is feeling much better.  He is lost approximately 25+ pounds over the past few months with the higher doses of diuretic.  Indicates that he has been trying to do a good job with respect to watching the salt in his diet.  If he tries to do regular walking primarily in his house.  He also has a stationary bike.  He has a history of persistent atrial fibrillation.    Review of Systems:   General: WNL  Eyes: WNL  Ears/Nose/Throat: WNL  Lungs: WNL  Heart: WNL  Stomach: WNL  Bladder: WNL  Muscle/Joints: WNL  Skin: WNL  Nervous System: WNL  Mental Health: WNL     Blood: WNL      Problem List:    Patient Active Problem List   Diagnosis   ? Hyperlipidemia LDL goal <70   ? Coronary Artery Disease   ? Obesity   ? Hypertension   ? Right bundle branch block   ? Esophageal Reflux   ? Persistent atrial fibrillation  (H)   ? Cardiomyopathy (H)   ? Chronic diastolic congestive heart failure (H)       Social History     Socioeconomic History   ? Marital status:      Spouse name: Not on file   ? Number of children: Not on file   ? Years of education: Not on file   ? Highest education level: Not on file   Occupational History   ? Not on file   Social Needs   ? Financial resource strain: Not on file   ? Food insecurity:     Worry: Not on file     Inability: Not on file   ? Transportation needs:     Medical: Not on file     Non-medical: Not on file   Tobacco Use   ? Smoking status: Never Smoker   ? Smokeless tobacco: Never Used   Substance and Sexual Activity   ? Alcohol use: Not on file   ? Drug use: No   ? Sexual activity: Not on file   Lifestyle   ? Physical activity:     Days per week: Not on file     Minutes per session: Not on file   ? Stress: Not on file   Relationships   ? Social connections:     Talks on phone: Not on file     Gets together: Not on file     Attends [employer] service: Not on file     Active member of club or organization: Not on file     Attends meetings of clubs or organizations: Not on file     Relationship status: Not on file   ? Intimate partner violence:     Fear of current or ex partner: Not on file     Emotionally abused: Not on file     Physically abused: Not on file     Forced sexual activity: Not on file   Other Topics Concern   ? Not on file   Social History Narrative   ? Not on file       No family history on file.    Current Outpatient Medications   Medication Sig Dispense Refill   ? cyanocobalamin (VITAMIN B-12) 1000 MCG tablet Take 1,000 mcg by mouth daily.     ? folic acid (FOLVITE) 400 MCG tablet Take 400 mcg by mouth daily.     ? furosemide (LASIX) 40 MG tablet Take 1 tablet (40 mg total) by mouth 2 (two) times a day. 180 tablet 4   ? losartan (COZAAR) 50 MG tablet TAKE 1 TABLET(50 MG) BY MOUTH DAILY 90 tablet 1   ? multivitamin therapeutic tablet Take 1 tablet by mouth daily.     ?  "nitroglycerin (NITROSTAT) 0.4 MG SL tablet PLACE 1 TABLET UNDER THE TONGUE EVERY 5 MINUTES AS NEEDED FOR CHEST PAIN. 3 Bottle 3   ? omeprazole (PRILOSEC) 20 MG capsule Take 20 mg by mouth daily.     ? PRADAXA 150 mg capsule TAKE 1 CAPSULE BY MOUTH TWICE DAILY 180 capsule 2   ? simvastatin (ZOCOR) 40 MG tablet TAKE 1 TABLET BY MOUTH DAILY 90 tablet 2   ? VIRTUSSIN AC  mg/5 mL liquid 5-10 mL.  2     No current facility-administered medications for this visit.        Objective:     /58 (Patient Site: Left Arm, Patient Position: Sitting, Cuff Size: Adult Regular)   Pulse 68   Resp 16   Ht 6' 0.84\" (1.85 m)   Wt 216 lb (98 kg)   BMI 28.63 kg/m    216 lb (98 kg)   Body mass index is 33.64 kg/m .      Wt Readings from Last 3 Encounters:   08/15/19 (!) 255 lb (115.7 kg)   19 (!) 255 lb (115.7 kg)   08/15/18 (!) 237 lb (107.5 kg)         Physical Exam:    GENERAL APPEARANCE: alert, no apparent distress  HEENT: no scleral icterus or xanthelasma  NECK: jugular venous pressure does not appear elevated on today's examination.  CHEST: symmetric, the lungs are clear to auscultation, no rales detected.  CARDIOVASCULAR: Irregular rhythm, soft systolic murmur no carotid bruits  Abdomen: No Organomegaly, masses, bruits, or tenderness. Bowels sounds are present      EXTREMITIES: no cyanosis, clubbing, mild edema limited to the ankles.    Cardiac Testin2019 10:59:11 HE JOES/JOHNS/SVETLANA/MAYLIN  Atrial fibrillation with slow ventricular response  Left axis deviation  Right bundle branch block  Abnormal ECG  When compared with ECG of 25-SEP-2017 13:58,  No significant change was found  Confirmed by ELDON ARRIOLA MD LOC:TAMMIE ([id number]) on 2019 3:46:47 PM  25mm/s 10mm/mV 150Hz 8.0 SP2 12SL 239 JANE: 2  Referred by: KATRIN ADAMS Confirmed By: ELDON LOC:TAMMIE ARRIOLA MD  Patient Information     Patient Name  Ashok Garcia MRN  [id number] Sex  Male              Age  1940 (78 y.o.)   Indications     Dyspnea, " SOB, Wheezing, Tachypnea; Congestive heart failure; Atrial fibrillation   Dx: A-fib (H) [I48.91 (ICD-10-CM)]; Fatigue [R53.83 (ICD-10-CM)]; SOB (shortness of breath) [R06.02 (ICD-10-CM)]   Summary       Technically difficult study due to body habitus and presence of intrathoracic air.    Normal left ventricular size with moderately impaired systolic function. LVEF is estimated at 40%.    Normal right ventricular systolic function.    No hemodynamically significant valvular abnormalities are identified.    When compared to the previous study dated 10/19/2016, no significant change.        NM Pharmacologic Stress Test   Order# [id number]   Reading physician: Librado Ballard DO Ordering physician: Gelacio Martinez MD Study date: 19   Patient Information     Patient Name  Ashok Garcia MRN  [id number] Sex  Male              Age  1940 (78 y.o.)   EKG Scan       Scan on: 19 12:55 PM by:    Indications     CHF (congestive heart failure) (H)   Conclusion       The pharmacologic nuclear stress test is negative for inducible myocardial ischemia or infarction.    The left ventricular ejection fraction is 54% with abnormal septal motion consistent with postoperative state    When compared to the images of 2017, there has been no significant change.        Conclusion        INTERPRETATION:  2019     TEST DATE:  2019     INTERPRETATION:  Predominant rhythm is atrial fibrillation with ventricular response  ranging from 30 beats per minute to 84 beats per minute, averaging 57 beats per  minute.  There is a tendency towards slow ventricular response with average resting  ventricular response 50 to 55 beats per minute and ventricular response with  activity 55 to 80 beats per minute.  There was a single 3.2 second R-R interval at  6:05 a.m. of uncertain clinical significance.  The patient has right bundle-branch  block aberrancy throughout the monitoring period.  Occasional ventricular  premature  beats were noted, 1296.  There were several PVC morphologies and a single triplet  noted.  No symptoms were noted in the diary.     CONCLUSION:  Persistent atrial fibrillation with some tendency towards slow  ventricular response.  A single isolated 3.2 second R-R interval noted.  Occasional  ventricular premature beats also noted, along with right bundle branch block.        ZULMA PEREZ MD  cn  D 08/07/2019 13:42:58  T 08/07/2019 16:21:20  R 08/07/2019 16:21:20  [id number]             Lab Results:    Lab Results   Component Value Date     (L) 08/28/2019    K 4.1 08/28/2019    CL 96 (L) 08/28/2019    CO2 30 08/28/2019    BUN 18 08/28/2019    CREATININE 1.04 08/28/2019    CALCIUM 10.0 08/28/2019     Lab Results   Component Value Date    CHOL 104 05/29/2019    TRIG 94 05/29/2019    HDL 55 05/29/2019     BNP (pg/mL)   Date Value   08/15/2019 898 (H)   07/26/2019 843 (H)   05/29/2019 441 (H)     Creatinine (mg/dL)   Date Value   08/28/2019 1.04   08/15/2019 0.95   07/26/2019 0.75   05/29/2019 0.82     LDL Calculated (mg/dL)   Date Value   05/29/2019 30   06/15/2018 55   08/03/2017 56     Lab Results   Component Value Date    WBC 7.1 10/25/2016    HGB 14.2 10/25/2016    HCT 41.8 10/25/2016    MCV 97 10/25/2016     10/25/2016               This note has been dictated using voice recognition software. Any grammatical or context distortions are unintentional and inherent to the software.      Gelacio Martinez M.D., F.A.C.C.  [facility] [place] [facility]  [phone]

## 2021-06-29 NOTE — PROGRESS NOTES
"Progress Notes by Gelacio Martinez MD at 6/8/2020 12:50 PM     Author: Gelacio Martinez MD Service: -- Author Type: Physician    Filed: 6/10/2020  3:39 PM Encounter Date: 6/8/2020 Status: Addendum    : Gelacio Martinez MD (Physician)    Related Notes: Original Note by Gelacio Martinez MD (Physician) filed at 6/8/2020  1:21 PM           The patient has been notified of following:     \"This telephone visit will be conducted via a call between you and your physician/provider. We have found that certain health care needs can be provided without the need for a physical exam.  This service lets us provide the care you need with a phone conversation.  If a prescription is necessary we can send it directly to your pharmacy.  If lab work is needed we can place an order for that and you can then stop by our lab to have the test done at a later time. If during the course of the call the physician/provider feels a telephone visit is not appropriate, you will not be charged for this service.\" Verbal consent has been obtained for this service by care team member:         HEART CARE PHONE ENCOUNTER        The patient has chosen to have the visit conducted as a telephone visit, to reduce risk of exposure given the current status of Coronavirus in our community. This telephone visit is being conducted via a call between the patient and physician/provider. Health care needs are being provided without a physical exam.     Assessment/Recommendations   Assessment:    1.  Heart failure with preserved ejection fraction.  He reports some increased weight gain although  this has been gradual without increasing shortness of breath or lower extremity edema or orthopnea.  We are going to plan laboratory studies including a basic metabolic profile and BNP and will comment once this is available for review.  2.  History of coronary artery disease with remote bypass surgery.  No anginal chest discomfort with a negative nuclear stress test " August 2019 without findings of ischemia.  3.  Hypertension.  Blood pressures appear to be under good control based on previous blood pressure readings.  4.  Persistent atrial fibrillation with bradycardic tendency off carvedilol.  No symptoms of dizziness or lightheadedness.  He continues on Pradaxa and understands the risks of bleeding versus the risks of stroke  5.  Dyslipidemia.  He remains on simvastatin.  His LDL cholesterol October 2019 of 42..  Plan:  1.  Laboratory studies and follow-up in 1 month with nurse practitioner.      Follow Up Plan: Follow up in   I have reviewed the note as documented.  This accurately captures the substance of my conversation with the patient.    Total time of call between patient and provider was 16 minutes   Start Time:1254  Stop Time:110       History of Present Illness/Subjective    Ashok Garcia is a 79 y.o. male who is being evaluated via a billable telephone visit.    We tried video conferencing today but it was not able to connect.  Therefore we discussed by telephone.  He reports overall he is been feeling well.  He has not had any significant increase in shortness of breath and denies chest pain orthopnea or PND.  He does state that his weight continues to be increased despite increasing the furosemide.  However he does indicate to me that the weight gain has been gradual and has been more sedentary as of late.  He indicates that lower extremity edema has not been significantly increased and his weights have been ranging between 212 pounds and 217 pounds compared to prior weight 208 pounds.  He visited with the heart failure nurse practitioner May 12 who increased the Lasix to 40 mg p.o. twice daily but has not noted any significant weight change.  He has a history of coronary artery disease with remote bypass surgery in [facility] a number of years ago.  Patient experienced fluid retention  approximately 1 year ago and had a significantly elevated BNP and  responded to diuretics at that time.  He had a nuclear stress test at that time 2019 where ejection fraction was 54% without perfusion abnormality.  He has not had any significant chest pain  NM Pharmacologic Stress Test   Order# [id number]   Reading physician: Librado Ballard DO  Ordering physician: Gelacio Martinez MD  Study date: 19    Patient Information     Patient Name   Ashok Garcia  MRN   [id number]  Sex   Male   1   1940 (78 y.o.)    EKG Scan      Stress Test Data - Scan on 19 12:55 PM by     Indications     CHF (congestive heart failure) (H)    Conclusion       The pharmacologic nuclear stress test is negative for inducible myocardial ischemia or infarction.    The left ventricular ejection fraction is 54% with abnormal septal motion consistent with postoperative state    When compared to the images of 2017, there has been no significant change.        NM Pharmacologic Stress Test   Order# [id number]   Reading physician: Librado Ballard DO  Ordering physician: Gelacio Martinez MD  Study date: 19    Patient Information     Patient Name   Ashok Garcia  MRN   [id number]  Sex   Male   1   1940 (78 y.o.)    EKG Scan      Stress Test Data - Scan on 19 12:55 PM by     Indications     CHF (congestive heart failure) (H)    Conclusion       The pharmacologic nuclear stress test is negative for inducible myocardial ischemia or infarction.    The left ventricular ejection fraction is 54% with abnormal septal motion consistent with postoperative state    When compared to the images of 2017, there has been no significant change.          I have reviewed and updated the patient's Past Medical History, Social History, Family History and Medication List.     Physical Examination not performed given phone encounter Review of Systems                                                Medical History  Surgical History Family History Social History   Past  Medical History:   Diagnosis Date   ? CAD (coronary artery disease)    ? Esophageal reflux    ? Hyperlipidemia    ? Hypertension     Past Surgical History:   Procedure Laterality Date   ? [place] CABG, VEIN, SINGLE      Description: CABG (CABG);  Proc Date: 01/01/2008;  Comments: LIMA to LAD, SVG to diagonal and OM, radial artery to OM    No family history on file. Social History     Socioeconomic History   ? Marital status:      Spouse name: Not on file   ? Number of children: Not on file   ? Years of education: Not on file   ? Highest education level: Not on file   Occupational History   ? Not on file   Social Needs   ? Financial resource strain: Not on file   ? Food insecurity     Worry: Not on file     Inability: Not on file   ? Transportation needs     Medical: Not on file     Non-medical: Not on file   Tobacco Use   ? Smoking status: Never Smoker   ? Smokeless tobacco: Never Used   Substance and Sexual Activity   ? Alcohol use: Not on file   ? Drug use: No   ? Sexual activity: Not on file   Lifestyle   ? Physical activity     Days per week: Not on file     Minutes per session: Not on file   ? Stress: Not on file   Relationships   ? Social connections     Talks on phone: Not on file     Gets together: Not on file     Attends [employer] service: Not on file     Active member of club or organization: Not on file     Attends meetings of clubs or organizations: Not on file     Relationship status: Not on file   ? Intimate partner violence     Fear of current or ex partner: Not on file     Emotionally abused: Not on file     Physically abused: Not on file     Forced sexual activity: Not on file   Other Topics Concern   ? Not on file   Social History Narrative   ? Not on file          Medications  Allergies   Current Outpatient Medications   Medication Sig Dispense Refill   ? cyanocobalamin (VITAMIN B-12) 1000 MCG tablet Take 1,000 mcg by mouth daily.     ? dabigatran etexilate (PRADAXA) 150 mg capsule Take 1  capsule (150 mg total) by mouth 2 (two) times a day. 180 capsule 2   ? folic acid (FOLVITE) 400 MCG tablet Take 400 mcg by mouth daily.     ? furosemide (LASIX) 40 MG tablet Take 1 tablet (40 mg total) by mouth 2 (two) times a day at 9am and 6pm. Pt started 40mg two times a day 5/5/20 270 tablet 1   ? losartan (COZAAR) 50 MG tablet TAKE 1 TABLET(50 MG) BY MOUTH DAILY 90 tablet 1   ? magnesium oxide (MAG-OX) 400 mg (241.3 mg magnesium) tablet TAKE 1 TABLET(400 MG) BY MOUTH DAILY 90 tablet 1   ? multivitamin therapeutic tablet Take 1 tablet by mouth daily.     ? nitroglycerin (NITROSTAT) 0.4 MG SL tablet PLACE 1 TABLET UNDER THE TONGUE EVERY 5 MINUTES AS NEEDED FOR CHEST PAIN. 3 Bottle 3   ? omeprazole (PRILOSEC) 20 MG capsule Take 20 mg by mouth daily.     ? simvastatin (ZOCOR) 40 MG tablet TAKE 1 TABLET BY MOUTH DAILY 90 tablet 2   ? triamcinolone (KENALOG) 0.1 % cream Apply 1 application topically daily as needed.  6   ? VIRTUSSIN AC  mg/5 mL liquid 5-10 mL.  2     No current facility-administered medications for this visit.     Allergies   Allergen Reactions   ? Nuts - Unspecified          Lab Results    Chemistry/lipid CBC Cardiac Enzymes/BNP/TSH/INR   Lab Results   Component Value Date    CHOL 121 10/23/2019    HDL 50 10/23/2019    LDLCALC 42 10/23/2019    TRIG 146 10/23/2019    CREATININE 1.04 10/23/2019    BUN 15 10/23/2019    K 4.2 10/23/2019     10/23/2019     10/23/2019    CO2 25 10/23/2019    Lab Results   Component Value Date    WBC 7.1 10/25/2016    HGB 14.2 10/25/2016    HCT 41.8 10/25/2016    MCV 97 10/25/2016     10/25/2016    Lab Results   Component Value Date    CKTOTAL 184 11/07/2011    TROPONINI 0.07 11/08/2011     (H) 09/16/2019    TSH 2.69 06/15/2018    INR 1.10 11/05/2011        Gelacio Martinez

## 2021-06-29 NOTE — PROGRESS NOTES
"Progress Notes by Lauren Adame CNP at 5/5/2020  1:30 PM     Author: Lauren Adame CNP Service: -- Author Type: Nurse Practitioner    Filed: 5/5/2020  1:55 PM Encounter Date: 5/5/2020 Status: Signed    : Lauren Adame CNP (Nurse Practitioner)           The patient has been notified of following:     \"This video visit will be conducted via a call between you and your physician/provider. We have found that certain health care needs can be provided without the need for an in-person physical exam.  This service lets us provide the care you need with a video conversation.  If a prescription is necessary we can send it directly to your pharmacy.  If lab work is needed we can place an order for that and you can then stop by our lab to have the test done at a later time.      Patient has given verbal consent to a Video visit? Yes    HEART CARE VIDEO ENCOUNTER        The patient has chosen to have the visit conducted as a video visit, to reduce risk of exposure given the current status of Coronavirus in our community. This video visit is being conducted via a call between the patient and physician/provider. Health care needs are being provided without a physical exam.     Assessment/Recommendations   Assessment:  1.  Heart failure with preserved ejection fraction: He states he has had about 8 pound weight gain through the winter.  He traveled to [place] and came back early April.  He states his weight today was 215 pounds.  His typical weight is 207 pounds.  He denies dyspnea on exertion, orthopnea, PND.  He does have mild lower extremity edema.  He is wearing compression socks.  He states he has been eating frozen potpies but has stopped.    Plan:  1.  Increase Lasix to 40 mg twice a day for 1 week.  I will have my nurse call him next week to check in  2.  Stressed importance of low-sodium diet and daily weights  3.  Encouraged regular exercise  4.  Discussed importance of staying home due to " coronavirus    Follow Up Plan: With Dr. Martinez in June and in the heart failure clinic in September  I have reviewed the note as documented.  This accurately captures the substance of my conversation with the patient.    Total time of video between patient and provider was 18 minutes   Start Time: 1329   Stop Time: 1347    Originating Location (pt. Location): home    Distant Location (provider location):  [facility] HE[facility] CARE      Mode of Communication:  Video Conference via [email or web address]       History of Present Illness/Subjective    Ashok Garcia is a 79 y.o. male who is being evaluated via a billable video visit and has consented to a video visit. Ashok Garcia has a history of heart failure with preserved ejection fraction, hypertension, CAD, hyperlipidemia, persistent atrial fibrillation.  Status post CABG.  He had a nuclear stress test August 20 19 which showed an ejection fraction of 54% and was negative for inducible myocardial ischemia or infarction.  He has traveled to [place] for the winter and came back to [place] early April.    He states he has had about an 8 pound weight gain over the winter.  He has gained about 3 pounds since being home in [place].  He denies dyspnea on exertion, orthopnea, PND.  He does have mild lower extremity edema noted.  He is wearing compression socks.  He denies chest pain.  He denies abdominal bloating.    His weight today was 215 pounds.  He states his normal weight is around 207 pounds.  He is trying to follow a low-sodium diet.  He states he was eating frozen potpies but has stopped.      I have reviewed and updated the patient's Past Medical History, Social History, Family History and Medication List.     Physical Examination performed via live video encounter Review of Systems   General Appearance:   no distress, normal body habitus, upright.   ENT/Mouth: membranes moist, no nasal discharge or bleeding gums.  Normal head shape, no evidence of injury or  laceration.     EYES:  no scleral icterus, normal conjunctivae   Neck: no evidence of thyromegaly.  Supple   Chest/Lungs:   No audible wheezing equal chest wall expansion. Non labored breathing.  No cough.   Cardiovascular:   No evidence of elevated jugular venous pressure.  1+edema bilaterally, wearing compression socks   Abdomen:  no evidence of abdominal distention. No observe juandice.     Extremities: no cyanosis or clubbing noted.    Skin: no xanthelasma, normal skin color. No evidence of facial lacerations.      Neurologic: Normal arm motion bilateral, no tremors.  No evidence of focal defect.       Psychiatric: alert and oriented x3, calm                                               Medical History  Surgical History Family History Social History   Past Medical History:   Diagnosis Date   ? CAD (coronary artery disease)    ? Esophageal reflux    ? Hyperlipidemia    ? Hypertension     Past Surgical History:   Procedure Laterality Date   ? [place] CABG, VEIN, SINGLE      Description: CABG (CABG);  Proc Date: 01/01/2008;  Comments: LIMA to LAD, SVG to diagonal and OM, radial artery to OM    No family history on file.   Social History     Socioeconomic History   ? Marital status:      Spouse name: Not on file   ? Number of children: Not on file   ? Years of education: Not on file   ? Highest education level: Not on file   Occupational History   ? Not on file   Social Needs   ? Financial resource strain: Not on file   ? Food insecurity     Worry: Not on file     Inability: Not on file   ? Transportation needs     Medical: Not on file     Non-medical: Not on file   Tobacco Use   ? Smoking status: Never Smoker   ? Smokeless tobacco: Never Used   Substance and Sexual Activity   ? Alcohol use: Not on file   ? Drug use: No   ? Sexual activity: Not on file   Lifestyle   ? Physical activity     Days per week: Not on file     Minutes per session: Not on file   ? Stress: Not on file   Relationships   ? Social  connections     Talks on phone: Not on file     Gets together: Not on file     Attends Jehovah's witness service: Not on file     Active member of club or organization: Not on file     Attends meetings of clubs or organizations: Not on file     Relationship status: Not on file   ? Intimate partner violence     Fear of current or ex partner: Not on file     Emotionally abused: Not on file     Physically abused: Not on file     Forced sexual activity: Not on file   Other Topics Concern   ? Not on file   Social History Narrative   ? Not on file          Medications  Allergies   Current Outpatient Medications   Medication Sig Dispense Refill   ? cyanocobalamin (VITAMIN B-12) 1000 MCG tablet Take 1,000 mcg by mouth daily.     ? dabigatran etexilate (PRADAXA) 150 mg capsule Take 1 capsule (150 mg total) by mouth 2 (two) times a day. 180 capsule 2   ? folic acid (FOLVITE) 400 MCG tablet Take 400 mcg by mouth daily.     ? furosemide (LASIX) 40 MG tablet Take 1 tablet (40 mg total) by mouth daily. Patient to take 20mg dose in PM. 270 tablet 1   ? losartan (COZAAR) 50 MG tablet TAKE 1 TABLET(50 MG) BY MOUTH DAILY 90 tablet 1   ? magnesium oxide (MAG-OX) 400 mg (241.3 mg magnesium) tablet TAKE 1 TABLET(400 MG) BY MOUTH DAILY 90 tablet 1   ? multivitamin therapeutic tablet Take 1 tablet by mouth daily.     ? omeprazole (PRILOSEC) 20 MG capsule Take 20 mg by mouth daily.     ? simvastatin (ZOCOR) 40 MG tablet TAKE 1 TABLET BY MOUTH DAILY 90 tablet 2   ? triamcinolone (KENALOG) 0.1 % cream Apply 1 application topically daily as needed.  6   ? VIRTUSSIN AC  mg/5 mL liquid 5-10 mL.  2   ? nitroglycerin (NITROSTAT) 0.4 MG SL tablet PLACE 1 TABLET UNDER THE TONGUE EVERY 5 MINUTES AS NEEDED FOR CHEST PAIN. 3 Bottle 3     No current facility-administered medications for this visit.     Allergies   Allergen Reactions   ? Nuts - Unspecified          Lab Results    Chemistry/lipid CBC Cardiac Enzymes/BNP/TSH/INR   Lab Results   Component  Value Date    CHOL 121 10/23/2019    HDL 50 10/23/2019    LDLCALC 42 10/23/2019    TRIG 146 10/23/2019    CREATININE 1.04 10/23/2019    BUN 15 10/23/2019    K 4.2 10/23/2019     10/23/2019     10/23/2019    CO2 25 10/23/2019    Lab Results   Component Value Date    WBC 7.1 10/25/2016    HGB 14.2 10/25/2016    HCT 41.8 10/25/2016    MCV 97 10/25/2016     10/25/2016    Lab Results   Component Value Date    CKTOTAL 184 11/07/2011    TROPONINI 0.07 11/08/2011     (H) 09/16/2019    TSH 2.69 06/15/2018    INR 1.10 11/05/2011        Lauren Adame

## 2021-06-29 NOTE — PROGRESS NOTES
"Progress Notes by Lauren Adame CNP at 7/8/2020 12:50 PM     Author: Lauren Adame CNP Service: -- Author Type: Nurse Practitioner    Filed: 7/8/2020  1:16 PM Encounter Date: 7/8/2020 Status: Signed    : Lauren Adame CNP (Nurse Practitioner)           The patient has been notified of following:     \"This video visit will be conducted via a call between you and your physician/provider. We have found that certain health care needs can be provided without the need for an in-person physical exam.  This service lets us provide the care you need with a video conversation.  If a prescription is necessary we can send it directly to your pharmacy.  If lab work is needed we can place an order for that and you can then stop by our lab to have the test done at a later time.      Patient has given verbal consent to a Video visit? Yes    HEART CARE VIDEO ENCOUNTER        The patient has chosen to have the visit conducted as a video visit, to reduce risk of exposure given the current status of Coronavirus in our community. This video visit is being conducted via a call between the patient and physician/provider. Health care needs are being provided without a physical exam.     Assessment/Recommendations   Assessment:  1.  Heart failure with preserved ejection fraction: He continues to have fatigue and mild lower extremity edema.  He states his symptoms have been stable.  He denies dyspnea on exertion, orthopnea or PND.  His weight has been stable.  He tries to follow a low-sodium diet.  He had a BNP in June of 159.    2.  Hypertension: He did not obtain his blood pressure today at home.    3.  Persistent atrial fibrillation: He continues Pradaxa for anticoagulation.  He denies palpitations.  Plan:  1.  Continue current medications  2.  Continue daily weights and low-sodium diet  3.  Increase activity  4.  Establish a new PMD      Follow Up Plan:  Dr Martinez in September and in the heart failure clinic " in December  I have reviewed the note as documented.  This accurately captures the substance of my conversation with the patient.    Total time of video between patient and provider was 24 minutes   Start Time: 1242   Stop Time: 1306    Originating Location (pt. Location): HOME    Distant Location (provider location):  [facility]     Mode of Communication:  Video Conference via [email or web address]       History of Present Illness/Subjective    Ashok Garcia is a 79 y.o. male who is being evaluated via a billable video visit and has consented to a video visit. Ashok Garcia has a history of heart failure with preserved ejection fraction, hypertension, CAD, hyperlipidemia, persistent atrial fibrillation.  Status post CABG.  He had a nuclear stress test August 20 19 which showed an ejection fraction of 54% and was negative for inducible myocardial ischemia or infarction.  He travels to [place] for the winter.    Today, he continues to have fatigue and mild edema.  He states symptoms have been stable.  He denies dyspnea on exertion, orthopnea, PND or chest pain.  He wears compression socks daily.    His weight has been stable.  His weight today was 212 pounds.  He follows a low-sodium diet.  He states he has not been doing much activity.      I have reviewed and updated the patient's Past Medical History, Social History, Family History and Medication List.     Physical Examination performed via live video encounter Review of Systems   General Appearance:   no distress, normal body habitus, upright.   ENT/Mouth: membranes moist, no nasal discharge or bleeding gums.  Normal head shape, no evidence of injury or laceration.     EYES:  no scleral icterus, normal conjunctivae   Neck: no evidence of thyromegaly.  Supple   Chest/Lungs:   No audible wheezing equal chest wall expansion. Non labored breathing.  No cough.   Cardiovascular:   No evidence of elevated jugular venous pressure.  Mild edema bilaterally,  compression socks   Abdomen:  no evidence of abdominal distention. No observe juandice.     Extremities: no cyanosis or clubbing noted.    Skin: no xanthelasma, normal skin color. No evidence of facial lacerations.      Neurologic: Normal arm motion bilateral, no tremors.  No evidence of focal defect.       Psychiatric: alert and oriented x3, calm                                               Medical History  Surgical History Family History Social History   Past Medical History:   Diagnosis Date   ? CAD (coronary artery disease)    ? Esophageal reflux    ? Hyperlipidemia    ? Hypertension     Past Surgical History:   Procedure Laterality Date   ? [place] CABG, VEIN, SINGLE      Description: CABG (CABG);  Proc Date: 01/01/2008;  Comments: LIMA to LAD, SVG to diagonal and OM, radial artery to OM    No family history on file.   Social History     Socioeconomic History   ? Marital status:      Spouse name: Not on file   ? Number of children: Not on file   ? Years of education: Not on file   ? Highest education level: Not on file   Occupational History   ? Not on file   Social Needs   ? Financial resource strain: Not on file   ? Food insecurity     Worry: Not on file     Inability: Not on file   ? Transportation needs     Medical: Not on file     Non-medical: Not on file   Tobacco Use   ? Smoking status: Never Smoker   ? Smokeless tobacco: Never Used   Substance and Sexual Activity   ? Alcohol use: Not on file   ? Drug use: No   ? Sexual activity: Not on file   Lifestyle   ? Physical activity     Days per week: Not on file     Minutes per session: Not on file   ? Stress: Not on file   Relationships   ? Social connections     Talks on phone: Not on file     Gets together: Not on file     Attends Latter-day service: Not on file     Active member of club or organization: Not on file     Attends meetings of clubs or organizations: Not on file     Relationship status: Not on file   ? Intimate partner violence     Fear of  current or ex partner: Not on file     Emotionally abused: Not on file     Physically abused: Not on file     Forced sexual activity: Not on file   Other Topics Concern   ? Not on file   Social History Narrative   ? Not on file          Medications  Allergies   Current Outpatient Medications   Medication Sig Dispense Refill   ? cyanocobalamin (VITAMIN B-12) 1000 MCG tablet Take 1,000 mcg by mouth daily.     ? dabigatran etexilate (PRADAXA) 150 mg capsule Take 1 capsule (150 mg total) by mouth 2 (two) times a day. 180 capsule 2   ? folic acid (FOLVITE) 400 MCG tablet Take 400 mcg by mouth daily.     ? furosemide (LASIX) 40 MG tablet Take 1 tablet (40 mg total) by mouth 2 (two) times a day at 9am and 6pm. Pt started 40mg two times a day 5/5/20 270 tablet 1   ? losartan (COZAAR) 50 MG tablet TAKE 1 TABLET(50 MG) BY MOUTH DAILY 90 tablet 1   ? magnesium oxide (MAG-OX) 400 mg (241.3 mg magnesium) tablet TAKE 1 TABLET(400 MG) BY MOUTH DAILY 90 tablet 1   ? multivitamin therapeutic tablet Take 1 tablet by mouth daily.     ? nitroglycerin (NITROSTAT) 0.4 MG SL tablet PLACE 1 TABLET UNDER THE TONGUE EVERY 5 MINUTES AS NEEDED FOR CHEST PAIN. 3 Bottle 3   ? omeprazole (PRILOSEC) 20 MG capsule Take 20 mg by mouth daily.     ? simvastatin (ZOCOR) 40 MG tablet TAKE 1 TABLET BY MOUTH DAILY 90 tablet 2   ? triamcinolone (KENALOG) 0.1 % cream Apply 1 application topically daily as needed.  6   ? VIRTUSSIN AC  mg/5 mL liquid 5-10 mL.  2     No current facility-administered medications for this visit.     Allergies   Allergen Reactions   ? Nuts - Unspecified          Lab Results    Chemistry/lipid CBC Cardiac Enzymes/BNP/TSH/INR   Lab Results   Component Value Date    CHOL 121 10/23/2019    HDL 50 10/23/2019    LDLCALC 42 10/23/2019    TRIG 146 10/23/2019    CREATININE 1.06 06/10/2020    BUN 15 06/10/2020    K 4.3 06/10/2020     (L) 06/10/2020    CL 96 (L) 06/10/2020    CO2 24 06/10/2020    Lab Results   Component Value  Date    WBC 6.8 06/10/2020    HGB 12.6 (L) 06/10/2020    HCT 38.2 (L) 06/10/2020     (H) 06/10/2020     06/10/2020    Lab Results   Component Value Date    CKTOTAL 184 11/07/2011    TROPONINI 0.07 11/08/2011     (H) 06/10/2020    TSH 2.69 06/15/2018    INR 1.10 11/05/2011        Lauren Adame

## 2021-06-30 NOTE — PROGRESS NOTES
"Progress Notes by Gelacio Martinez MD at 1/4/2021 11:10 AM     Author: Gelacio Martinez MD Service: -- Author Type: Physician    Filed: 1/4/2021 12:11 PM Encounter Date: 1/4/2021 Status: Signed    : Gelacio Martinez MD (Physician)           The patient has been notified of following:     \"This video visit will be conducted via a call between you and your physician/provider. We have found that certain health care needs can be provided without the need for an in-person physical exam.  This service lets us provide the care you need with a video conversation.  If a prescription is necessary we can send it directly to your pharmacy.  If lab work is needed we can place an order for that and you can then stop by our lab to have the test done at a later time.      Patient has given verbal consent to a Video visit? Yes    HEART CARE VIDEO ENCOUNTER        The patient has chosen to have the visit conducted as a video visit, to reduce risk of exposure given the current status of Coronavirus in our community. This video visit is being conducted via a call between the patient and physician/provider. Health care needs are being provided without a physical exam.     Assessment/Recommendations   Assessment/Plan:  1.  Heart failure with preserved ejection fraction.  He reports that his weights have been stable and symptoms of shortness of breath have been stable.  He denies chest pain, orthopnea or PND.  He is going to continue with the current combination of medications.  He does have a history of remote bypass surgery and had a nuclear stress test August 2019 that demonstrated no findings of ischemia.    2.  Hypertension.  I did ask him to get a blood pressure cuff so that he can monitor his blood pressure.  3.  Persistent atrial fibrillation with bradycardic tendency.  Holter monitor from August 2019 demonstrated mild bradycardic response.  He has had no syncope or near syncope.  Average heart rate was around 57 bpm.  He " has not been on carvedilol because of this trend towards bradycardia.  He continues on Pradaxa.  I did again reviewed the increased risk of bleeding and to avoid nonsteroidal anti-inflammatory medications.  He tells me he did take Motrin on a few occasions and I encouraged him not to take Motrin but rather to utilize Tylenol.  4.  Dyslipidemia on simvastatin.  He has been on simvastatin historically and has had a good response with an LDL of 42 in October 2019 and normal liver function tests with a total cholesterol of 121 and triglycerides of 146.    Plan.  I have asked him to have some blood work before he leaves for [place] for follow-up on his chemistries and renal function.  Will comment once this is available for review.  He is asked to follow-up in 6 months and to call us with any specific cardiovascular symptoms or concerns.  Follow Up Plan:  6 months  I have reviewed the note as documented.  This accurately captures the substance of my conversation with the patient.    Total time of video between patient and provider was 15 minutes   Start Time:1120  Stop Time:1135    Originating Location (pt. Location): Home    Distant Location (provider location):  [facility]     Mode of Communication:  Video Conference via [email or web address]       History of Present Illness/Subjective    Ashok Garcia is a 80 y.o. male who is being evaluated via a billable video visit and has consented to a video visit. Ashok Garcia has a history of coronary artery disease and heart failure with preserved left ventricular function.  He reports that he has been feeling well.  He reports that his weights have been stable between 215 and 218 pounds.  He reports that he is not had any significant lower extremity edema.  He reports that he is following a lower sodium diet.  He has a remote history of bypass surgery in [facility] a number of years ago.  He returned from [place] a few years ago with increasing  fluid retention which resolved with initiation of furosemide.  He underwent a nuclear stress test at that time and that demonstrated ejection fraction of 54% without perfusion abnormality.  He reports no specific complaints of chest discomfort.      NM Pharmacologic Stress Test   Order# [id number]   Reading physician: Librado Ballard DO  Ordering physician: Gelacio Martinez MD  Study date: 19    Patient Information      Patient Name   Ashok Garcia  MRN   [id number]  Sex   Male   1   1940 (78 y.o.)    EKG Scan       Stress Test Data - Scan on 19 12:55 PM by     Indications      CHF (congestive heart failure) (H)    Conclusion        The pharmacologic nuclear stress test is negative for inducible myocardial ischemia or infarction.    The left ventricular ejection fraction is 54% with abnormal septal motion consistent with postoperative state    When compared to the images of 2017, there has been no significant change.         Echo Complete  Order# [id number]  Reading physician: Chucho Cowan MD Ordering physician: Tyler Gallagher NP Study date: 18   Performing Date Performing Department   2018  CARDIAC TESTING [[id number]]   Patient Information    Patient Name   Ashok Garcia MRN   [id number] Sex   Male  1   1940 (77 y.o.)   Indications    Dyspnea, SOB, Wheezing, Tachypnea; Congestive heart failure; Atrial fibrillation   Dx: A-fib (H) [I48.91 (ICD-10-CM)]; Fatigue [R53.83 (ICD-10-CM)]; SOB (shortness of breath) [R06.02 (ICD-10-CM)]   Summary      Technically difficult study due to body habitus and presence of intrathoracic air.    Normal left ventricular size with moderately impaired systolic function. LVEF is estimated at 40%.    Normal right ventricular systolic function.    No hemodynamically significant valvular abnormalities are identified.    When compared to the previous study dated 10/19/2016, no significant change.         I have reviewed and  updated the patient's Past Medical History, Social History, Family History and Medication List.     Physical Examination performed via live video encounter Review of Systems   General Appearance:   no distress, normal body habitus, upright.   ENT/Mouth: membranes moist, no nasal discharge or bleeding gums.  Normal head shape, no evidence of injury or laceration.     EYES:  no scleral icterus, normal conjunctivae   Neck:   Supple   Chest/Lungs:   No audible wheezing equal chest wall expansion. Non labored breathing.  No cough.   Cardiovascular:      Abdomen:     Extremities:    Skin:    Neurologic: Normal arm motion bilateral, no tremors.  No evidence of focal defect.       Psychiatric: alert and oriented x3, calm                                               Medical History  Surgical History Family History Social History   Past Medical History:   Diagnosis Date   ? CAD (coronary artery disease)    ? Esophageal reflux    ? Hyperlipidemia    ? Hypertension     Past Surgical History:   Procedure Laterality Date   ? [place] CABG, VEIN, SINGLE      Description: CABG (CABG);  Proc Date: 01/01/2008;  Comments: LIMA to LAD, SVG to diagonal and OM, radial artery to OM    No family history on file.   Social History     Socioeconomic History   ? Marital status:      Spouse name: Not on file   ? Number of children: Not on file   ? Years of education: Not on file   ? Highest education level: Not on file   Occupational History   ? Not on file   Social Needs   ? Financial resource strain: Not on file   ? Food insecurity     Worry: Not on file     Inability: Not on file   ? Transportation needs     Medical: Not on file     Non-medical: Not on file   Tobacco Use   ? Smoking status: Never Smoker   ? Smokeless tobacco: Never Used   Substance and Sexual Activity   ? Alcohol use: Not on file   ? Drug use: No   ? Sexual activity: Not on file   Lifestyle   ? Physical activity     Days per week: Not on file     Minutes per session: Not  on file   ? Stress: Not on file   Relationships   ? Social connections     Talks on phone: Not on file     Gets together: Not on file     Attends Pentecostal service: Not on file     Active member of club or organization: Not on file     Attends meetings of clubs or organizations: Not on file     Relationship status: Not on file   ? Intimate partner violence     Fear of current or ex partner: Not on file     Emotionally abused: Not on file     Physically abused: Not on file     Forced sexual activity: Not on file   Other Topics Concern   ? Not on file   Social History Narrative   ? Not on file          Medications  Allergies   Current Outpatient Medications   Medication Sig Dispense Refill   ? cyanocobalamin (VITAMIN B-12) 1000 MCG tablet Take 1,000 mcg by mouth daily.     ? doxylamine (UNISOM) 25 mg tablet Take 25 mg by mouth at bedtime as needed for sleep.     ? folic acid (FOLVITE) 400 MCG tablet Take 400 mcg by mouth daily.     ? furosemide (LASIX) 40 MG tablet Take 1 tablet (40 mg total) by mouth 2 (two) times a day at 9am and 6pm. Pt started 40mg two times a day 5/5/20 180 tablet 1   ? losartan (COZAAR) 50 MG tablet TAKE 1 TABLET BY MOUTH ONCE DAILY 90 tablet 1   ? magnesium oxide (MAG-OX) 400 mg (241.3 mg magnesium) tablet TAKE 1 TABLET BY MOUTH DAILY 90 tablet 1   ? multivitamin therapeutic tablet Take 1 tablet by mouth daily.     ? omeprazole (PRILOSEC) 20 MG capsule Take 20 mg by mouth daily.     ? PRADAXA 150 mg capsule TAKE 1 CAPSULE BY MOUTH TWICE DAILY 180 capsule 2   ? simvastatin (ZOCOR) 40 MG tablet TAKE 1 TABLET BY MOUTH DAILY 90 tablet 2   ? nitroglycerin (NITROSTAT) 0.4 MG SL tablet PLACE 1 TABLET UNDER THE TONGUE EVERY 5 MINUTES AS NEEDED FOR CHEST PAIN. 3 Bottle 3   ? triamcinolone (KENALOG) 0.1 % cream Apply 1 application topically daily as needed.  6   ? VIRTUSSIN AC  mg/5 mL liquid 5-10 mL.  2     No current facility-administered medications for this visit.     Allergies   Allergen  Reactions   ? Nuts - Unspecified          Lab Results    Chemistry/lipid CBC Cardiac Enzymes/BNP/TSH/INR   Lab Results   Component Value Date    CHOL 121 10/23/2019    HDL 50 10/23/2019    LDLCALC 42 10/23/2019    TRIG 146 10/23/2019    CREATININE 1.06 06/10/2020    BUN 15 06/10/2020    K 4.3 06/10/2020     (L) 06/10/2020    CL 96 (L) 06/10/2020    CO2 24 06/10/2020    Lab Results   Component Value Date    WBC 6.8 06/10/2020    HGB 12.6 (L) 06/10/2020    HCT 38.2 (L) 06/10/2020     (H) 06/10/2020     06/10/2020    Lab Results   Component Value Date    CKTOTAL 184 11/07/2011    TROPONINI 0.07 11/08/2011     (H) 06/10/2020    TSH 2.69 06/15/2018    INR 1.10 11/05/2011        Gelacio Martinez

## 2021-07-04 NOTE — LETTER
Letter by Gelacio Martinez MD at      Author: Gelacio Martinez MD Service: -- Author Type: --    Filed:  Encounter Date: 5/28/2021 Status: (Other)         Ashok Garcia  [address] [address]      May 28, 2021      Dear Ashok,    This letter is to remind you that you will be due for your follow up appointment with Dr. Gelacio Martinez in June, 2021 . To help ensure you are in the best health possible, a regular follow-up with your cardiologist is essential.     Please call our Patient Scheduling Line at [phone] to schedule your appointment at your earliest convenience.  If you have recently scheduled an appointment, please disregard this letter.    We look forward to seeing you again. As always, we are available at the number  above for any questions or concerns you may have.      Sincerely,     The Physicians and Staff of [facility]

## 2021-07-06 ENCOUNTER — COMMUNICATION - HEALTHEAST (OUTPATIENT)
Dept: CARDIOLOGY | Facility: CLINIC | Age: 81
End: 2021-07-06

## 2021-07-06 NOTE — TELEPHONE ENCOUNTER
Telephone Encounter by Rivka Pearl at 7/6/2021  4:34 PM     Author: Rivka Pearl Service: -- Author Type: --    Filed: 7/6/2021  4:35 PM Encounter Date: 7/6/2021 Status: Addendum    : Rivka Pearl    Related Notes: Original Note by Rivka Pearl filed at 7/6/2021  4:34 PM       PA APPROVED:    Approval start date: 7/6/2021  Approval end date:  7/6/2022    Pharmacy has been notified of approval and will contact patient when medication is ready for pickup.

## 2021-07-06 NOTE — TELEPHONE ENCOUNTER
Telephone Encounter by Norma Padilla, RN at 7/6/2021  2:02 PM     Author: Norma Padilla, RN Service: -- Author Type: Registered Nurse    Filed: 7/6/2021  2:05 PM Encounter Date: 7/6/2021 Status: Signed    : Norma Padilla RN (Registered Nurse)       Prior Authorization Request  Whos requesting:  Pharmacy  Pharmacy Name and [facility] # [id number]  Medication Name: Pradaxa 150 bid  Insurance Plan: [id number]  Insurance Member ID Number:  [id number]  CoverMyMeds Key: N/A  Informed patient that prior authorizations can take up to 10 business days for response:   Yes  Okay to leave a detailed message: Yes

## 2021-07-06 NOTE — TELEPHONE ENCOUNTER
Telephone Encounter by Rivka Pearl at 7/6/2021  2:05 PM     Author: Rivka Pearl Service: -- Author Type: --    Filed: 7/6/2021  2:42 PM Encounter Date: 7/6/2021 Status: Addendum    : Rivka Pearl    Related Notes: Original Note by Rivka Pearl filed at 7/6/2021  2:29 PM       Central PA team  [phone]  Pool: HE PA MED ([id number])          PA has been initiated.       PA form completed and faxed insurance via Cover My Meds     Key:  [id number]     Medication:  PRADAXA 150MG    Insurance:  [employer] [place]        Response will be received via fax and may take up to 5-10 business days depending on plan

## 2021-07-14 ENCOUNTER — TELEPHONE (OUTPATIENT)
Dept: CARDIOLOGY | Facility: CLINIC | Age: 81
End: 2021-07-14

## 2021-07-14 NOTE — TELEPHONE ENCOUNTER
"Pt had questions about 7/7/21 lab results, specifically the creatinine.  We discussed that it was \"mildly elevated\" which caused him a great amount of concern.  Informed pt normal is 0.70 - 1.30, his level is 1.31.  Pt was concerned furosemide was harming his kidneys and was told previously that his furosemide may need to be adjusted.    Encouraged pt to get enough water, but to continue on furosemide as prescribed because a decrease could cause weight gain, swelling, and sob, and pt already c/o mild ankle swelling.    Pt also reports he has cut back on his wine drinking.  Informed pt that Dr Martinez is out of the office, and when we hear back from him we will call with updates.  Pt verbalized understanding and will await further instruction.  -kenisha  "

## 2021-07-15 ENCOUNTER — TELEPHONE (OUTPATIENT)
Dept: CARDIOLOGY | Facility: CLINIC | Age: 81
End: 2021-07-15

## 2021-07-15 NOTE — TELEPHONE ENCOUNTER
----- Message from Rozina Vanegas sent at 7/14/2021 10:18 AM CDT -----  Referral per MDG.  Patient is scheduled 08/12 with Mary for consult.  Epic list updated.

## 2021-07-20 DIAGNOSIS — I50.32 CHF (CONGESTIVE HEART FAILURE), NYHA CLASS II, CHRONIC, DIASTOLIC (H): ICD-10-CM

## 2021-07-20 RX ORDER — FUROSEMIDE 40 MG
40 TABLET ORAL
Qty: 180 TABLET | Refills: 1 | Status: SHIPPED | OUTPATIENT
Start: 2021-07-20 | End: 2022-01-24

## 2021-08-03 ENCOUNTER — HOSPITAL ENCOUNTER (OUTPATIENT)
Dept: CARDIOLOGY | Facility: HOSPITAL | Age: 81
End: 2021-08-03
Attending: INTERNAL MEDICINE
Payer: MEDICARE

## 2021-08-03 DIAGNOSIS — I25.10 CAD (CORONARY ARTERY DISEASE): ICD-10-CM

## 2021-08-03 DIAGNOSIS — I48.91 A-FIB (H): ICD-10-CM

## 2021-08-03 LAB — LVEF ECHO: NORMAL

## 2021-08-03 PROCEDURE — 93306 TTE W/DOPPLER COMPLETE: CPT | Mod: 26 | Performed by: INTERNAL MEDICINE

## 2021-08-03 PROCEDURE — 93306 TTE W/DOPPLER COMPLETE: CPT

## 2021-08-03 PROCEDURE — 93225 XTRNL ECG REC<48 HRS REC: CPT

## 2021-08-05 ENCOUNTER — TELEPHONE (OUTPATIENT)
Dept: CARDIOLOGY | Facility: CLINIC | Age: 81
End: 2021-08-05

## 2021-08-05 DIAGNOSIS — I48.91 ATRIAL FIBRILLATION (H): ICD-10-CM

## 2021-08-05 PROCEDURE — 93227 XTRNL ECG REC<48 HR R&I: CPT | Performed by: INTERNAL MEDICINE

## 2021-08-05 RX ORDER — DABIGATRAN ETEXILATE 150 MG/1
150 CAPSULE ORAL 2 TIMES DAILY
Qty: 180 CAPSULE | Refills: 2 | Status: SHIPPED | OUTPATIENT
Start: 2021-08-05 | End: 2022-05-09

## 2021-08-05 NOTE — TELEPHONE ENCOUNTER
----- Message from Kelly Oliver RN sent at 8/5/2021  2:26 PM CDT -----  Regarding: appt with Mary  Pt would like to cancel his LAAO consult with Mary on 8/12/21.  He is still undecided about Watchman, and he has a lot going on right now.  -kenisha

## 2021-08-25 DIAGNOSIS — I25.10 CORONARY ATHEROSCLEROSIS: ICD-10-CM

## 2021-08-25 RX ORDER — LOSARTAN POTASSIUM 50 MG/1
50 TABLET ORAL DAILY
Qty: 90 TABLET | Refills: 1 | Status: SHIPPED | OUTPATIENT
Start: 2021-08-25 | End: 2022-03-02

## 2021-08-26 ENCOUNTER — LAB REQUISITION (OUTPATIENT)
Dept: LAB | Facility: CLINIC | Age: 81
End: 2021-08-26
Payer: MEDICARE

## 2021-08-26 DIAGNOSIS — E78.2 MIXED HYPERLIPIDEMIA: ICD-10-CM

## 2021-08-26 DIAGNOSIS — I10 ESSENTIAL (PRIMARY) HYPERTENSION: ICD-10-CM

## 2021-08-26 LAB
ANION GAP SERPL CALCULATED.3IONS-SCNC: 13 MMOL/L (ref 5–18)
BUN SERPL-MCNC: 19 MG/DL (ref 8–28)
CALCIUM SERPL-MCNC: 9.6 MG/DL (ref 8.5–10.5)
CHLORIDE BLD-SCNC: 100 MMOL/L (ref 98–107)
CHOLEST SERPL-MCNC: 113 MG/DL
CO2 SERPL-SCNC: 24 MMOL/L (ref 22–31)
CREAT SERPL-MCNC: 0.94 MG/DL (ref 0.7–1.3)
GFR SERPL CREATININE-BSD FRML MDRD: 75 ML/MIN/1.73M2
GLUCOSE BLD-MCNC: 79 MG/DL (ref 70–125)
HDLC SERPL-MCNC: 48 MG/DL
LDLC SERPL CALC-MCNC: 45 MG/DL
POTASSIUM BLD-SCNC: 4.3 MMOL/L (ref 3.5–5)
SODIUM SERPL-SCNC: 137 MMOL/L (ref 136–145)
TRIGL SERPL-MCNC: 100 MG/DL

## 2021-08-26 PROCEDURE — 80048 BASIC METABOLIC PNL TOTAL CA: CPT | Mod: ORL | Performed by: FAMILY MEDICINE

## 2021-08-26 PROCEDURE — 80061 LIPID PANEL: CPT | Mod: ORL | Performed by: FAMILY MEDICINE

## 2021-09-16 ENCOUNTER — HOSPITAL ENCOUNTER (INPATIENT)
Facility: HOSPITAL | Age: 81
LOS: 8 days | Discharge: HOME HEALTH | DRG: 854 | End: 2021-09-24
Attending: EMERGENCY MEDICINE | Admitting: EMERGENCY MEDICINE
Payer: MEDICARE

## 2021-09-16 ENCOUNTER — APPOINTMENT (OUTPATIENT)
Dept: RADIOLOGY | Facility: HOSPITAL | Age: 81
DRG: 854 | End: 2021-09-16
Attending: EMERGENCY MEDICINE
Payer: MEDICARE

## 2021-09-16 ENCOUNTER — APPOINTMENT (OUTPATIENT)
Dept: CT IMAGING | Facility: HOSPITAL | Age: 81
DRG: 854 | End: 2021-09-16
Attending: EMERGENCY MEDICINE
Payer: MEDICARE

## 2021-09-16 ENCOUNTER — APPOINTMENT (OUTPATIENT)
Dept: ULTRASOUND IMAGING | Facility: HOSPITAL | Age: 81
DRG: 854 | End: 2021-09-16
Attending: EMERGENCY MEDICINE
Payer: MEDICARE

## 2021-09-16 DIAGNOSIS — K80.10 CALCULUS OF GALLBLADDER WITH CHRONIC CHOLECYSTITIS WITHOUT OBSTRUCTION: Primary | ICD-10-CM

## 2021-09-16 DIAGNOSIS — A41.9 SEPSIS, DUE TO UNSPECIFIED ORGANISM, UNSPECIFIED WHETHER ACUTE ORGAN DYSFUNCTION PRESENT (H): ICD-10-CM

## 2021-09-16 DIAGNOSIS — K81.0 ACUTE CHOLECYSTITIS: ICD-10-CM

## 2021-09-16 LAB
ALBUMIN SERPL-MCNC: 3.1 G/DL (ref 3.5–5)
ALP SERPL-CCNC: 328 U/L (ref 45–120)
ALT SERPL W P-5'-P-CCNC: 82 U/L (ref 0–45)
ANION GAP SERPL CALCULATED.3IONS-SCNC: 13 MMOL/L (ref 5–18)
AST SERPL W P-5'-P-CCNC: 90 U/L (ref 0–40)
BASE EXCESS BLDV CALC-SCNC: 1.3 MMOL/L
BASOPHILS # BLD AUTO: 0 10E3/UL (ref 0–0.2)
BASOPHILS NFR BLD AUTO: 0 %
BILIRUB SERPL-MCNC: 6.6 MG/DL (ref 0–1)
BUN SERPL-MCNC: 17 MG/DL (ref 8–28)
C REACTIVE PROTEIN LHE: 22.6 MG/DL (ref 0–0.8)
CALCIUM SERPL-MCNC: 9.4 MG/DL (ref 8.5–10.5)
CHLORIDE BLD-SCNC: 94 MMOL/L (ref 98–107)
CO2 SERPL-SCNC: 22 MMOL/L (ref 22–31)
CREAT SERPL-MCNC: 1.1 MG/DL (ref 0.7–1.3)
EOSINOPHIL # BLD AUTO: 0 10E3/UL (ref 0–0.7)
EOSINOPHIL NFR BLD AUTO: 0 %
ERYTHROCYTE [DISTWIDTH] IN BLOOD BY AUTOMATED COUNT: 12.8 % (ref 10–15)
GFR SERPL CREATININE-BSD FRML MDRD: 62 ML/MIN/1.73M2
GLUCOSE BLD-MCNC: 133 MG/DL (ref 70–125)
HCO3 BLDV-SCNC: 25 MMOL/L (ref 24–30)
HCT VFR BLD AUTO: 36.2 % (ref 40–53)
HGB BLD-MCNC: 12.5 G/DL (ref 13.3–17.7)
IMM GRANULOCYTES # BLD: 0.1 10E3/UL
IMM GRANULOCYTES NFR BLD: 1 %
LACTATE SERPL-SCNC: 0.8 MMOL/L (ref 0.7–2)
LACTATE SERPL-SCNC: 3.3 MMOL/L (ref 0.7–2)
LYMPHOCYTES # BLD AUTO: 0.4 10E3/UL (ref 0.8–5.3)
LYMPHOCYTES NFR BLD AUTO: 3 %
MCH RBC QN AUTO: 34.1 PG (ref 26.5–33)
MCHC RBC AUTO-ENTMCNC: 34.5 G/DL (ref 31.5–36.5)
MCV RBC AUTO: 99 FL (ref 78–100)
MONOCYTES # BLD AUTO: 0.8 10E3/UL (ref 0–1.3)
MONOCYTES NFR BLD AUTO: 5 %
NEUTROPHILS # BLD AUTO: 14.3 10E3/UL (ref 1.6–8.3)
NEUTROPHILS NFR BLD AUTO: 91 %
NRBC # BLD AUTO: 0 10E3/UL
NRBC BLD AUTO-RTO: 0 /100
OXYHGB MFR BLDV: 52.8 % (ref 70–75)
PCO2 BLDV: 36 MM HG (ref 35–50)
PH BLDV: 7.46 [PH] (ref 7.35–7.45)
PLATELET # BLD AUTO: 173 10E3/UL (ref 150–450)
PO2 BLDV: 28 MM HG (ref 25–47)
POTASSIUM BLD-SCNC: 3.8 MMOL/L (ref 3.5–5)
PROCALCITONIN SERPL-MCNC: 2.19 NG/ML (ref 0–0.49)
PROT SERPL-MCNC: 7.4 G/DL (ref 6–8)
RBC # BLD AUTO: 3.67 10E6/UL (ref 4.4–5.9)
SAO2 % BLDV: 54.1 % (ref 70–75)
SARS-COV-2 RNA RESP QL NAA+PROBE: NEGATIVE
SODIUM SERPL-SCNC: 129 MMOL/L (ref 136–145)
TROPONIN I SERPL-MCNC: 0.02 NG/ML (ref 0–0.29)
WBC # BLD AUTO: 15.5 10E3/UL (ref 4–11)

## 2021-09-16 PROCEDURE — 36415 COLL VENOUS BLD VENIPUNCTURE: CPT | Performed by: EMERGENCY MEDICINE

## 2021-09-16 PROCEDURE — 71275 CT ANGIOGRAPHY CHEST: CPT

## 2021-09-16 PROCEDURE — 70450 CT HEAD/BRAIN W/O DYE: CPT

## 2021-09-16 PROCEDURE — 96366 THER/PROPH/DIAG IV INF ADDON: CPT

## 2021-09-16 PROCEDURE — 99223 1ST HOSP IP/OBS HIGH 75: CPT | Performed by: EMERGENCY MEDICINE

## 2021-09-16 PROCEDURE — 250N000011 HC RX IP 250 OP 636: Performed by: EMERGENCY MEDICINE

## 2021-09-16 PROCEDURE — 120N000001 HC R&B MED SURG/OB

## 2021-09-16 PROCEDURE — 96361 HYDRATE IV INFUSION ADD-ON: CPT

## 2021-09-16 PROCEDURE — 74177 CT ABD & PELVIS W/CONTRAST: CPT

## 2021-09-16 PROCEDURE — 86141 C-REACTIVE PROTEIN HS: CPT | Performed by: EMERGENCY MEDICINE

## 2021-09-16 PROCEDURE — 71045 X-RAY EXAM CHEST 1 VIEW: CPT

## 2021-09-16 PROCEDURE — 83605 ASSAY OF LACTIC ACID: CPT | Performed by: EMERGENCY MEDICINE

## 2021-09-16 PROCEDURE — 258N000003 HC RX IP 258 OP 636: Performed by: EMERGENCY MEDICINE

## 2021-09-16 PROCEDURE — 80053 COMPREHEN METABOLIC PANEL: CPT | Performed by: EMERGENCY MEDICINE

## 2021-09-16 PROCEDURE — 84145 PROCALCITONIN (PCT): CPT | Performed by: EMERGENCY MEDICINE

## 2021-09-16 PROCEDURE — 96365 THER/PROPH/DIAG IV INF INIT: CPT | Mod: 59

## 2021-09-16 PROCEDURE — 85025 COMPLETE CBC W/AUTO DIFF WBC: CPT | Performed by: EMERGENCY MEDICINE

## 2021-09-16 PROCEDURE — 87040 BLOOD CULTURE FOR BACTERIA: CPT | Performed by: EMERGENCY MEDICINE

## 2021-09-16 PROCEDURE — 99285 EMERGENCY DEPT VISIT HI MDM: CPT | Mod: 25

## 2021-09-16 PROCEDURE — 96367 TX/PROPH/DG ADDL SEQ IV INF: CPT

## 2021-09-16 PROCEDURE — 82805 BLOOD GASES W/O2 SATURATION: CPT | Performed by: EMERGENCY MEDICINE

## 2021-09-16 PROCEDURE — C9803 HOPD COVID-19 SPEC COLLECT: HCPCS

## 2021-09-16 PROCEDURE — 87635 SARS-COV-2 COVID-19 AMP PRB: CPT | Performed by: EMERGENCY MEDICINE

## 2021-09-16 PROCEDURE — 84484 ASSAY OF TROPONIN QUANT: CPT | Performed by: EMERGENCY MEDICINE

## 2021-09-16 PROCEDURE — 250N000013 HC RX MED GY IP 250 OP 250 PS 637: Performed by: EMERGENCY MEDICINE

## 2021-09-16 PROCEDURE — 76705 ECHO EXAM OF ABDOMEN: CPT

## 2021-09-16 RX ORDER — PIPERACILLIN SODIUM, TAZOBACTAM SODIUM 3; .375 G/15ML; G/15ML
3.38 INJECTION, POWDER, LYOPHILIZED, FOR SOLUTION INTRAVENOUS EVERY 8 HOURS
Status: DISCONTINUED | OUTPATIENT
Start: 2021-09-16 | End: 2021-09-24 | Stop reason: HOSPADM

## 2021-09-16 RX ORDER — PIPERACILLIN SODIUM, TAZOBACTAM SODIUM 3; .375 G/15ML; G/15ML
3.38 INJECTION, POWDER, LYOPHILIZED, FOR SOLUTION INTRAVENOUS ONCE
Status: COMPLETED | OUTPATIENT
Start: 2021-09-16 | End: 2021-09-16

## 2021-09-16 RX ORDER — PIPERACILLIN SODIUM, TAZOBACTAM SODIUM 3; .375 G/15ML; G/15ML
3.38 INJECTION, POWDER, LYOPHILIZED, FOR SOLUTION INTRAVENOUS ONCE
Status: DISCONTINUED | OUTPATIENT
Start: 2021-09-16 | End: 2021-09-16

## 2021-09-16 RX ORDER — IOPAMIDOL 755 MG/ML
100 INJECTION, SOLUTION INTRAVASCULAR ONCE
Status: COMPLETED | OUTPATIENT
Start: 2021-09-16 | End: 2021-09-16

## 2021-09-16 RX ORDER — CEFAZOLIN SODIUM 1 G/50ML
2500 SOLUTION INTRAVENOUS ONCE
Status: COMPLETED | OUTPATIENT
Start: 2021-09-16 | End: 2021-09-16

## 2021-09-16 RX ORDER — NITROGLYCERIN 0.4 MG/1
0.4 TABLET SUBLINGUAL EVERY 5 MIN PRN
Status: DISCONTINUED | OUTPATIENT
Start: 2021-09-16 | End: 2021-09-24 | Stop reason: HOSPADM

## 2021-09-16 RX ORDER — ONDANSETRON 2 MG/ML
4 INJECTION INTRAMUSCULAR; INTRAVENOUS ONCE
Status: DISCONTINUED | OUTPATIENT
Start: 2021-09-16 | End: 2021-09-17

## 2021-09-16 RX ORDER — ACETAMINOPHEN 325 MG/1
975 TABLET ORAL ONCE
Status: COMPLETED | OUTPATIENT
Start: 2021-09-16 | End: 2021-09-16

## 2021-09-16 RX ORDER — SODIUM CHLORIDE 9 MG/ML
INJECTION, SOLUTION INTRAVENOUS CONTINUOUS
Status: DISCONTINUED | OUTPATIENT
Start: 2021-09-16 | End: 2021-09-17

## 2021-09-16 RX ADMIN — IOPAMIDOL 100 ML: 755 INJECTION, SOLUTION INTRAVENOUS at 17:12

## 2021-09-16 RX ADMIN — VANCOMYCIN HYDROCHLORIDE 2500 MG: 1 INJECTION, POWDER, LYOPHILIZED, FOR SOLUTION INTRAVENOUS at 17:31

## 2021-09-16 RX ADMIN — SODIUM CHLORIDE: 9 INJECTION, SOLUTION INTRAVENOUS at 20:28

## 2021-09-16 RX ADMIN — ACETAMINOPHEN 975 MG: 325 TABLET ORAL at 15:29

## 2021-09-16 RX ADMIN — SODIUM CHLORIDE 1000 ML: 9 INJECTION, SOLUTION INTRAVENOUS at 15:42

## 2021-09-16 RX ADMIN — SODIUM CHLORIDE 1000 ML: 9 INJECTION, SOLUTION INTRAVENOUS at 16:47

## 2021-09-16 RX ADMIN — PIPERACILLIN SODIUM AND TAZOBACTAM SODIUM 3.38 G: 3; .375 INJECTION, POWDER, LYOPHILIZED, FOR SOLUTION INTRAVENOUS at 16:48

## 2021-09-16 ASSESSMENT — ENCOUNTER SYMPTOMS
CONFUSION: 0
HEMATURIA: 0
SORE THROAT: 0
BACK PAIN: 0
DIARRHEA: 0
NAUSEA: 1
DYSURIA: 0
COUGH: 0
DIZZINESS: 0
NECK PAIN: 0
JOINT SWELLING: 0
FEVER: 0
SHORTNESS OF BREATH: 0
VOMITING: 1
WOUND: 1
ABDOMINAL PAIN: 1
FATIGUE: 1
CHILLS: 0
NUMBNESS: 0

## 2021-09-16 ASSESSMENT — MIFFLIN-ST. JEOR: SCORE: 1754.06

## 2021-09-16 ASSESSMENT — ACTIVITIES OF DAILY LIVING (ADL): DEPENDENT_IADLS:: INDEPENDENT;TRANSPORTATION

## 2021-09-16 NOTE — ED NOTES
Bed: JNED-22  Expected date: 9/16/21  Expected time: 2:42 PM  Means of arrival: Ambulance  Comments:  Allvivian  77yo  Fall thinners

## 2021-09-16 NOTE — PHARMACY-VANCOMYCIN DOSING SERVICE
Pharmacy Vancomycin Initial Note  Date of Service 2021  Patient's  1940  80 year old, male    Indication: Sepsis    Current estimated CrCl = CrCl cannot be calculated (Unknown ideal weight.).    Creatinine for last 3 days  2021:  3:17 PM Creatinine 1.10 mg/dL    Recent Vancomycin Level(s) for last 3 days  No results found for requested labs within last 72 hours.      Vancomycin IV Administrations (past 72 hours)      No vancomycin orders with administrations in past 72 hours.                Nephrotoxins and other renal medications (From now, onward)    Start     Dose/Rate Route Frequency Ordered Stop    21 1630  piperacillin-tazobactam (ZOSYN) 3.375 g vial to attach to  mL bag      3.375 g  over 30 Minutes Intravenous ONCE 21 1615      21 1630  vancomycin (VANCOCIN) 2,500 mg in sodium chloride 0.9 % 500 mL intermittent infusion      2,500 mg  over 2 Hours Intravenous ONCE 21 1624            Contrast Orders - past 72 hours (72h ago, onward)    None            Plan:  1. Administer vancomycin  2500 mg IV once.     Sharda Lambert, [facility]

## 2021-09-16 NOTE — ED PROVIDER NOTES
Emergency Department Encounter     Evaluation Date & Time:   9/16/2021  2:59 PM    CHIEF COMPLAINT:  Fall and Fever      Triage Note: Pt arrives via EMS from home. Pt was attempting to get off toilet today and slipped and fell. Pt struck right elbow on counter as he fell. Pt is on plavix. Did not hit head. Skin tear to right elbow. Pt has been running a fever at home. Febrile here.        ED COURSE & MEDICAL DECISION MAKING:     ED Course as of Sep 16 1922   Thu Sep 16, 2021   1748 Covid negative.      1758 LFTs and bili acutely elevated.  CT showing likely cholecystitis, obstruction.  Pt started on antibiotics.  Will get US.      1920 I spoke with hospitalist, Dr. Nogueira, who accepts for hospitalization.  Pt aware of results/plan.            3:02 PM I met with the patient for the initial interview and physical examination. Discussed plan for treatment and workup in the ED. PPE: Provider wore eye protection, face shield, N95 & surgical mask and gloves.  Pt presenting from home for fatigue/weakness past few days, slipped off toilet today resulting in minor right elbow skin tear, but no head injury.  He is on Pradaxa for Afib, so CT head will be ordered.  Found to be febrile, a little hypoxic to 89%.  Pt is fully vaccinated for covid-19 and denies any respiratory symptoms including cough, cp/sob.  Pt did have an episode of emesis and endorses some abdominal discomfort. Abdomen largely benign, but given age/fever, will need CT imaging.  I've ordered sepsis labs, covid testing and plan for hospitalization.      6:30 PM I discussed the case with Dr. Saleh, [facility]. Recommends pt remain NPO after midnight.  Agrees with IVF, antibx.  Pt should have pradax held.    At the conclusion of the encounter I discussed the results of all the tests and the disposition. The questions were answered. The patient or family acknowledged understanding and was agreeable with the care plan.      MEDICATIONS GIVEN IN THE EMERGENCY  DEPARTMENT:  Medications   ondansetron (ZOFRAN) injection 4 mg (0 mg Intravenous Hold 9/16/21 1601)   0.9% sodium chloride BOLUS (0 mLs Intravenous Stopped 9/16/21 1857)     Followed by   sodium chloride 0.9% infusion (has no administration in time range)   vancomycin (VANCOCIN) 2,500 mg in sodium chloride 0.9 % 500 mL intermittent infusion (2,500 mg Intravenous New Bag 9/16/21 1731)   0.9% sodium chloride BOLUS (0 mLs Intravenous Stopped 9/16/21 1648)   acetaminophen (TYLENOL) tablet 975 mg (975 mg Oral Given 9/16/21 1529)   piperacillin-tazobactam (ZOSYN) 3.375 g vial to attach to  mL bag (0 g Intravenous Stopped 9/16/21 1730)   iopamidol (ISOVUE-370) solution 100 mL (100 mLs Intravenous Given 9/16/21 1712)       NEW PRESCRIPTIONS STARTED AT TODAY'S ED VISIT:  New Prescriptions    No medications on file       HPI   HPI     Ashok Garcia is a 80 year old male with a pertinent history of hypertension, hyperlipidemia, CAD s/p CABG, and atrial fibrillation who presents to this ED by ambulance for evaluation after a fall.     Patient arrives to the ED from home where he lives alone independently, but the patient's son and daughter-in-law were there today. While using the restroom, the patient slipped off the toilet and sustained a skin tear to his right elbow. No head injury or loss of consciousness. Patient also reports feeling generally weak and fatigued for the past 2 days. This morning he had some abdominal discomfort and nausea with 1 episode of vomiting. He denies loss of taste or smell, sore throat, cough, chest pain, or shortness of breath. Since the fall he denies new neck pain, new back pain, numbness, focal weakness, or ongoing nausea. Patient is fully-vaccinated against COVID-19. No known sick contacts.         Per chart review, patient had an echocardiogram on 8/3/2021. Interpretation Summary: The left ventricle is normal in size with mild concentric left ventricular hypertrophy. Left  ventricular function is normal.The ejection fraction is 55-60%. Normal right ventricle size and systolic function. Sclerodegenerative valve disease is present without hemodynamically significant stenosis or regurgitation.        REVIEW OF SYSTEMS:  Review of Systems   Constitutional: Positive for fatigue. Negative for chills and fever.   HENT: Negative for sore throat.    Eyes: Negative for visual disturbance.   Respiratory: Negative for cough and shortness of breath.    Cardiovascular: Negative for chest pain.   Gastrointestinal: Positive for abdominal pain, nausea (resolved) and vomiting. Negative for diarrhea.   Endocrine: Negative for polyuria.   Genitourinary: Negative for dysuria and hematuria.        - urinary changes   Musculoskeletal: Negative for back pain, joint swelling and neck pain.   Skin: Positive for wound (skin tear to right elbow). Negative for rash.   Neurological: Negative for dizziness and numbness.        Negative for loss of consciousness   Negative for loss of taste or smell   Psychiatric/Behavioral: Negative for confusion.   All other systems reviewed and are negative.      Medical History     Past Medical History:   Diagnosis Date     CAD (coronary artery disease)      CAD (coronary artery disease)      Esophageal reflux      Esophageal reflux      Hyperlipidemia      Hyperlipidemia      Hypertension      Hypertension        Past Surgical History:   Procedure Laterality Date     C CABG, VEIN, SINGLE      Description: CABG (CABG);  Proc Date: 01/01/2008;  Comments: LIMA to LAD, SVG to diagonal and OM, radial artery to OM       History reviewed. No pertinent family history.    Social History     Tobacco Use     Smoking status: Never Smoker     Smokeless tobacco: Never Used   Substance Use Topics     Alcohol use: None     Drug use: No       cyanocobalamin (VITAMIN B-12) 1000 MCG tablet  dabigatran ANTICOAGULANT (PRADAXA ANTICOAGULANT) 150 MG capsule  doxylamine (UNISOM) 25 mg tablet  folic  acid (FOLVITE) 400 MCG tablet  furosemide (LASIX) 40 MG tablet  losartan (COZAAR) 50 MG tablet  magnesium oxide (MAG-OX) 400 mg (241.3 mg magnesium) tablet  multivitamin therapeutic tablet  nitroglycerin (NITROSTAT) 0.4 MG SL tablet  omeprazole (PRILOSEC) 20 MG capsule  simvastatin (ZOCOR) 40 MG tablet  VIRTUSSIN AC  mg/5 mL liquid        Physical Exam     Triage Vitals:  ED Triage Vitals   Enc Vitals Group      BP       Pulse       Resp       Temp       Temp src       SpO2       Weight       Height       Head Circumference       Peak Flow       Pain Score       Pain Loc       Pain Edu?       Excl. in GC?         Vitals:  /60   Pulse 77   Temp 99.1  F (37.3  C) (Oral)   Resp 28   Ht 1.829 m (6')   Wt 101.6 kg (224 lb)   SpO2 98%   BMI 30.38 kg/m      PHYSICAL EXAM:   Physical Exam  Vitals and nursing note reviewed.   Constitutional:       General: He is not in acute distress.     Appearance: Normal appearance.      Comments: Warm to the touch   HENT:      Head: Normocephalic and atraumatic.      Nose: Nose normal.      Mouth/Throat:      Mouth: Mucous membranes are moist.   Eyes:      Pupils: Pupils are equal, round, and reactive to light.   Cardiovascular:      Rate and Rhythm: Tachycardia present. Rhythm irregularly irregular.      Pulses: Normal pulses.           Radial pulses are 2+ on the right side and 2+ on the left side.        Dorsalis pedis pulses are 2+ on the right side and 2+ on the left side.   Pulmonary:      Effort: Pulmonary effort is normal. No respiratory distress.      Breath sounds: Normal breath sounds.   Abdominal:      Palpations: Abdomen is soft.      Tenderness: There is no abdominal tenderness.   Musculoskeletal:      Right elbow: Normal range of motion.      Cervical back: Full passive range of motion without pain and neck supple.      Comments: No calf tenderness or swelling b/l   Skin:     General: Skin is warm.      Findings: Wound (superficial skin tear to the right  elbow) present. No rash.   Neurological:      General: No focal deficit present.      Mental Status: He is alert. Mental status is at baseline.      Cranial Nerves: No facial asymmetry.      Comments: Fluent speech, no acute lateralizing deficits   Psychiatric:         Mood and Affect: Mood normal.         Behavior: Behavior normal.         Results     LAB:  All pertinent labs reviewed and interpreted  Labs Ordered and Resulted from Time of ED Arrival Up to the Time of Departure from the ED   COMPREHENSIVE METABOLIC PANEL - Abnormal; Notable for the following components:       Result Value    Sodium 129 (*)     Chloride 94 (*)     Glucose 133 (*)     Alkaline Phosphatase 328 (*)     AST 90 (*)     ALT 82 (*)     Albumin 3.1 (*)     Bilirubin Total 6.6 (*)     All other components within normal limits   CRP INFLAMMATION - Abnormal; Notable for the following components:    CRP 22.6 (*)     All other components within normal limits   BLOOD GAS VENOUS - Abnormal; Notable for the following components:    pH Venous 7.46 (*)     Oxyhemoglobin Venous 52.8 (*)     O2 Sat, Venous 54.1 (*)     All other components within normal limits   LACTIC ACID WHOLE BLOOD - Abnormal; Notable for the following components:    Lactic Acid 3.3 (*)     All other components within normal limits   PROCALCITONIN - Abnormal; Notable for the following components:    Procalcitonin 2.19 (*)     All other components within normal limits   CBC WITH PLATELETS AND DIFFERENTIAL - Abnormal; Notable for the following components:    WBC Count 15.5 (*)     RBC Count 3.67 (*)     Hemoglobin 12.5 (*)     Hematocrit 36.2 (*)     MCH 34.1 (*)     Absolute Neutrophils 14.3 (*)     Absolute Lymphocytes 0.4 (*)     Absolute Immature Granulocytes 0.1 (*)     All other components within normal limits   TROPONIN I - Normal   COVID-19 VIRUS (CORONAVIRUS) BY PCR - Normal    Narrative:     Testing was performed using the breanna  SARS-CoV-2 & Influenza A/B Assay on the  breanna  Cata  System.  This test should be ordered for the detection of SARS-COV-2 in individuals who meet SARS-CoV-2 clinical and/or epidemiological criteria. Test performance is unknown in asymptomatic patients.  This test is for in vitro diagnostic use under the FDA EUA for laboratories certified under CLIA to perform moderate and/or high complexity testing. This test has not been FDA cleared or approved.  A negative test does not rule out the presence of PCR inhibitors in the specimen or target RNA in concentration below the limit of detection for the assay. The possibility of a false negative should be considered if the patient's recent exposure or clinical presentation suggests COVID-19.  [facility] are certified under the Clinical Laboratory Improvement Amendments of 1988 (CLIA-88) as qualified to perform moderate and/or high complexity laboratory testing.   LACTIC ACID WHOLE BLOOD - Normal   ROUTINE UA WITH MICROSCOPIC REFLEX TO CULTURE   PERIPHERAL IV CATHETER   CALL   CALL   BLOOD CULTURE   BLOOD CULTURE   CBC WITH PLATELETS & DIFFERENTIAL    Narrative:     The following orders were created for panel order CBC with platelets differential.  Procedure                               Abnormality         Status                     ---------                               -----------         ------                     CBC with platelets and d...[[id number]]  Abnormal            Final result                 Please view results for these tests on the individual orders.       RADIOLOGY:  CT Abdomen Pelvis w Contrast   Final Result   IMPRESSION:    1.  Cholelithiasis with inflamed appearance of gallbladder suggesting acute cholecystitis. Consider follow-up targeted ultrasound as indicated.   2.  Cirrhotic appearing liver.   3.  Presumed unilateral left gynecomastia, however, left breast malignancy not excluded by this exam..      CT Chest Pulmonary Embolism w Contrast   Final Result   IMPRESSION:    1.  No pulmonary artery embolism or thoracic aortic dissection.   2.  Findings suggesting pulmonary air trapping which can be seen with nonspecific bronchiolitis. No focal pneumonic consolidation or pleural effusion.   3.  Left lower lobe 4 mm pulmonary nodule. This can be followed per guidelines below.   4.  Mild to moderate T12 vertebral body compression fracture, age indeterminate but likely remote assuming no focal thoracic spinal pain.      Head CT w/o contrast   Final Result   IMPRESSION:   1.  No acute intracranial process.      XR Chest Port 1 View   Final Result   IMPRESSION: Previous sternotomy for coronary artery bypass. Heart size and vascularity are normal. Shallow inspiration. Bibasilar linear opacities could represent subsegmental atelectasis versus scar. No pneumothorax nor gross pleural effusion.      Abdomen US, limited (RUQ only)    (Results Pending)              PROCEDURES:  Procedures:  none      FINAL IMPRESSION:    ICD-10-CM    1. Sepsis, due to unspecified organism, unspecified whether acute organ dysfunction present (H)  A41.9    2. Acute cholecystitis  K81.0      CRITICAL CARE  35 minutes of critical care time for sepsis management/treatment.  Time spent interpreting labs/imaging, discussions with pt and consultants, documentation.  This was independent of any procedures.      I, Chrissy Dubon, am serving as a scribe to document services personally performed by Dr. Bobby Quintanilla, based on my observations and the provider's statements to me. I, Bobby Quintanilla, DO attest that Chrissy Dubon is acting in a scribe capacity, has observed my performance of the services and has documented them in accordance with my direction.      Bobby Quintanilla DO  Emergency Medicine  [facility] EMERGENCY DEPARTMENT  9/16/2021  3:00 PM        Bobby Quintanilla MD  09/16/21 1922

## 2021-09-16 NOTE — PHARMACY-ADMISSION MEDICATION HISTORY
Pharmacy Note - Admission Medication History    Pertinent Provider Information: N/A     ______________________________________________________________________    Prior To Admission (PTA) med list completed and updated in EMR.       PTA Med List   Medication Sig Last Dose     cyanocobalamin (VITAMIN B-12) 1000 MCG tablet [CYANOCOBALAMIN (VITAMIN B-12) 1000 MCG TABLET] Take 1,000 mcg by mouth daily. 9/15/2021 at hs     dabigatran ANTICOAGULANT (PRADAXA ANTICOAGULANT) 150 MG capsule Take 1 capsule (150 mg) by mouth 2 times daily Store in original 's bottle or blister pack; use within 120 days of opening. 9/16/2021 at am     doxylamine (UNISOM) 25 mg tablet [DOXYLAMINE (UNISOM) 25 MG TABLET] Take 25 mg by mouth at bedtime as needed for sleep. Past Week at Unknown time     folic acid (FOLVITE) 400 MCG tablet [FOLIC ACID (FOLVITE) 400 MCG TABLET] Take 400 mcg by mouth daily. 9/15/2021 at hs     furosemide (LASIX) 40 MG tablet Take 1 tablet (40 mg) by mouth 2 times daily (Patient taking differently: Take 40 mg by mouth 2 times daily ) 9/16/2021 at am     losartan (COZAAR) 50 MG tablet Take 1 tablet (50 mg) by mouth daily (Patient taking differently: Take 50 mg by mouth At Bedtime ) 9/15/2021 at hs     magnesium oxide (MAG-OX) 400 mg (241.3 mg magnesium) tablet [MAGNESIUM OXIDE (MAG-OX) 400 MG (241.3 MG MAGNESIUM) TABLET] Take 1 tablet (400 mg total) by mouth daily. 9/16/2021 at am     multivitamin therapeutic tablet [MULTIVITAMIN THERAPEUTIC TABLET] Take 1 tablet by mouth daily. 9/16/2021 at am     nitroglycerin (NITROSTAT) 0.4 MG SL tablet [NITROGLYCERIN (NITROSTAT) 0.4 MG SL TABLET] PLACE 1 TABLET UNDER THE TONGUE EVERY 5 MINUTES AS NEEDED FOR CHEST PAIN. Past Week at Unknown time     omeprazole (PRILOSEC) 20 MG capsule [OMEPRAZOLE (PRILOSEC) 20 MG CAPSULE] Take 20 mg by mouth daily. 9/16/2021 at am     simvastatin (ZOCOR) 40 MG tablet [SIMVASTATIN (ZOCOR) 40 MG TABLET] TAKE 1 TABLET BY MOUTH DAILY (Patient  taking differently: Take 40 mg by mouth At Bedtime ) 9/14/2021 at hs     VIRTUSSIN AC  mg/5 mL liquid Take 1 teaspoonful by mouth every 6 hours as needed for cough  Past Month at Unknown time       Information source(s): Patient, Hospital records and [facility]  Method of interview communication: phone    Summary of Changes to PTA Med List  New:   Discontinued: triamcinolone cream  Changed: added directions to virtussin AC    Patient was asked about OTC/herbal products specifically.  PTA med list reflects this.    In the past week, patient estimated taking medication this percent of the time:  greater than 90%.    Allergies were reviewed, assessed, and updated with the patient.      Patient does not use any multi-dose medications prior to admission.

## 2021-09-16 NOTE — ED TRIAGE NOTES
Pt arrives via EMS from home. Pt was attempting to get off toilet today and slipped and fell. Pt struck right elbow on counter as he fell. Pt is on plavix. Did not hit head. Skin tear to right elbow. Pt has been running a fever at home. Febrile here.

## 2021-09-16 NOTE — ED NOTES
Pt presents NAD. SKIN: WNL. Warm to touch. Mildly diaphoretic behind neck. Pt does have redness above his coccyx but no open wounds.   HEENT: Normocephalic, PERRL, alert and oriented x 3.   CHEST: Symmetrical rise. No reported CP or SOB.  ABD: NTND. No reported N&V or diarrhea.  EXT: CHOU x 4  Rest of exam unremarkable.

## 2021-09-16 NOTE — ED NOTES
Called and updated shahid Freeman's son. He will come visit patient in a couple hours. Writer will call and update him with CT results.

## 2021-09-17 LAB
AFP SERPL-MCNC: 2.1 NG/ML
ALBUMIN SERPL-MCNC: 2.6 G/DL (ref 3.5–5)
ALBUMIN UR-MCNC: NEGATIVE MG/DL
ALP SERPL-CCNC: 262 U/L (ref 45–120)
ALT SERPL W P-5'-P-CCNC: 78 U/L (ref 0–45)
ANION GAP SERPL CALCULATED.3IONS-SCNC: 10 MMOL/L (ref 5–18)
APPEARANCE UR: CLEAR
AST SERPL W P-5'-P-CCNC: 93 U/L (ref 0–40)
BILIRUB DIRECT SERPL-MCNC: 4.4 MG/DL
BILIRUB SERPL-MCNC: 7.1 MG/DL (ref 0–1)
BILIRUB UR QL STRIP: ABNORMAL
BUN SERPL-MCNC: 16 MG/DL (ref 8–28)
C REACTIVE PROTEIN LHE: 23.4 MG/DL (ref 0–0.8)
CALCIUM SERPL-MCNC: 8.9 MG/DL (ref 8.5–10.5)
CHLORIDE BLD-SCNC: 105 MMOL/L (ref 98–107)
CO2 SERPL-SCNC: 20 MMOL/L (ref 22–31)
COLOR UR AUTO: YELLOW
CREAT SERPL-MCNC: 0.85 MG/DL (ref 0.7–1.3)
ERYTHROCYTE [DISTWIDTH] IN BLOOD BY AUTOMATED COUNT: 12.7 % (ref 10–15)
FERRITIN SERPL-MCNC: 1199 NG/ML (ref 27–300)
GFR SERPL CREATININE-BSD FRML MDRD: 81 ML/MIN/1.73M2
GLUCOSE BLD-MCNC: 109 MG/DL (ref 70–125)
GLUCOSE UR STRIP-MCNC: NEGATIVE MG/DL
HCT VFR BLD AUTO: 32.6 % (ref 40–53)
HGB BLD-MCNC: 11.1 G/DL (ref 13.3–17.7)
HGB UR QL STRIP: ABNORMAL
INR PPP: 1.53 (ref 0.85–1.15)
IRON SATN MFR SERPL: 22 % (ref 20–50)
IRON SERPL-MCNC: 40 UG/DL (ref 42–175)
IRON SERPL-MCNC: 40 UG/DL (ref 42–175)
KETONES UR STRIP-MCNC: NEGATIVE MG/DL
LEUKOCYTE ESTERASE UR QL STRIP: NEGATIVE
LIPASE SERPL-CCNC: 105 U/L (ref 0–52)
MCH RBC QN AUTO: 34 PG (ref 26.5–33)
MCHC RBC AUTO-ENTMCNC: 34 G/DL (ref 31.5–36.5)
MCV RBC AUTO: 100 FL (ref 78–100)
NITRATE UR QL: NEGATIVE
PH UR STRIP: 6 [PH] (ref 5–7)
PLATELET # BLD AUTO: 153 10E3/UL (ref 150–450)
POTASSIUM BLD-SCNC: 3.6 MMOL/L (ref 3.5–5)
PROT SERPL-MCNC: 6.3 G/DL (ref 6–8)
RBC # BLD AUTO: 3.26 10E6/UL (ref 4.4–5.9)
RBC URINE: 1 /HPF
SODIUM SERPL-SCNC: 135 MMOL/L (ref 136–145)
SP GR UR STRIP: 1.04 (ref 1–1.03)
TIBC SERPL-MCNC: 181 UG/DL (ref 313–563)
TRANSFERRIN SERPL-MCNC: 145 MG/DL (ref 212–360)
UROBILINOGEN UR STRIP-MCNC: <2 MG/DL
WBC # BLD AUTO: 12.2 10E3/UL (ref 4–11)
WBC URINE: 3 /HPF

## 2021-09-17 PROCEDURE — 83540 ASSAY OF IRON: CPT | Performed by: INTERNAL MEDICINE

## 2021-09-17 PROCEDURE — 86141 C-REACTIVE PROTEIN HS: CPT | Performed by: EMERGENCY MEDICINE

## 2021-09-17 PROCEDURE — 82390 ASSAY OF CERULOPLASMIN: CPT | Performed by: INTERNAL MEDICINE

## 2021-09-17 PROCEDURE — 36415 COLL VENOUS BLD VENIPUNCTURE: CPT | Performed by: INTERNAL MEDICINE

## 2021-09-17 PROCEDURE — 250N000013 HC RX MED GY IP 250 OP 250 PS 637: Performed by: FAMILY MEDICINE

## 2021-09-17 PROCEDURE — 36415 COLL VENOUS BLD VENIPUNCTURE: CPT | Performed by: EMERGENCY MEDICINE

## 2021-09-17 PROCEDURE — 83516 IMMUNOASSAY NONANTIBODY: CPT | Performed by: INTERNAL MEDICINE

## 2021-09-17 PROCEDURE — 99222 1ST HOSP IP/OBS MODERATE 55: CPT | Performed by: SURGERY

## 2021-09-17 PROCEDURE — 82103 ALPHA-1-ANTITRYPSIN TOTAL: CPT | Performed by: INTERNAL MEDICINE

## 2021-09-17 PROCEDURE — 99233 SBSQ HOSP IP/OBS HIGH 50: CPT | Performed by: FAMILY MEDICINE

## 2021-09-17 PROCEDURE — 82105 ALPHA-FETOPROTEIN SERUM: CPT | Performed by: INTERNAL MEDICINE

## 2021-09-17 PROCEDURE — 83690 ASSAY OF LIPASE: CPT | Performed by: PHYSICIAN ASSISTANT

## 2021-09-17 PROCEDURE — 82248 BILIRUBIN DIRECT: CPT | Performed by: PHYSICIAN ASSISTANT

## 2021-09-17 PROCEDURE — 82728 ASSAY OF FERRITIN: CPT | Performed by: INTERNAL MEDICINE

## 2021-09-17 PROCEDURE — 250N000011 HC RX IP 250 OP 636: Performed by: EMERGENCY MEDICINE

## 2021-09-17 PROCEDURE — 80074 ACUTE HEPATITIS PANEL: CPT | Performed by: INTERNAL MEDICINE

## 2021-09-17 PROCEDURE — 85610 PROTHROMBIN TIME: CPT | Performed by: INTERNAL MEDICINE

## 2021-09-17 PROCEDURE — 120N000001 HC R&B MED SURG/OB

## 2021-09-17 PROCEDURE — 258N000003 HC RX IP 258 OP 636: Performed by: EMERGENCY MEDICINE

## 2021-09-17 PROCEDURE — 85014 HEMATOCRIT: CPT | Performed by: EMERGENCY MEDICINE

## 2021-09-17 PROCEDURE — 81001 URINALYSIS AUTO W/SCOPE: CPT | Performed by: EMERGENCY MEDICINE

## 2021-09-17 PROCEDURE — 82040 ASSAY OF SERUM ALBUMIN: CPT | Performed by: EMERGENCY MEDICINE

## 2021-09-17 RX ORDER — CEFAZOLIN SODIUM 2 G/100ML
2 INJECTION, SOLUTION INTRAVENOUS
Status: CANCELLED | OUTPATIENT
Start: 2021-09-17

## 2021-09-17 RX ORDER — MORPHINE SULFATE 4 MG/ML
4 INJECTION, SOLUTION INTRAMUSCULAR; INTRAVENOUS
Status: DISCONTINUED | OUTPATIENT
Start: 2021-09-17 | End: 2021-09-24 | Stop reason: HOSPADM

## 2021-09-17 RX ORDER — LOSARTAN POTASSIUM 50 MG/1
50 TABLET ORAL DAILY
Status: DISCONTINUED | OUTPATIENT
Start: 2021-09-18 | End: 2021-09-24 | Stop reason: HOSPADM

## 2021-09-17 RX ORDER — NALOXONE HYDROCHLORIDE 0.4 MG/ML
0.2 INJECTION, SOLUTION INTRAMUSCULAR; INTRAVENOUS; SUBCUTANEOUS
Status: DISCONTINUED | OUTPATIENT
Start: 2021-09-17 | End: 2021-09-24 | Stop reason: HOSPADM

## 2021-09-17 RX ORDER — NALOXONE HYDROCHLORIDE 0.4 MG/ML
0.4 INJECTION, SOLUTION INTRAMUSCULAR; INTRAVENOUS; SUBCUTANEOUS
Status: DISCONTINUED | OUTPATIENT
Start: 2021-09-17 | End: 2021-09-24 | Stop reason: HOSPADM

## 2021-09-17 RX ORDER — CEFAZOLIN SODIUM 2 G/100ML
2 INJECTION, SOLUTION INTRAVENOUS SEE ADMIN INSTRUCTIONS
Status: CANCELLED | OUTPATIENT
Start: 2021-09-17

## 2021-09-17 RX ORDER — FUROSEMIDE 20 MG
40 TABLET ORAL DAILY
Status: DISCONTINUED | OUTPATIENT
Start: 2021-09-18 | End: 2021-09-24 | Stop reason: HOSPADM

## 2021-09-17 RX ORDER — ONDANSETRON 2 MG/ML
4 INJECTION INTRAMUSCULAR; INTRAVENOUS EVERY 6 HOURS PRN
Status: DISCONTINUED | OUTPATIENT
Start: 2021-09-17 | End: 2021-09-20

## 2021-09-17 RX ORDER — LANOLIN ALCOHOL/MO/W.PET/CERES
400 CREAM (GRAM) TOPICAL DAILY
Status: DISCONTINUED | OUTPATIENT
Start: 2021-09-17 | End: 2021-09-24 | Stop reason: HOSPADM

## 2021-09-17 RX ADMIN — Medication 400 MCG: at 18:58

## 2021-09-17 RX ADMIN — VANCOMYCIN HYDROCHLORIDE 1500 MG: 5 INJECTION, POWDER, LYOPHILIZED, FOR SOLUTION INTRAVENOUS at 16:02

## 2021-09-17 RX ADMIN — SODIUM CHLORIDE: 9 INJECTION, SOLUTION INTRAVENOUS at 12:59

## 2021-09-17 RX ADMIN — PIPERACILLIN AND TAZOBACTAM 3.38 G: 3; .375 INJECTION, POWDER, FOR SOLUTION INTRAVENOUS at 16:02

## 2021-09-17 RX ADMIN — PIPERACILLIN AND TAZOBACTAM 3.38 G: 3; .375 INJECTION, POWDER, FOR SOLUTION INTRAVENOUS at 09:04

## 2021-09-17 RX ADMIN — SODIUM CHLORIDE: 9 INJECTION, SOLUTION INTRAVENOUS at 04:54

## 2021-09-17 RX ADMIN — PIPERACILLIN AND TAZOBACTAM 3.38 G: 3; .375 INJECTION, POWDER, FOR SOLUTION INTRAVENOUS at 00:29

## 2021-09-17 NOTE — UTILIZATION REVIEW
Admission Status; Secondary Review Determination   Under the authority of the Utilization Management Committee, the utilization review process indicated a secondary review on Ashok Garcia. The review outcome is based on review of the medical records, discussions with staff, and applying clinical experience noted on the date of the review.   (x) Inpatient Status Appropriate - This patient's medical care is consistent with medical management for inpatient care and reasonable inpatient medical practice.     RATIONALE FOR DETERMINATION   80 yr old male presented after fall.  Acute cholecystitis/cholelithasis with sepsis noted.  WBC and LFT elevated with lactic 3.3.  IV zosyn/vanco.  Heart failure with preserved EF and CAD stable  Afib on Pradaxa that needs washout time to proceed with surgery.  At this time ongoing IV abx and planning OR after Pradaxa washout.    At the time of admission with the information available to the attending physician more than 2 nights Hospital complex care was anticipated, based on patient risk of adverse outcome if treated as outpatient and complex care required. Inpatient admission is appropriate based on the Medicare guidelines.   The information on this document is developed by the utilization review team in order for the business office to ensure compliance. This only denotes the appropriateness of proper admission status and does not reflect the quality of care rendered.   The definitions of Inpatient Status and Observation Status used in making the determination above are those provided in the CMS Coverage Manual, Chapter 1 and Chapter 6, section 70.4.   Sincerely,   Mary Ann Velazquez MD  Utilization Review  Physician Advisor  [facility]

## 2021-09-17 NOTE — CONSULTS
"  GI CONSULT NOTE      Name: Ashok Garcia  Medical Record #: [id number]  YOB: 1940  Date of Admission: 9/16/2021  Date/Time: 9/17/2021/12:50 PM     CHIEF COMPLAINT: elevated LFTs     HISTORY OF PRESENT ILLNESS: This is a 80 year old year old White patient seen at the request of Dr. Nogueira with a history of afib on Pradaxa, CHF, CAD, HLD, obesity.  He was in his usual state of health until 3 days ago when he developed weakness, nausea/vomiting, and some RUQ abdominal pain.  He fell and bumped his elbow and was brought to the ER by EMS.  He had a fever in the ER yesterday but otherwise has been afebrile.    Was found to have elevated LFTs and denies having a known history of liver disease or cirrhosis.  There is no family history of liver disease.  He does drink wine up to 3 glasses daily or more, and the son says he \"is an alcoholic\" and drinks daily.  He had treatment at [facility] within the past 10 years or so.    Today he feels somewhat better and he has not yet eaten.  He denies a history of IV drug use, no tattoos.    PAST MEDICAL HISTORY:  Past Medical History:   Diagnosis Date     CAD (coronary artery disease)      CAD (coronary artery disease)      Esophageal reflux      Esophageal reflux      Hyperlipidemia      Hyperlipidemia      Hypertension      Hypertension        FAMILY HISTORY:  History reviewed. No pertinent family history.    SOCIAL HISTORY:  Social History     Socioeconomic History     Marital status:      Spouse name: Not on file     Number of children: Not on file     Years of education: Not on file     Highest education level: Not on file   Occupational History     Not on file   Tobacco Use     Smoking status: Never Smoker     Smokeless tobacco: Never Used   Substance and Sexual Activity     Alcohol use: Not on file     Drug use: No     Sexual activity: Not on file   Other Topics Concern     Not on file   Social History Narrative     Not on file     Social " Determinants of Health     Financial Resource Strain:      Difficulty of Paying Living Expenses:    Food Insecurity:      Worried About Running Out of Food in the Last Year:      Ran Out of Food in the Last Year:    Transportation Needs:      Lack of Transportation (Medical):      Lack of Transportation (Non-Medical):    Physical Activity:      Days of Exercise per Week:      Minutes of Exercise per Session:    Stress:      Feeling of Stress :    Social Connections:      Frequency of Communication with Friends and Family:      Frequency of Social Gatherings with Friends and Family:      Attends Orthodoxy Services:      Active Member of Clubs or Organizations:      Attends Club or Organization Meetings:      Marital Status:    Intimate Partner Violence:      Fear of Current or Ex-Partner:      Emotionally Abused:      Physically Abused:      Sexually Abused:        MEDICATIONS PRIOR TO ADMISSION:   Medications Prior to Admission   Medication Sig Dispense Refill Last Dose     cyanocobalamin (VITAMIN B-12) 1000 MCG tablet [CYANOCOBALAMIN (VITAMIN B-12) 1000 MCG TABLET] Take 1,000 mcg by mouth daily.   9/15/2021 at hs     dabigatran ANTICOAGULANT (PRADAXA ANTICOAGULANT) 150 MG capsule Take 1 capsule (150 mg) by mouth 2 times daily Store in original 's bottle or blister pack; use within 120 days of opening. 180 capsule 2 9/16/2021 at am     doxylamine (UNISOM) 25 mg tablet [DOXYLAMINE (UNISOM) 25 MG TABLET] Take 25 mg by mouth at bedtime as needed for sleep.   Past Week at Unknown time     folic acid (FOLVITE) 400 MCG tablet [FOLIC ACID (FOLVITE) 400 MCG TABLET] Take 400 mcg by mouth daily.   9/15/2021 at hs     furosemide (LASIX) 40 MG tablet Take 1 tablet (40 mg) by mouth 2 times daily (Patient taking differently: Take 40 mg by mouth 2 times daily ) 180 tablet 1 9/16/2021 at am     losartan (COZAAR) 50 MG tablet Take 1 tablet (50 mg) by mouth daily (Patient taking differently: Take 50 mg by mouth At Bedtime  ) 90 tablet 1 9/15/2021 at hs     magnesium oxide (MAG-OX) 400 mg (241.3 mg magnesium) tablet [MAGNESIUM OXIDE (MAG-OX) 400 MG (241.3 MG MAGNESIUM) TABLET] Take 1 tablet (400 mg total) by mouth daily. 90 tablet 1 9/16/2021 at am     multivitamin therapeutic tablet [MULTIVITAMIN THERAPEUTIC TABLET] Take 1 tablet by mouth daily.   9/16/2021 at am     nitroglycerin (NITROSTAT) 0.4 MG SL tablet [NITROGLYCERIN (NITROSTAT) 0.4 MG SL TABLET] PLACE 1 TABLET UNDER THE TONGUE EVERY 5 MINUTES AS NEEDED FOR CHEST PAIN. 3 Bottle 3 Past Week at Unknown time     omeprazole (PRILOSEC) 20 MG capsule [OMEPRAZOLE (PRILOSEC) 20 MG CAPSULE] Take 20 mg by mouth daily.   9/16/2021 at am     simvastatin (ZOCOR) 40 MG tablet [SIMVASTATIN (ZOCOR) 40 MG TABLET] TAKE 1 TABLET BY MOUTH DAILY (Patient taking differently: Take 40 mg by mouth At Bedtime ) 90 tablet 2 9/14/2021 at hs     VIRTUSSIN AC  mg/5 mL liquid Take 1 teaspoonful by mouth every 6 hours as needed for cough   2 Past Month at Unknown time          ALLERGIES: Nuts - unspecified [nuts]    REVIEW OF SYSTEMS (ROS): Complete review of systems negative other than listed in HPI.    PHYSICAL EXAM:    /72 (BP Location: Right arm)   Pulse 80   Temp 97.8  F (36.6  C) (Oral)   Resp 16   Ht 1.829 m (6')   Wt 101.6 kg (224 lb)   SpO2 94%   BMI 30.38 kg/m      GENERAL: Pleasant, no obvious distress, son at bedside    SKIN: No jaundice    NECK: Supple without adenopathy    EYES: No scleral icterus    LUNGS: Clear to auscultation bilaterally    HEART: Regular rate and rhythm, S1 and S2 present, no lower extremity edema    ABDOMEN: Soft, non-distended, mildly tender RUQ, no guarding/rebound/mass, bowel sounds normal, no obvious organomegaly    MUSKULOSKELETAL:  Warm and well perfused, moves all extremities well    NEUROLOGIC: Alert and oriented    PSYCHIATRIC: Normal affect    LAB DATA:  Recent Labs   Lab Test 09/17/21  0850 09/17/21  0641 09/16/21  1517 06/10/20  1119   WBC  --   12.2* 15.5* 6.8   HGB  --  11.1* 12.5* 12.6*   MCV  --  100 99 105*   PLT  --  153 173 155   INR 1.53*  --   --   --      Recent Labs   Lab Test 21  0641 21  1517 21  1121   * 129* 137   POTASSIUM 3.6 3.8 4.3   CHLORIDE 105 94* 100   CO2 20* 22 24   BUN 16 17 19   CR 0.85 1.10 0.94   ANIONGAP 10 13 13   SILVIO 8.9 9.4 9.6    133* 79     Recent Labs   Lab Test 21  0850 21  0641 21  0156 21  1517 21  1445   ALBUMIN  --  2.6*  --  3.1* 3.9   BILITOTAL  --  7.1*  --  6.6* 1.9*   ALT  --  78*  --  82* 60*   AST  --  93*  --  90* 71*   ALKPHOS  --  262*  --  328* 140*   PROTEIN  --   --  Negative  --   --    LIPASE 105*  --   --   --   --      Direct bili 4.4 (high), triglycerides 100  Pending: AFP, AMA, iron studies, ceruloplasmin, alpha-1 antitrypsin, viral hepatitis, SMA    IMAGIN2021 CT abdomen/pelvis with IV contrast  FINDINGS:   LOWER CHEST: Mild pleural thickening and calcific plaque present involving right lung base with mild linear fibrosis. Very slight fibrosis at left base. Previous CABG.     HEPATOBILIARY: There is mild gallbladder wall thickening and pericholecystic soft tissue stranding, numerous stones are present. Findings concerning for acute cholecystitis. No bile duct dilatation.  Cirrhotic morphology liver, 4 mm presumed cyst dome of right lobe..     PANCREAS: Normal.     SPLEEN: Normal.     ADRENAL GLANDS: Normal.     KIDNEYS/BLADDER: Subcentimeter cyst right lower pole, kidneys otherwise negative.     BOWEL: No obstruction or inflammatory change.     LYMPH NODES: Normal.     VASCULATURE: Modest atherosclerotic plaque.     PELVIC ORGANS: Normal, no ascites.     MUSCULOSKELETAL: No suspicious bony lesion. Significant asymmetry soft tissue density left chest wall suggests unilateral left gynecomastia.                                                                      IMPRESSION:   1.  Cholelithiasis with inflamed appearance of  gallbladder suggesting acute cholecystitis. Consider follow-up targeted ultrasound as indicated.  2.  Cirrhotic appearing liver.  3.  Presumed unilateral left gynecomastia, however, left breast malignancy not excluded by this exam..    9/16/2021 limited abdominal ultrasound  FINDINGS:     GALLBLADDER: Limited evaluation due to technical factors including intercostal scanning. Exam is also limited due to the patient's body habitus. Cholelithiasis better seen on comparison CT. Mild layering sludge. Mild wall thickening. Mildly positive   sonographic Washington's sign.     BILE DUCTS: No biliary dilatation. The common duct measures 4 mm.     LIVER: Nodular surface contour and diffusely coarsened heterogeneous echotexture. No focal mass.     RIGHT KIDNEY: No hydronephrosis.     PANCREAS: The visualized portions are normal.     No ascites.                                                                      IMPRESSION:  1.  Cholelithiasis better seen on comparison CT and mildly positive sonographic Washington's sign. In conjunction with pericholecystic edema seen on comparison CT, these findings are suspicious for acute cholecystitis. No biliary ductal dilatation.  2.  Cirrhosis.    ASSESSMENT:  1.  Elevated LFTs- suspicious for acute cholecystitis on imaging but with only minimal RUQ pain on exam.  No evidence for ductal dilation on CT and ultrasound.  No clear indication for ERCP at this time but we'll continue to monitor.  Additional liver serologies have been ordered and are pending.  Bilirubin could be elevated related to alcoholic hepatitis with underlying cirrhosis.  He was unaware of cirrhosis diagnosis before now.  MELD-Na is currently calculated at 21 and surgery is somewhat higher risk.  2.  Afib- on Pradaxa before admission but now on hold.      Active Problems:    Acute cholecystitis    Sepsis, due to unspecified organism, unspecified whether acute organ dysfunction present (H)    PLAN:  Discussed with Dr. Lowe  Yang.    1.  Continue antibiotics.  2.  Monitor LFTs.  3.  Follow up on pending liver serologies.  4.  Continue to hold Pradaxa.  5.  GI following.    Jenna Gordillo PA-C  [facility]  [phone]    The patient was seen and evaluated in conjunction with the advanced practice provider. Please see their note for details. History of alcoholism, patient denies current heavy use though son suggests otherwise (see above). +pradaxa. Presents with nausea/vomiting and RUQ pain. Found to have evidence of possible cholecystitis on US (+stones) but no biliary dilation. CT shows cirrhosis- a new diagnosis. No ascites. Patient has normal vitals, A&O, no asterixis. Abd obese, minimal upper abd tenderness, negative Washington sign. Labs include bilirubin 7.1, AST 93, ALT 78, alk phos 262. INR 1.53. Na MELD 21. Impression is new diagnosis of cirrhosis, suspect alcohol though needs complete serologic evaluation. Elevated liver tests likely due to EtOH hepatitis and hepatic decompensation rather than biliary obstruction. MELD 21 portends higher perioperative risk. Findings on imaging to suggest cholecystitis though exam is rather benign. Sometimes acute hepatitis can mimic cholecystitis on imaging, due to hepatic inflammation. Agree with antibiotic therapy for now. Follow for EtOH withdrawal, trend LFTs. Ideally defer cholecystectomy until liver functions improve, to improve surgical outcome- especially since elevation is believed due to hepatitis/cirrhosis rather than biliary issue. Will follow. Alcohol abstinence advised- patient expressed understanding. Serologic evaluation in progress.    Chrissy Soria MD  9/17/20212:53 PM  [facility]

## 2021-09-17 NOTE — PHARMACY-VANCOMYCIN DOSING SERVICE
Pharmacy Vancomycin Initial Note  Date of Service 2021  Patient's  1940  80 year old, male    Indication: Intra-abdominal infection    Current estimated CrCl = Estimated Creatinine Clearance: 63.9 mL/min (based on SCr of 1.1 mg/dL).    Creatinine for last 3 days  2021:  3:17 PM Creatinine 1.10 mg/dL    Recent Vancomycin Level(s) for last 3 days  No results found for requested labs within last 72 hours.      Vancomycin IV Administrations (past 72 hours)                   vancomycin (VANCOCIN) 2,500 mg in sodium chloride 0.9 % 500 mL intermittent infusion (mg) 2,500 mg New Bag 21 1731                Nephrotoxins and other renal medications (From now, onward)    Start     Dose/Rate Route Frequency Ordered Stop    21 1600  vancomycin (VANCOCIN) 1,500 mg in sodium chloride 0.9 % 250 mL intermittent infusion      1,500 mg  over 90 Minutes Intravenous EVERY 24 HOURS 21 2307      21 2300  piperacillin-tazobactam (ZOSYN) 3.375 g vial to attach to  mL bag      3.375 g  over 30 Minutes Intravenous ONCE 21 2244            Contrast Orders - past 72 hours (72h ago, onward)    Start     Dose/Rate Route Frequency Ordered Stop    21 1700  iopamidol (ISOVUE-370) solution 100 mL      100 mL Intravenous ONCE 21 1656 21 1712          Loading dose: 2500 mg at 15:17 2021.  Regimen: 1500 mg IV every 24 hours.  Start time: 16:00 on 2021  Exposure target: AUC24 (range)400-600 mg/L.hr   AUC24,ss: 486 mg/L.hr  Probability of AUC24 > 400: 71 %  Ctrough,ss: 14.6 mg/L  Probability of Ctrough,ss > 20: 25 %  Probability of nephrotoxicity (Lodise JANE ): 10 %          Plan:  1. Start vancomycin  1500 mg IV q24h.   2. Vancomycin monitoring method: AUC  3. Vancomycin therapeutic monitoring goal: 400-600 mg*h/L  4. Pharmacy will check vancomycin levels as appropriate in 1-3 Days.    5. Serum creatinine levels will be ordered a minimum of twice weekly.       Cindy Hager, [facility]

## 2021-09-17 NOTE — PLAN OF CARE
Problem: Risk for Delirium  Goal: Optimal Coping  Outcome: Improving   Patient accepting situation and is cooperative with cares and treatments. Patient denies pain/discomfort. Patient's urine dark lolly and reported having a stool yesterday.

## 2021-09-17 NOTE — PLAN OF CARE
Problem: Adult Inpatient Plan of Care  Goal: Optimal Comfort and Wellbeing  Outcome: Improving     Problem: Risk for Delirium  Goal: Optimal Coping  Outcome: Improving    10:35 PM arrived in the unit [facility] stretcher and admitted to Rm. 220, able to walk and transfer to his bed with AO1. Alert and oriented x 4. NS running at 125 ml/hr and infusing well. External urinary catheter in place with adequate urine output, No complain of pain, Vitals stable.   IV Zosyn started. Urine specimen sent to lab. Seen By the Doctor. Needs attended and made comfortable.

## 2021-09-17 NOTE — PROGRESS NOTES
[facility]    PROGRESS NOTE - Hospitalist Service    Assessment and Plan    80 year old male who presented after a fall and was found to be febrile and subsequently found to have cholecystitis.     1.  Acute cholecystitis with sepsis:  ---sepsis resolved  --- Initial lactate 3.3, normalized after IV fluids.    ---Blood pressure okay.    ---White count and LFTs elevated and abdominal CT showed cholelithiasis with inflamed gallbladder consistent with cholecystitis.    ---GI consulted in the ED. Concerned for hepatitis  ---consult surgery.    ---no on regular diet  ---Continue IV Zosyn  ---will discontinue vancomycin.    Elevated lft; new dx cirrhosis  ---appreciated GI consult; concern for hepatitis, alcohol effect  ---trend lft  ---no bile duct dilation on US  ---holding statin  ---checking hepatits panel     Heart failure with preserved ejection fraction:  --- No evidence of exacerbation today.    ---DV IVF  ---was Holding Lasix and losartan given sepsis.; resume tomorrow    ---  Last echo August 3 showed EF of 55 to 60%.     Persistent atrial fibrillation:   ---On Pradaxa, will hold with upcoming procedure.      Coronary artery disease:  ---- No current evidence of exacerbation.   --- Troponin normal. .     Incidental finding of gynecomastia versus breast mass on imaging.  This can be followed up as outpatient.        Barriers to discharge: Cholecystitis, IV antibiotics    COVID STATUS; negatoive  VTE prophylaxis:  Pneumatic Compression Devices  DIET: Orders Placed This Encounter      Low Saturated Fat Na <2400 mg    Disposition; unclear, pending clinical progression  Barriers to discharge: ; LFT< plan for possible surgery  Code Status: Full Code        Subjective:  Patient seen with son in room.  Denies right upper quadrant tenderness  Thinks he last took his blood thinner yesterday.  When I saw was awaiting GI consultation.  Son in room with questions for GI and surgery.    Past medical  history;   --0-heavy alcohol use,    PHYSICAL EXAM  B/P: 138/72, T: 97.8, P: 80, R: 16     Intake/Output Summary (Last 24 hours) at 9/17/2021 0824  Last data filed at 9/17/2021 0538  Gross per 24 hour   Intake 2718 ml   Output 750 ml   Net 1968 ml      Vitals:    09/16/21 1615   Weight: 101.6 kg (224 lb)       General Appearance: pleasant, NAD  Respiratory: CTA_B  Cardiovascular: Irr irr  GI: soft, more LLQ tenderness thamn LUQ, no masses  Skin: no open areas  Musculoskeletal: trace bilateral edema  Neurologic: grossly intact, no focal deficits apprecioated      PERTINENT LABS/IMAGING:    Recent Results (from the past 24 hour(s))   Lactic acid whole blood    Collection Time: 09/16/21  6:57 PM   Result Value Ref Range    Lactic Acid 0.8 0.7 - 2.0 mmol/L   UA with Microscopic reflex to Culture    Collection Time: 09/17/21  1:56 AM    Specimen: Urine, Clean Catch   Result Value Ref Range    Color Urine Yellow Colorless, Straw, Light Yellow, Yellow    Appearance Urine Clear Clear    Glucose Urine Negative Negative mg/dL    Bilirubin Urine 0.5 mg/dL (A) Negative    Ketones Urine Negative Negative mg/dL    Specific Gravity Urine 1.036 (H) 1.001 - 1.030    Blood Urine 0.1 mg/dL (A) Negative    pH Urine 6.0 5.0 - 7.0    Protein Albumin Urine Negative Negative mg/dL    Urobilinogen Urine <2.0 <2.0 mg/dL    Nitrite Urine Negative Negative    Leukocyte Esterase Urine Negative Negative    RBC Urine 1 <=2 /HPF    WBC Urine 3 <=5 /HPF   CBC with platelets    Collection Time: 09/17/21  6:41 AM   Result Value Ref Range    WBC Count 12.2 (H) 4.0 - 11.0 10e3/uL    RBC Count 3.26 (L) 4.40 - 5.90 10e6/uL    Hemoglobin 11.1 (L) 13.3 - 17.7 g/dL    Hematocrit 32.6 (L) 40.0 - 53.0 %     78 - 100 fL    MCH 34.0 (H) 26.5 - 33.0 pg    MCHC 34.0 31.5 - 36.5 g/dL    RDW 12.7 10.0 - 15.0 %    Platelet Count 153 150 - 450 10e3/uL   Comprehensive metabolic panel    Collection Time: 09/17/21  6:41 AM   Result Value Ref Range    Sodium 135  (L) 136 - 145 mmol/L    Potassium 3.6 3.5 - 5.0 mmol/L    Chloride 105 98 - 107 mmol/L    Carbon Dioxide (CO2) 20 (L) 22 - 31 mmol/L    Anion Gap 10 5 - 18 mmol/L    Urea Nitrogen 16 8 - 28 mg/dL    Creatinine 0.85 0.70 - 1.30 mg/dL    Calcium 8.9 8.5 - 10.5 mg/dL    Glucose 109 70 - 125 mg/dL    Alkaline Phosphatase 262 (H) 45 - 120 U/L    AST 93 (H) 0 - 40 U/L    ALT 78 (H) 0 - 45 U/L    Protein Total 6.3 6.0 - 8.0 g/dL    Albumin 2.6 (L) 3.5 - 5.0 g/dL    Bilirubin Total 7.1 (H) 0.0 - 1.0 mg/dL    GFR Estimate 81 >60 mL/min/1.73m2   CRP inflammation    Collection Time: 09/17/21  6:41 AM   Result Value Ref Range    CRP 23.4 (H) 0.0-<0.8 mg/dL   INR    Collection Time: 09/17/21  8:50 AM   Result Value Ref Range    INR 1.53 (H) 0.85 - 1.15   Lipase    Collection Time: 09/17/21  8:50 AM   Result Value Ref Range    Lipase 105 (H) 0 - 52 U/L   Bilirubin direct    Collection Time: 09/17/21  8:50 AM   Result Value Ref Range    Bilirubin Direct 4.4 (H) <=0.5 mg/dL        Abdomen US, limited (RUQ only)   Final Result   IMPRESSION:   1.  Cholelithiasis better seen on comparison CT and mildly positive sonographic Washington's sign. In conjunction with pericholecystic edema seen on comparison CT, these findings are suspicious for acute cholecystitis. No biliary ductal dilatation.   2.  Cirrhosis.            CT Abdomen Pelvis w Contrast   Final Result   IMPRESSION:    1.  Cholelithiasis with inflamed appearance of gallbladder suggesting acute cholecystitis. Consider follow-up targeted ultrasound as indicated.   2.  Cirrhotic appearing liver.   3.  Presumed unilateral left gynecomastia, however, left breast malignancy not excluded by this exam..      CT Chest Pulmonary Embolism w Contrast   Final Result   IMPRESSION:   1.  No pulmonary artery embolism or thoracic aortic dissection.   2.  Findings suggesting pulmonary air trapping which can be seen with nonspecific bronchiolitis. No focal pneumonic consolidation or pleural effusion.    3.  Left lower lobe 4 mm pulmonary nodule. This can be followed per guidelines below.   4.  Mild to moderate T12 vertebral body compression fracture, age indeterminate but likely remote assuming no focal thoracic spinal pain.      Head CT w/o contrast   Final Result   IMPRESSION:   1.  No acute intracranial process.      XR Chest Port 1 View   Final Result   IMPRESSION: Previous sternotomy for coronary artery bypass. Heart size and vascularity are normal. Shallow inspiration. Bibasilar linear opacities could represent subsegmental atelectasis versus scar. No pneumothorax nor gross pleural effusion.           Cindy Meredith MD  [facility] Medicine Service  [phone]

## 2021-09-17 NOTE — H&P
ADMISSION HISTORY & PHYSICAL      Stef Sims, [phone]  ASSESSMENT AND PLAN:  80 year old male who presented after a fall and was found to be febrile and subsequently found to have cholecystitis.    1.  Acute cholecystitis with sepsis: Initial lactate 3.3, normalized after IV fluids.  Blood pressure okay.  White count and LFTs elevated and abdominal CT showed cholelithiasis with inflamed gallbladder consistent with cholecystitis.  GI consulted in the ED.  N.p.o. after midnight.  We will also consult surgery.  Continue IV Zosyn/vancomycin.    2.  Heart failure with preserved ejection fraction: No evidence of exacerbation today.  Is n.p.o. but will continue meds when able.  Holding Lasix and losartan given sepsis.  Judicious IV fluid administration in the context of sepsis.  Last echo August 3 showed EF of 55 to 60%.    3.  Persistent atrial fibrillation: On Pradaxa, will hold with upcoming procedure.    4.  Coronary artery disease: No current evidence of exacerbation.  Troponin normal.  Will check EKG.    5.  Incidental finding of gynecomastia versus breast mass on imaging.  This can be followed up as outpatient.      Barriers to discharge: Cholecystitis, IV antibiotics      CHIEF COMPLAINT:  Fever/fall    HISTORY OF PRESENTING ILLNESS:  Ashok Garcia is a 80 year old male brought in by EMS after slipping and falling trying to get off the toilet.  He struck his right elbow on a counter.  He did not hit his head, he has a skin tear to his right elbow.  He was noted to have a fever in the ED and he reports running a fever at home.  Currently feels much improved, he feels stronger.  He denies any abdominal pain but does have tenderness in his right upper abdomen.  He denies any chest pain or shortness of breath, no nausea or vomiting.    PMH/PSH:  Patient Active Problem List   Diagnosis     Hyperlipidemia LDL goal <70     Coronary Artery Disease     Obesity     Hypertension     Right bundle  branch block     Esophageal Reflux     Persistent atrial fibrillation (H)     Cardiomyopathy (H)     Chronic diastolic congestive heart failure (H)     Acute cholecystitis     Sepsis, due to unspecified organism, unspecified whether acute organ dysfunction present (H)       ALLERGIES:  Allergies   Allergen Reactions     Nuts - Unspecified [Nuts] Unknown       MEDICATIONS:  Reviewed.  Current Facility-Administered Medications   Medication     ondansetron (ZOFRAN) injection 4 mg     piperacillin-tazobactam (ZOSYN) 3.375 g vial to attach to  mL bag     sodium chloride 0.9% infusion     Current Outpatient Medications   Medication     cyanocobalamin (VITAMIN B-12) 1000 MCG tablet     dabigatran ANTICOAGULANT (PRADAXA ANTICOAGULANT) 150 MG capsule     doxylamine (UNISOM) 25 mg tablet     folic acid (FOLVITE) 400 MCG tablet     furosemide (LASIX) 40 MG tablet     losartan (COZAAR) 50 MG tablet     magnesium oxide (MAG-OX) 400 mg (241.3 mg magnesium) tablet     multivitamin therapeutic tablet     nitroglycerin (NITROSTAT) 0.4 MG SL tablet     omeprazole (PRILOSEC) 20 MG capsule     simvastatin (ZOCOR) 40 MG tablet     VIRTUSSIN AC  mg/5 mL liquid       SOCIAL HISTORY:  Social History     Socioeconomic History     Marital status:      Spouse name: Not on file     Number of children: Not on file     Years of education: Not on file     Highest education level: Not on file   Occupational History     Not on file   Tobacco Use     Smoking status: Never Smoker     Smokeless tobacco: Never Used   Substance and Sexual Activity     Alcohol use: Not on file     Drug use: No     Sexual activity: Not on file   Other Topics Concern     Not on file   Social History Narrative     Not on file     Social Determinants of Health     Financial Resource Strain:      Difficulty of Paying Living Expenses:    Food Insecurity:      Worried About Running Out of Food in the Last Year:      Ran Out of Food in the Last Year:     Transportation Needs:      Lack of Transportation (Medical):      Lack of Transportation (Non-Medical):    Physical Activity:      Days of Exercise per Week:      Minutes of Exercise per Session:    Stress:      Feeling of Stress :    Social Connections:      Frequency of Communication with Friends and Family:      Frequency of Social Gatherings with Friends and Family:      Attends Rastafarian Services:      Active Member of Clubs or Organizations:      Attends Club or Organization Meetings:      Marital Status:    Intimate Partner Violence:      Fear of Current or Ex-Partner:      Emotionally Abused:      Physically Abused:      Sexually Abused:        FAMILY HISTORY:  History reviewed. No pertinent family history.    ROS:  12 point ROS was performed and found to be negative except for the pertinent positives mentioned in the HPI.      PHYSICAL EXAM:  /57   Pulse 72   Temp 98  F (36.7  C) (Oral)   Resp 21   Ht 1.829 m (6')   Wt 101.6 kg (224 lb)   SpO2 94%   BMI 30.38 kg/m    No intake/output data recorded.  I/O this shift:  In: 2100 [IV Piggyback:2100]  Out: -   CONSTITUTIONAL: No apparent distress  HEENT:       Sclera- anicteric      Mucous membrane-dry  LUNGS: Clear to auscultation bilaterally  CARDIOVASCULAR: S1S2 regular. No murmurs, rubs or gallops, bilateral trace edema, stable per patient  GI: Soft. Non-tender, non-distended. No organomegaly. No guarding or rigidity. Bowel sounds- active  NEURO: Cranial nerves II-XII grossly intact. No focal neurological deficit. No involuntary movements  INTGM: No skin rash, no cyanosis or clubbing  LYMPH: no adenopathy  MSK: no joint swelling or tenderness  PSYCH: alert and oriented , no agitation      DIAGNOSTIC DATA:  Recent Results (from the past 24 hour(s))   Comprehensive metabolic panel    Collection Time: 09/16/21  3:17 PM   Result Value Ref Range    Sodium 129 (L) 136 - 145 mmol/L    Potassium 3.8 3.5 - 5.0 mmol/L    Chloride 94 (L) 98 - 107 mmol/L     Carbon Dioxide (CO2) 22 22 - 31 mmol/L    Anion Gap 13 5 - 18 mmol/L    Urea Nitrogen 17 8 - 28 mg/dL    Creatinine 1.10 0.70 - 1.30 mg/dL    Calcium 9.4 8.5 - 10.5 mg/dL    Glucose 133 (H) 70 - 125 mg/dL    Alkaline Phosphatase 328 (H) 45 - 120 U/L    AST 90 (H) 0 - 40 U/L    ALT 82 (H) 0 - 45 U/L    Protein Total 7.4 6.0 - 8.0 g/dL    Albumin 3.1 (L) 3.5 - 5.0 g/dL    Bilirubin Total 6.6 (H) 0.0 - 1.0 mg/dL    GFR Estimate 62 >60 mL/min/1.73m2   Troponin I    Collection Time: 09/16/21  3:17 PM   Result Value Ref Range    Troponin I 0.02 0.00 - 0.29 ng/mL   CRP inflammation    Collection Time: 09/16/21  3:17 PM   Result Value Ref Range    CRP 22.6 (H) 0.0-<0.8 mg/dL   Symptomatic COVID-19 Virus (Coronavirus) by PCR Nasopharyngeal    Collection Time: 09/16/21  3:17 PM    Specimen: Nasopharyngeal; Swab   Result Value Ref Range    SARS CoV2 PCR Negative Negative   Blood gas venous    Collection Time: 09/16/21  3:17 PM   Result Value Ref Range    pH Venous 7.46 (H) 7.35 - 7.45    pCO2 Venous 36 35 - 50 mm Hg    pO2 Venous 28 25 - 47 mm Hg    Bicarbonate Venous 25 24 - 30 mmol/L    Base Excess/Deficit (+/-) 1.3   mmol/L    Oxyhemoglobin Venous 52.8 (L) 70.0 - 75.0 %    O2 Sat, Venous 54.1 (L) 70.0 - 75.0 %   Lactic acid whole blood    Collection Time: 09/16/21  3:17 PM   Result Value Ref Range    Lactic Acid 3.3 (H) 0.7 - 2.0 mmol/L   Procalcitonin    Collection Time: 09/16/21  3:17 PM   Result Value Ref Range    Procalcitonin 2.19 (H) 0.00 - 0.49 ng/mL   CBC with platelets and differential    Collection Time: 09/16/21  3:17 PM   Result Value Ref Range    WBC Count 15.5 (H) 4.0 - 11.0 10e3/uL    RBC Count 3.67 (L) 4.40 - 5.90 10e6/uL    Hemoglobin 12.5 (L) 13.3 - 17.7 g/dL    Hematocrit 36.2 (L) 40.0 - 53.0 %    MCV 99 78 - 100 fL    MCH 34.1 (H) 26.5 - 33.0 pg    MCHC 34.5 31.5 - 36.5 g/dL    RDW 12.8 10.0 - 15.0 %    Platelet Count 173 150 - 450 10e3/uL    % Neutrophils 91 %    % Lymphocytes 3 %    % Monocytes 5 %     % Eosinophils 0 %    % Basophils 0 %    % Immature Granulocytes 1 %    NRBCs per 100 WBC 0 <1 /100    Absolute Neutrophils 14.3 (H) 1.6 - 8.3 10e3/uL    Absolute Lymphocytes 0.4 (L) 0.8 - 5.3 10e3/uL    Absolute Monocytes 0.8 0.0 - 1.3 10e3/uL    Absolute Eosinophils 0.0 0.0 - 0.7 10e3/uL    Absolute Basophils 0.0 0.0 - 0.2 10e3/uL    Absolute Immature Granulocytes 0.1 (H) <=0.0 10e3/uL    Absolute NRBCs 0.0 10e3/uL   Lactic acid whole blood    Collection Time: 09/16/21  6:57 PM   Result Value Ref Range    Lactic Acid 0.8 0.7 - 2.0 mmol/L     All lab studies reviewed personally    Abdomen US, limited (RUQ only)    Result Date: 9/16/2021  EXAM: US ABDOMEN LIMITED LOCATION: [facility] DATE/TIME: 9/16/2021 7:22 PM INDICATION: abnormal CT and LFTs/bili, abnormal gallbladder on CT COMPARISON: CT abdomen the same date TECHNIQUE: Limited abdominal ultrasound. FINDINGS: GALLBLADDER: Limited evaluation due to technical factors including intercostal scanning. Exam is also limited due to the patient's body habitus. Cholelithiasis better seen on comparison CT. Mild layering sludge. Mild wall thickening. Mildly positive sonographic Washington's sign. BILE DUCTS: No biliary dilatation. The common duct measures 4 mm. LIVER: Nodular surface contour and diffusely coarsened heterogeneous echotexture. No focal mass. RIGHT KIDNEY: No hydronephrosis. PANCREAS: The visualized portions are normal. No ascites.     IMPRESSION: 1.  Cholelithiasis better seen on comparison CT and mildly positive sonographic Washington's sign. In conjunction with pericholecystic edema seen on comparison CT, these findings are suspicious for acute cholecystitis. No biliary ductal dilatation. 2.  Cirrhosis.     XR Chest Port 1 View    Result Date: 9/16/2021  EXAM: XR CHEST PORT 1 VIEW LOCATION: [facility] DATE/TIME: 9/16/2021 4:18 PM INDICATION: Hypoxia and fever. COMPARISON: None.     IMPRESSION: Previous sternotomy  for coronary artery bypass. Heart size and vascularity are normal. Shallow inspiration. Bibasilar linear opacities could represent subsegmental atelectasis versus scar. No pneumothorax nor gross pleural effusion.    CT Abdomen Pelvis w Contrast    Result Date: 9/16/2021  EXAM: CT ABDOMEN PELVIS W CONTRAST LOCATION: [facility] DATE/TIME: 9/16/2021 4:55 PM INDICATION: Abdominal pain and fever. COMPARISON: None. TECHNIQUE: CT scan of the abdomen and pelvis was performed following injection of IV contrast. Multiplanar reformats were obtained. Dose reduction techniques were used. CONTRAST: 100 mL Isovue-370 FINDINGS: LOWER CHEST: Mild pleural thickening and calcific plaque present involving right lung base with mild linear fibrosis. Very slight fibrosis at left base. Previous CABG. HEPATOBILIARY: There is mild gallbladder wall thickening and pericholecystic soft tissue stranding, numerous stones are present. Findings concerning for acute cholecystitis. No bile duct dilatation. Cirrhotic morphology liver, 4 mm presumed cyst dome of right lobe.. PANCREAS: Normal. SPLEEN: Normal. ADRENAL GLANDS: Normal. KIDNEYS/BLADDER: Subcentimeter cyst right lower pole, kidneys otherwise negative. BOWEL: No obstruction or inflammatory change. LYMPH NODES: Normal. VASCULATURE: Modest atherosclerotic plaque. PELVIC ORGANS: Normal, no ascites. MUSCULOSKELETAL: No suspicious bony lesion. Significant asymmetry soft tissue density left chest wall suggests unilateral left gynecomastia.     IMPRESSION: 1.  Cholelithiasis with inflamed appearance of gallbladder suggesting acute cholecystitis. Consider follow-up targeted ultrasound as indicated. 2.  Cirrhotic appearing liver. 3.  Presumed unilateral left gynecomastia, however, left breast malignancy not excluded by this exam..    CT Chest Pulmonary Embolism w Contrast    Result Date: 9/16/2021  EXAM: CT CHEST PULMONARY EMBOLISM W CONTRAST LOCATION: [facility]  [facility] DATE/TIME: 9/16/2021 4:55 PM INDICATION: Abdominal pain, fever, hypertension, coronary disease, atrial fibrillation, weakness, fatigue COMPARISON: None. TECHNIQUE: CT chest pulmonary angiogram during arterial phase injection of IV contrast. Multiplanar reformats and MIP reconstructions were performed. Dose reduction techniques were used. CONTRAST: isovue 370 100ml FINDINGS: ANGIOGRAM CHEST: Mildly limited due to respiratory motion. No central or definite peripheral pulmonary artery embolism. Thoracic aorta is negative for dissection. No CT evidence of right heart strain. LUNGS AND PLEURA: Right-sided calcified pleural plaque. Bibasilar atelectasis versus scarring. Mosaic lung attenuation which may reflect air trapping. No focal pneumonic consolidation or pleural effusion. 4 mm left lower lobe nodule image 144 series 7. MEDIASTINUM/AXILLAE: Asymmetric left-sided gynecomastia. Upper normal heart size. No pericardial effusion. Post open-heart surgery. Tiny hiatal hernia. CORONARY ARTERY CALCIFICATION: Previous intervention (stents or CABG). UPPER ABDOMEN: Please see the concurrent CT abdomen report. Cholelithiasis. Lobulated liver surface contour. MUSCULOSKELETAL: Osseous demineralization. Mild to moderate age indeterminate T8 vertebral body compression fracture.     IMPRESSION: 1.  No pulmonary artery embolism or thoracic aortic dissection. 2.  Findings suggesting pulmonary air trapping which can be seen with nonspecific bronchiolitis. No focal pneumonic consolidation or pleural effusion. 3.  Left lower lobe 4 mm pulmonary nodule. This can be followed per guidelines below. 4.  Mild to moderate T12 vertebral body compression fracture, age indeterminate but likely remote assuming no focal thoracic spinal pain.    Head CT w/o contrast    Result Date: 9/16/2021  EXAM: CT HEAD W/O CONTRAST LOCATION: [facility] DATE/TIME: 9/16/2021 4:55 PM INDICATION: Head injury on anticoagulation  COMPARISON: None. TECHNIQUE: Routine CT Head without IV contrast. Multiplanar reformats. Dose reduction techniques were used. FINDINGS: INTRACRANIAL CONTENTS: No intracranial hemorrhage, extraaxial collection, or mass effect.  No CT evidence of acute infarct. Moderate presumed chronic small vessel ischemic changes. Moderate generalized volume loss. No hydrocephalus. VISUALIZED ORBITS/SINUSES/MASTOIDS: No intraorbital abnormality. No paranasal sinus mucosal disease. No middle ear or mastoid effusion. BONES/SOFT TISSUES: No acute abnormality.     IMPRESSION: 1.  No acute intracranial process.    Radiology report reviewed.        Cheng Nogueira MD  [facility] Medicine Service

## 2021-09-17 NOTE — ED NOTES
[facility] ED Handoff Report    ED Chief Complaint: Fall, fever    ED Diagnosis:  (A41.9) Sepsis, due to unspecified organism, unspecified whether acute organ dysfunction present (H)  Comment:   Plan:     (K81.0) Acute cholecystitis  Comment:   Plan:        PMH:    Past Medical History:   Diagnosis Date     CAD (coronary artery disease)      CAD (coronary artery disease)      Esophageal reflux      Esophageal reflux      Hyperlipidemia      Hyperlipidemia      Hypertension      Hypertension         Code Status:  No Order     Falls Risk: Yes Band: Applied    Current Living Situation/Residence: lives in a house     Elimination Status: Continent: continent; did have some stool by rectum on arrival. Cleaned up and brief placed along with primofit.     Activity Level: SBA- has not been up since arrival    Patients Preferred Language:  English     Needed: No    Vital Signs:  /57   Pulse 84   Temp 98  F (36.7  C) (Oral)   Resp 20   Ht 1.829 m (6')   Wt 101.6 kg (224 lb)   SpO2 96%   BMI 30.38 kg/m       Pain Score: 0/10    Is the Patient Confused:  No    Last Food or Drink: 09/16/21 at 1200. Pt states he has been having gatorade when kids tell him to. He has had decreased appetite recently    Focused Assessment:  Pt has skin tear to right elbow. Otherwise has no complaints. Some redness to area above sacrum without open wounds    Tests Performed: Done: Labs and Imaging    Treatments Provided:  See MAR    Family Dynamics/Concerns: No    Family Updated On Visitor Policy: Yes    Plan of Care Communicated to Family: Yes    Who Was Updated about Plan of Care: Pete Peña Checklist Done and Signed by Patient: Yes    Covid: symptomatic, negative    Additional Information: n/a    RN: Edwina Moran   9/16/2021 7:52 PM

## 2021-09-17 NOTE — CONSULTS
General Surgery Consultation  Ashok Garcia MRN# [id number]   Age/Sex: 80 year old male YOB: 1940     Reason for consult: 1. Sepsis, due to unspecified organism, unspecified whether acute organ dysfunction present (H)    2. Acute cholecystitis            Requesting physician: Dr. Nogueira                   Assessment and Plan:   Assessment:  Cholecystitis  Possible choledocholithiasis  Cirrhosis     Plan:  Reviewed exam, lab, and imaging findings with him. He does have findings consistent with cholecystitis. Would recommend laparoscopic cholecystectomy, but given his Pradaxa, would want to wait at least 48 hours until pursuing this, so this would not take place until at least tomorrow. He also has findings of cirrhosis with elevated LFTs and bilirubin. GI has been consulted, will await their input regarding the need for ERCP. If no other procedures planned today, would be ok with a regular diet and NPO at midnight. Will continue to follow.        Sylvain Jackson PA-C  Pager - [phone]  Phone - [phone]   General Surgery           Chief Complaint:     Chief Complaint   Patient presents with     Fall     Fever        History is obtained from the patient    HPI:   Ashok Garcia is a 80 year old male who presents with RUQ with associated n/v after PO intake for the last 3 days. He presented to the ED and CT and US were consistent with cholecystitis and cirrhosis. WBC, LFTs, and total bili elevated as well. He is still having RUQ pain, but states it is somewhat improved since admission, but feels this is more due to not having any PO intake for some time now.     He takes Pradaxa daily and last took it yesterday AM. He also has had 3 abdominal surgeries that he is aware of, which include, open appendectomy, and open lysis of adhesions for SBOs.           Past Medical History:     Past Medical History:   Diagnosis Date     CAD (coronary artery disease)      CAD (coronary artery disease)       Esophageal reflux      Esophageal reflux      Hyperlipidemia      Hyperlipidemia      Hypertension      Hypertension               Past Surgical History:     Past Surgical History:   Procedure Laterality Date     C CABG, VEIN, SINGLE      Description: CABG (CABG);  Proc Date: 01/01/2008;  Comments: LIMA to LAD, SVG to diagonal and OM, radial artery to OM             Social History:    reports that he has never smoked. He has never used smokeless tobacco. He reports that he does not use drugs.           Family History:   History reviewed. No pertinent family history.           Allergies:     Allergies   Allergen Reactions     Nuts - Unspecified [Nuts] Unknown              Medications:     Prior to Admission medications    Medication Sig Start Date End Date Taking? Authorizing Provider   cyanocobalamin (VITAMIN B-12) 1000 MCG tablet [CYANOCOBALAMIN (VITAMIN B-12) 1000 MCG TABLET] Take 1,000 mcg by mouth daily. 8/11/14  Yes Provider, Historical   dabigatran ANTICOAGULANT (PRADAXA ANTICOAGULANT) 150 MG capsule Take 1 capsule (150 mg) by mouth 2 times daily Store in original 's bottle or blister pack; use within 120 days of opening. 8/5/21  Yes Gelacio Martinez MD   doxylamine (UNISOM) 25 mg tablet [DOXYLAMINE (UNISOM) 25 MG TABLET] Take 25 mg by mouth at bedtime as needed for sleep. 1/4/21  Yes Provider, Historical   folic acid (FOLVITE) 400 MCG tablet [FOLIC ACID (FOLVITE) 400 MCG TABLET] Take 400 mcg by mouth daily. 8/11/14  Yes Provider, Historical   furosemide (LASIX) 40 MG tablet Take 1 tablet (40 mg) by mouth 2 times daily  Patient taking differently: Take 40 mg by mouth 2 times daily  7/20/21  Yes Gelacio Martinez MD   losartan (COZAAR) 50 MG tablet Take 1 tablet (50 mg) by mouth daily  Patient taking differently: Take 50 mg by mouth At Bedtime  8/25/21  Yes Gelacio Martinez MD   magnesium oxide (MAG-OX) 400 mg (241.3 mg magnesium) tablet [MAGNESIUM OXIDE (MAG-OX) 400 MG (241.3 MG MAGNESIUM) TABLET]  Take 1 tablet (400 mg total) by mouth daily. 6/8/21  Yes Gelacio Martinez MD   multivitamin therapeutic tablet [MULTIVITAMIN THERAPEUTIC TABLET] Take 1 tablet by mouth daily. 6/7/18  Yes Provider, Historical   nitroglycerin (NITROSTAT) 0.4 MG SL tablet [NITROGLYCERIN (NITROSTAT) 0.4 MG SL TABLET] PLACE 1 TABLET UNDER THE TONGUE EVERY 5 MINUTES AS NEEDED FOR CHEST PAIN. 12/5/18  Yes Gelacio Martinez MD   omeprazole (PRILOSEC) 20 MG capsule [OMEPRAZOLE (PRILOSEC) 20 MG CAPSULE] Take 20 mg by mouth daily. 8/11/14  Yes Provider, Historical   simvastatin (ZOCOR) 40 MG tablet [SIMVASTATIN (ZOCOR) 40 MG TABLET] TAKE 1 TABLET BY MOUTH DAILY  Patient taking differently: Take 40 mg by mouth At Bedtime  3/29/21  Yes Gelacio Martinez MD   VIRTUSSIN AC  mg/5 mL liquid Take 1 teaspoonful by mouth every 6 hours as needed for cough  7/16/19  Yes Provider, Historical              Review of Systems:   The Review of Systems is negative other than noted in the HPI            Physical Exam:     Patient Vitals for the past 24 hrs:   BP Temp Temp src Pulse Resp SpO2 Height Weight   09/17/21 0802 138/72 97.8  F (36.6  C) Oral 80 16 94 % -- --   09/17/21 0532 124/74 97.5  F (36.4  C) Oral 69 16 95 % -- --   09/17/21 0202 107/64 98  F (36.7  C) Oral 69 16 95 % -- --   09/16/21 2248 133/80 98  F (36.7  C) Oral 85 22 98 % -- --   09/16/21 2200 -- -- -- 72 21 94 % -- --   09/16/21 2145 -- -- -- 76 17 91 % -- --   09/16/21 2130 -- -- -- 68 18 91 % -- --   09/16/21 2100 -- -- -- 75 19 93 % -- --   09/16/21 2045 -- -- -- 73 24 94 % -- --   09/16/21 1946 120/57 98  F (36.7  C) Oral 84 20 96 % -- --   09/16/21 1915 -- -- -- 77 28 98 % -- --   09/16/21 1900 110/53 -- -- 80 18 97 % -- --   09/16/21 1857 -- -- -- 80 22 97 % -- --   09/16/21 1845 120/58 -- -- 79 19 96 % -- --   09/16/21 1830 122/60 -- -- 82 18 96 % -- --   09/16/21 1815 115/58 -- -- 80 17 97 % -- --   09/16/21 1800 107/53 -- -- 83 19 97 % -- --   09/16/21 1745 99/55 -- -- 86 27 98  % -- --   09/16/21 1734 104/55 -- -- 84 29 97 % -- --   09/16/21 1730 -- -- -- 85 25 97 % -- --   09/16/21 1651 -- 99.1  F (37.3  C) Oral -- -- -- -- --   09/16/21 1645 100/49 -- -- 86 25 96 % -- --   09/16/21 1630 103/57 -- -- 92 29 96 % -- --   09/16/21 1615 112/53 -- -- 98 21 95 % 1.829 m (6') 101.6 kg (224 lb)   09/16/21 1600 (!) 143/69 -- -- 99 (!) 31 96 % -- --   09/16/21 1545 115/53 -- -- 102 26 97 % -- --   09/16/21 1530 -- -- -- 99 28 96 % -- --   09/16/21 1515 -- -- -- 101 29 96 % -- --   09/16/21 1504 111/59 (!) 102.1  F (38.9  C) Rectal 111 20 (!) 88 % -- --   09/16/21 1500 111/59 -- -- 108 (!) 38 90 % -- --          Intake/Output Summary (Last 24 hours) at 9/17/2021 0920  Last data filed at 9/17/2021 0538  Gross per 24 hour   Intake 2718 ml   Output 750 ml   Net 1968 ml      Constitutional:   awake, alert, cooperative, no apparent distress, and appears stated age       Eyes:   PERRL, conjunctiva/corneas clear, EOM's intact; no scleral edema or icterus noted        ENT:   Normocephalic, without obvious abnormality, atraumatic, Lips, mucosa, and tongue normal        Hematologic / Lymphatic:   No        Lungs:   Normal respiratory effort, no accessory muscle use       Cardiovascular:   Regular rate and rhythm       Abdomen:   Soft, obese, ttp RUQ with voluntary guarding, no rebound, healed midline and RLQ incisions       Musculoskeletal:   No obvious swelling, bruising or deformity       Skin:   Skin color and texture normal for patient, no rashes or lesions              Data:        Admission on 09/16/2021   Component Date Value     Sodium 09/16/2021 129*     Potassium 09/16/2021 3.8      Chloride 09/16/2021 94*     Carbon Dioxide (CO2) 09/16/2021 22      Anion Gap 09/16/2021 13      Urea Nitrogen 09/16/2021 17      Creatinine 09/16/2021 1.10      Calcium 09/16/2021 9.4      Glucose 09/16/2021 133*     Alkaline Phosphatase 09/16/2021 328*     AST 09/16/2021 90*     ALT 09/16/2021 82*     Protein Total  09/16/2021 7.4      Albumin 09/16/2021 3.1*     Bilirubin Total 09/16/2021 6.6*     GFR Estimate 09/16/2021 62      Troponin I 09/16/2021 0.02      CRP 09/16/2021 22.6*     Color Urine 09/17/2021 Yellow      Appearance Urine 09/17/2021 Clear      Glucose Urine 09/17/2021 Negative      Bilirubin Urine 09/17/2021 0.5 mg/dL*     Ketones Urine 09/17/2021 Negative      Specific Gravity Urine 09/17/2021 1.036*     Blood Urine 09/17/2021 0.1 mg/dL*     pH Urine 09/17/2021 6.0      Protein Albumin Urine 09/17/2021 Negative      Urobilinogen Urine 09/17/2021 <2.0      Nitrite Urine 09/17/2021 Negative      Leukocyte Esterase Urine 09/17/2021 Negative      RBC Urine 09/17/2021 1      WBC Urine 09/17/2021 3      SARS CoV2 PCR 09/16/2021 Negative      pH Venous 09/16/2021 7.46*     pCO2 Venous 09/16/2021 36      pO2 Venous 09/16/2021 28      Bicarbonate Venous 09/16/2021 25      Base Excess/Deficit (+/-) 09/16/2021 1.3      Oxyhemoglobin Venous 09/16/2021 52.8*     O2 Sat, Venous 09/16/2021 54.1*     Lactic Acid 09/16/2021 3.3*     Procalcitonin 09/16/2021 2.19*     WBC Count 09/16/2021 15.5*     RBC Count 09/16/2021 3.67*     Hemoglobin 09/16/2021 12.5*     Hematocrit 09/16/2021 36.2*     MCV 09/16/2021 99      MCH 09/16/2021 34.1*     MCHC 09/16/2021 34.5      RDW 09/16/2021 12.8      Platelet Count 09/16/2021 173      % Neutrophils 09/16/2021 91      % Lymphocytes 09/16/2021 3      % Monocytes 09/16/2021 5      % Eosinophils 09/16/2021 0      % Basophils 09/16/2021 0      % Immature Granulocytes 09/16/2021 1      NRBCs per 100 WBC 09/16/2021 0      Absolute Neutrophils 09/16/2021 14.3*     Absolute Lymphocytes 09/16/2021 0.4*     Absolute Monocytes 09/16/2021 0.8      Absolute Eosinophils 09/16/2021 0.0      Absolute Basophils 09/16/2021 0.0      Absolute Immature Granul* 09/16/2021 0.1*     Absolute NRBCs 09/16/2021 0.0      Lactic Acid 09/16/2021 0.8      WBC Count 09/17/2021 12.2*     RBC Count 09/17/2021 3.26*      Hemoglobin 09/17/2021 11.1*     Hematocrit 09/17/2021 32.6*     MCV 09/17/2021 100      MCH 09/17/2021 34.0*     MCHC 09/17/2021 34.0      RDW 09/17/2021 12.7      Platelet Count 09/17/2021 153      Sodium 09/17/2021 135*     Potassium 09/17/2021 3.6      Chloride 09/17/2021 105      Carbon Dioxide (CO2) 09/17/2021 20*     Anion Gap 09/17/2021 10      Urea Nitrogen 09/17/2021 16      Creatinine 09/17/2021 0.85      Calcium 09/17/2021 8.9      Glucose 09/17/2021 109      Alkaline Phosphatase 09/17/2021 262*     AST 09/17/2021 93*     ALT 09/17/2021 78*     Protein Total 09/17/2021 6.3      Albumin 09/17/2021 2.6*     Bilirubin Total 09/17/2021 7.1*     GFR Estimate 09/17/2021 81      CRP 09/17/2021 23.4*         All imaging studies reviewed by me.

## 2021-09-17 NOTE — CONSULTS
Care Management Initial Consult    General Information  Assessment completed with: Patient, VM-chart review,    Type of CM/SW Visit: Initial Assessment    Primary Care Provider verified and updated as needed: Yes   Readmission within the last 30 days:        Reason for Consult: discharge planning  Advance Care Planning:            Communication Assessment  Patient's communication style: spoken language (English or Bilingual)             Cognitive  Cognitive/Neuro/Behavioral: WDL                      Living Environment:   People in home: alone     Current living Arrangements: house      Able to return to prior arrangements: yes       Family/Social Support:  Care provided by: self  Provides care for:       Children          Description of Support System:           Current Resources:   Patient receiving home care services: No     Community Resources: None  Equipment currently used at home: none  Supplies currently used at home: None    Employment/Financial:  Employment Status:          Financial Concerns:             Lifestyle & Psychosocial Needs:  Social Determinants of Health     Tobacco Use: Low Risk      Smoking Tobacco Use: Never Smoker     Smokeless Tobacco Use: Never Used   Alcohol Use:      Frequency of Alcohol Consumption:      Average Number of Drinks:      Frequency of Binge Drinking:    Financial Resource Strain:      Difficulty of Paying Living Expenses:    Food Insecurity:      Worried About Running Out of Food in the Last Year:      Ran Out of Food in the Last Year:    Transportation Needs:      Lack of Transportation (Medical):      Lack of Transportation (Non-Medical):    Physical Activity:      Days of Exercise per Week:      Minutes of Exercise per Session:    Stress:      Feeling of Stress :    Social Connections:      Frequency of Communication with Friends and Family:      Frequency of Social Gatherings with Friends and Family:      Attends Jainism Services:      Active Member of Clubs or  Organizations:      Attends Club or Organization Meetings:      Marital Status:    Intimate Partner Violence:      Fear of Current or Ex-Partner:      Emotionally Abused:      Physically Abused:      Sexually Abused:    Depression:      PHQ-2 Score:    Housing Stability:      Unable to Pay for Housing in the Last Year:      Number of Places Lived in the Last Year:      Unstable Housing in the Last Year:        Functional Status:  Prior to admission patient needed assistance:   Dependent ADLs:: Ambulation-walker  Dependent IADLs:: Independent, Transportation  Assesssment of Functional Status: Not at baseline with ADL Functioning    Mental Health Status:          Chemical Dependency Status:                Values/Beliefs:  Spiritual, Cultural Beliefs, Adventism Practices, Values that affect care:                 Additional Information:     Pt lives alone in a private residence. His son Pete and daughter-in-law check in on him and assist as needed. Patient fell today. Has felt weak lately. He has walker.   On 2 L 02 in the ED.     Discussed home care as option at discharge. Family to transport. CM to follow.     Krystal Maldonado RN, CM, [place]

## 2021-09-18 LAB
ALBUMIN SERPL-MCNC: 2.6 G/DL (ref 3.5–5)
ALP SERPL-CCNC: 350 U/L (ref 45–120)
ALT SERPL W P-5'-P-CCNC: 95 U/L (ref 0–45)
ANION GAP SERPL CALCULATED.3IONS-SCNC: 12 MMOL/L (ref 5–18)
AST SERPL W P-5'-P-CCNC: 116 U/L (ref 0–40)
BILIRUB DIRECT SERPL-MCNC: 4.8 MG/DL
BILIRUB SERPL-MCNC: 8.3 MG/DL (ref 0–1)
BUN SERPL-MCNC: 18 MG/DL (ref 8–28)
CALCIUM SERPL-MCNC: 9.3 MG/DL (ref 8.5–10.5)
CHLORIDE BLD-SCNC: 106 MMOL/L (ref 98–107)
CO2 SERPL-SCNC: 20 MMOL/L (ref 22–31)
CREAT SERPL-MCNC: 0.78 MG/DL (ref 0.7–1.3)
ERYTHROCYTE [DISTWIDTH] IN BLOOD BY AUTOMATED COUNT: 13 % (ref 10–15)
GFR SERPL CREATININE-BSD FRML MDRD: 84 ML/MIN/1.73M2
GLUCOSE BLD-MCNC: 103 MG/DL (ref 70–125)
HCT VFR BLD AUTO: 35.9 % (ref 40–53)
HGB BLD-MCNC: 11.7 G/DL (ref 13.3–17.7)
INR PPP: 1.35 (ref 0.85–1.15)
MCH RBC QN AUTO: 33.4 PG (ref 26.5–33)
MCHC RBC AUTO-ENTMCNC: 32.6 G/DL (ref 31.5–36.5)
MCV RBC AUTO: 103 FL (ref 78–100)
PLATELET # BLD AUTO: 188 10E3/UL (ref 150–450)
POTASSIUM BLD-SCNC: 3.8 MMOL/L (ref 3.5–5)
PROT SERPL-MCNC: 6.7 G/DL (ref 6–8)
RBC # BLD AUTO: 3.5 10E6/UL (ref 4.4–5.9)
SMA IGG SER-ACNC: 9 UNITS
SODIUM SERPL-SCNC: 138 MMOL/L (ref 136–145)
WBC # BLD AUTO: 11.7 10E3/UL (ref 4–11)

## 2021-09-18 PROCEDURE — 82374 ASSAY BLOOD CARBON DIOXIDE: CPT | Performed by: FAMILY MEDICINE

## 2021-09-18 PROCEDURE — 99232 SBSQ HOSP IP/OBS MODERATE 35: CPT | Performed by: FAMILY MEDICINE

## 2021-09-18 PROCEDURE — 120N000001 HC R&B MED SURG/OB

## 2021-09-18 PROCEDURE — 250N000011 HC RX IP 250 OP 636: Performed by: EMERGENCY MEDICINE

## 2021-09-18 PROCEDURE — 36415 COLL VENOUS BLD VENIPUNCTURE: CPT | Performed by: FAMILY MEDICINE

## 2021-09-18 PROCEDURE — 85610 PROTHROMBIN TIME: CPT | Performed by: FAMILY MEDICINE

## 2021-09-18 PROCEDURE — 250N000013 HC RX MED GY IP 250 OP 250 PS 637: Performed by: FAMILY MEDICINE

## 2021-09-18 PROCEDURE — 82248 BILIRUBIN DIRECT: CPT | Performed by: FAMILY MEDICINE

## 2021-09-18 PROCEDURE — 85014 HEMATOCRIT: CPT | Performed by: FAMILY MEDICINE

## 2021-09-18 RX ADMIN — PIPERACILLIN AND TAZOBACTAM 3.38 G: 3; .375 INJECTION, POWDER, FOR SOLUTION INTRAVENOUS at 16:21

## 2021-09-18 RX ADMIN — PIPERACILLIN AND TAZOBACTAM 3.38 G: 3; .375 INJECTION, POWDER, FOR SOLUTION INTRAVENOUS at 08:26

## 2021-09-18 RX ADMIN — PIPERACILLIN AND TAZOBACTAM 3.38 G: 3; .375 INJECTION, POWDER, FOR SOLUTION INTRAVENOUS at 23:43

## 2021-09-18 RX ADMIN — FUROSEMIDE 40 MG: 20 TABLET ORAL at 08:25

## 2021-09-18 RX ADMIN — LOSARTAN POTASSIUM 50 MG: 50 TABLET, FILM COATED ORAL at 08:24

## 2021-09-18 RX ADMIN — PIPERACILLIN AND TAZOBACTAM 3.38 G: 3; .375 INJECTION, POWDER, FOR SOLUTION INTRAVENOUS at 00:24

## 2021-09-18 RX ADMIN — Medication 400 MCG: at 08:25

## 2021-09-18 NOTE — PROGRESS NOTES
GASTROENTEROLOGY PROGRESS NOTE     SUBJECTIVE   Patient denies pain. Reports drinking more heavily this summer and then quit for 3 weeks as of mid-August. Then started drinking again (last was Tuesday night) but reports it was less than before.     OBJECTIVE     Vitals Blood pressure (!) 156/81, pulse 83, temperature 97.6  F (36.4  C), temperature source Oral, resp. rate 20, height 1.829 m (6'), weight 101.6 kg (224 lb), SpO2 91 %.      Physical exam:    A&O, NAD  Abd obese, NT throughout    LABORATORY    ELECTROLYTE PANEL   Recent Labs   Lab 09/18/21  0706 09/17/21  0641 09/16/21  1517    135* 129*   POTASSIUM 3.8 3.6 3.8   CHLORIDE 106 105 94*   CO2 20* 20* 22    109 133*   CR 0.78 0.85 1.10   BUN 18 16 17      HEMATOLOGY PANEL   Recent Labs   Lab 09/18/21  0706 09/17/21  0850 09/17/21  0641 09/16/21  1517   HGB 11.7*  --  11.1* 12.5*   *  --  100 99   WBC 11.7*  --  12.2* 15.5*     --  153 173   INR 1.35* 1.53*  --   --       LIVER AND PANCREAS PANEL   Recent Labs   Lab 09/18/21  0706 09/17/21  0850 09/17/21  0641 09/16/21  1517   *  --  93* 90*   ALT 95*  --  78* 82*   ALKPHOS 350*  --  262* 328*   BILITOTAL 8.3*  --  7.1* 6.6*   LIPASE  --  105*  --   --      IMAGING STUDIES        I have reviewed the current diagnostic and laboratory tests.              IMPRESSION   1) Acute cholecystitis- by imaging though exam is rather benign  2) Cirrhosis- suspect EtOH but serologic evaluation in progress. Ferritin high but likely acute phase reactant (other Fe levels low). AFP normal.   3) Elevated liver tests- suspect related to EtOH hepatitis on cirrhosis rather than biliary issue. Duct is not dilated and no current abdominal symptoms. MELD score 21 portends higher perioperative risk.  4) A fib on pradaxa (held)  5) CAD/CHF- no acute issues  6) Recent fall     RECOMMENDATIONS   Discussed with CHRISTOPHER from surgery team. I am hesitant to recommend surgery given his elevated MELD score (and  higher LFTs today) as perioperative risk is elevated. I ultimately defer to them however.  Follow daily LFTs. If they continue to rise, we may have to think more seriously about a potential biliary process. I feel MRCP would be low yield as his duct is not dilated and LFTs (if they continue to rise) are high enough that eventual ERCP or EUS/ERCP may need to be considered.   Serologic eval pending  Complete EtOH abstinence recommended  Will arrange eventual [facility] outpatient follow up  Okay with diet if no surgery planned.  Will continue to follow.              Chrissy Soria MD  Thank you for the opportunity to participate in the care of this patient.   Please feel free to call me with any questions or concerns.  Phone number [phone].

## 2021-09-18 NOTE — PROGRESS NOTES
[facility]    PROGRESS NOTE - Hospitalist Service    Assessment and Plan    80 year old male who presented after a fall and was found to be febrile and subsequently found to have cholecystitis.     1.  Acute cholecystitis with sepsis:  ---sepsis resolved  --- Initial lactate 3.3, normalized after IV fluids.    ---Blood pressure okay.    ---White count and LFTs elevated and abdominal CT showed cholelithiasis with inflamed gallbladder consistent with cholecystitis.    ---GI consulted in the ED. Concerned for hepatitis  --- Surgery consulted as well; awaiting further work-up and testing in regards to cirrhosis for timing of surgery versus HIDA scan  ---now on regular diet  ---Continue IV Zosyn  --- 917 did discontinue vancomycin.  --- History reviewed.  INR stable.  Moderate anesthetic risk undergo moderate risk procedure    Elevated lft; new dx cirrhosis  --- Meld score 21  --- Appreciate GI note.  Continue to monitor LFTs daily for need for ERCP as his EUS perioperatively.  ---appreciated GI consult; concern for hepatitis, alcohol effect; no concern for obstruction at this point  ---trend lft  ---no bile duct dilation on US  ---holding statin  ---checking hepatits panel, alpha 1 and ceruloplasmin as well as F active EIA      Heart failure with preserved ejection fraction:  --- No evidence of exacerbation     ---was Holding Lasix and losartan given sepsis.  Did resume today.    ---  Last echo August 3 showed EF of 55 to 60%.  --- Note; exercise tolerance decent.  Moderate risk undergo moderate risk procedure if surgery deemed necessary    Hypertension  ---bp up a bit     Persistent atrial fibrillation:   ---On Pradaxa, will hold with upcoming procedure.      Coronary artery disease:  ---- No current evidence of exacerbation.   --- Not on beta-blocker.  --- Troponin normal. .     Incidental finding of gynecomastia versus breast mass on imaging.  This can be followed up as outpatient.      COVID  "STATUS; negatoive  VTE prophylaxis:  Pneumatic Compression Devices  DIET: Orders Placed This Encounter      Low Saturated Fat Na <2400 mg    Disposition; unclear, pending clinical progression  Barriers to discharge: ; LFT< plan for possible surgery  Code Status: Full Code        Subjective:  Still several questions on why he is not having surgery.  Tells me he really had not drank for quite some time.  Notes \"3 small drinks [place] the [place]\" sometime in mid August and then no drinks until Tuesday.    Pain controlled.  No symptoms withdrawal.  Denies chest pain or shortness of breath.  Denies anesthetic difficulty.,    PHYSICAL EXAM  B/P: 138/72, T: 97.8, P: 80, R: 16     Intake/Output Summary (Last 24 hours) at 9/17/2021 0824  Last data filed at 9/17/2021 0538  Gross per 24 hour   Intake 2718 ml   Output 750 ml   Net 1968 ml      Vitals:    09/16/21 1615   Weight: 101.6 kg (224 lb)       General Appearance: pleasant, NAD  Respiratory: CTA_B  Cardiovascular: Irr irr  GI: soft, today no significant abdominal tenderness  Skin: no open areas  Musculoskeletal: trace bilateral edema  Neurologic: grossly intact, no focal deficits apprecioated      PERTINENT LABS/IMAGING:    Recent Results (from the past 24 hour(s))   INR    Collection Time: 09/17/21  8:50 AM   Result Value Ref Range    INR 1.53 (H) 0.85 - 1.15   Ferritin    Collection Time: 09/17/21  8:50 AM   Result Value Ref Range    Ferritin 1,199 (H) 27 - 300 ng/mL   Iron    Collection Time: 09/17/21  8:50 AM   Result Value Ref Range    Iron 40 (L) 42 - 175 ug/dL   Iron binding panel    Collection Time: 09/17/21  8:50 AM   Result Value Ref Range    Iron 40 (L) 42 - 175 ug/dL    Transferrin 145 (L) 212 - 360 mg/dL    Transferrin Saturation, Calculated 22 20 - 50 %    Transferrin IBC, Calculated 181 (L) 313 - 563 ug/dL   AFP tumor marker    Collection Time: 09/17/21  8:50 AM   Result Value Ref Range    AFP tumor marker 2.1 <=8.5 ng/mL   Lipase    Collection Time: " 09/17/21  8:50 AM   Result Value Ref Range    Lipase 105 (H) 0 - 52 U/L   Bilirubin direct    Collection Time: 09/17/21  8:50 AM   Result Value Ref Range    Bilirubin Direct 4.4 (H) <=0.5 mg/dL   CBC with platelets    Collection Time: 09/18/21  7:06 AM   Result Value Ref Range    WBC Count 11.7 (H) 4.0 - 11.0 10e3/uL    RBC Count 3.50 (L) 4.40 - 5.90 10e6/uL    Hemoglobin 11.7 (L) 13.3 - 17.7 g/dL    Hematocrit 35.9 (L) 40.0 - 53.0 %     (H) 78 - 100 fL    MCH 33.4 (H) 26.5 - 33.0 pg    MCHC 32.6 31.5 - 36.5 g/dL    RDW 13.0 10.0 - 15.0 %    Platelet Count 188 150 - 450 10e3/uL   Basic metabolic panel    Collection Time: 09/18/21  7:06 AM   Result Value Ref Range    Sodium 138 136 - 145 mmol/L    Potassium 3.8 3.5 - 5.0 mmol/L    Chloride 106 98 - 107 mmol/L    Carbon Dioxide (CO2) 20 (L) 22 - 31 mmol/L    Anion Gap 12 5 - 18 mmol/L    Urea Nitrogen 18 8 - 28 mg/dL    Creatinine 0.78 0.70 - 1.30 mg/dL    Calcium 9.3 8.5 - 10.5 mg/dL    Glucose 103 70 - 125 mg/dL    GFR Estimate 84 >60 mL/min/1.73m2   Hepatic panel    Collection Time: 09/18/21  7:06 AM   Result Value Ref Range    Bilirubin Total 8.3 (H) 0.0 - 1.0 mg/dL    Bilirubin Direct 4.8 (H) <=0.5 mg/dL    Protein Total 6.7 6.0 - 8.0 g/dL    Albumin 2.6 (L) 3.5 - 5.0 g/dL    Alkaline Phosphatase 350 (H) 45 - 120 U/L     (H) 0 - 40 U/L    ALT 95 (H) 0 - 45 U/L   INR    Collection Time: 09/18/21  7:06 AM   Result Value Ref Range    INR 1.35 (H) 0.85 - 1.15        Abdomen US, limited (RUQ only)   Final Result   IMPRESSION:   1.  Cholelithiasis better seen on comparison CT and mildly positive sonographic [place]'s sign. In conjunction with pericholecystic edema seen on comparison CT, these findings are suspicious for acute cholecystitis. No biliary ductal dilatation.   2.  Cirrhosis.            CT Abdomen Pelvis w Contrast   Final Result   IMPRESSION:    1.  Cholelithiasis with inflamed appearance of gallbladder suggesting acute cholecystitis. Consider  follow-up targeted ultrasound as indicated.   2.  Cirrhotic appearing liver.   3.  Presumed unilateral left gynecomastia, however, left breast malignancy not excluded by this exam..      CT Chest Pulmonary Embolism w Contrast   Final Result   IMPRESSION:   1.  No pulmonary artery embolism or thoracic aortic dissection.   2.  Findings suggesting pulmonary air trapping which can be seen with nonspecific bronchiolitis. No focal pneumonic consolidation or pleural effusion.   3.  Left lower lobe 4 mm pulmonary nodule. This can be followed per guidelines below.   4.  Mild to moderate T12 vertebral body compression fracture, age indeterminate but likely remote assuming no focal thoracic spinal pain.      Head CT w/o contrast   Final Result   IMPRESSION:   1.  No acute intracranial process.      XR Chest Port 1 View   Final Result   IMPRESSION: Previous sternotomy for coronary artery bypass. Heart size and vascularity are normal. Shallow inspiration. Bibasilar linear opacities could represent subsegmental atelectasis versus scar. No pneumothorax nor gross pleural effusion.           Cindy Meredith MD  [facility]  [phone]

## 2021-09-18 NOTE — PLAN OF CARE
Problem: Adult Inpatient Plan of Care  Goal: Plan of Care Review  Outcome: Improving   Pt A&O x4.  Pt repositioned independently.  Full bed change performed due to male external catheter leaking.  IV antibiotics administered.  VSS.  Will continue to monitor.

## 2021-09-18 NOTE — PROGRESS NOTES
General Surgery Progress Note  Hospital Day # 2    Subjective:   Pt is feeling okay not complaining of significant abdominal pain.  He denies nausea and vomiting.    Vitals:    09/18/21 0017 09/18/21 0745 09/18/21 1019 09/18/21 1200   BP: (!) 160/80 (!) 185/92 (!) 156/81 (!) 177/98   BP Location: Right arm Right arm Right arm Right arm   Pulse: 84 82 83 88   Resp: 16 20 18   Temp: 97.8  F (36.6  C) 97.6  F (36.4  C)  98.2  F (36.8  C)   TempSrc: Oral Oral  Oral   SpO2: 95% 91%  93%   Weight:       Height:           Physical Exam:  GEN: Patient is in no acute distress,  obese, jaundiced  Lungs:  CTA,  CV:       RRR  Ab:       Soft, + BS, some tenderness to palpation in the right upper quadrant    Recent Labs   Lab 09/18/21  0706   WBC 11.7*   HGB 11.7*   HCT 35.9*          Recent Labs   Lab 09/18/21  0706      CO2 20*   BUN 18   ALBUMIN 2.6*   ALKPHOS 350*   ALT 95*   *     EXAM: US ABDOMEN LIMITED  LOCATION: [facility]  DATE/TIME: 9/16/2021 7:22 PM     INDICATION: abnormal CT and LFTs/bili, abnormal gallbladder on CT  COMPARISON: CT abdomen the same date  TECHNIQUE: Limited abdominal ultrasound.     FINDINGS:     GALLBLADDER: Limited evaluation due to technical factors including intercostal scanning. Exam is also limited due to the patient's body habitus. Cholelithiasis better seen on comparison CT. Mild layering sludge. Mild wall thickening. Mildly positive   sonographic Washington's sign.     BILE DUCTS: No biliary dilatation. The common duct measures 4 mm.     LIVER: Nodular surface contour and diffusely coarsened heterogeneous echotexture. No focal mass.     RIGHT KIDNEY: No hydronephrosis.     PANCREAS: The visualized portions are normal.     No ascites.                                                                      IMPRESSION:  1.  Cholelithiasis better seen on comparison CT and mildly positive sonographic Washington's sign. In conjunction with pericholecystic edema  seen on comparison CT, these findings are suspicious for acute cholecystitis. No biliary ductal dilatation.  2.  Cirrhosis.    Assessment: Given the ultrasound results I was planning to remove this patient's gallbladder believing it was contributing to the patient's presentation and abnormal liver function studies.  Clearly this is a confused situation where the patient has cirrhosis in addition to his cholelithiasis and it might be a cirrhosis versus alcoholic hepatitis that is the source of the patient's right upper quadrant pain.  The patient does not seem to be at imminent risk from his gallbladder based on his physical exam and given his elevated risk of surgery with a meld score of 21 I will hold off on doing surgery for now.  If we need further evidence to help decide if this gallbladder needs to come out during this hospitalization we could evaluate him with a HIDA scan.    Plan: No surgery for now given the patient's increased surgical risks.  I will follow with gastroenterology and the primary care team to provide the best care possible for this patient.    Resume regular diet.    Bernardo Elizondo MD  [facility]  [phone]

## 2021-09-18 NOTE — PLAN OF CARE
Problem: Risk for Delirium  Goal: Optimal Coping  Outcome: No Change   Patient aware of his situation and orientated x4. Patient denied pain or discomfort during shift and encouraged to get out of bed. Patient encouraged to use urinal vs. Incontinent in brief. Patient had a couple of incontinent episodes during shift today and able to use urinal for a void. Patient updated that he can eat since procedure not being done today.

## 2021-09-19 ENCOUNTER — APPOINTMENT (OUTPATIENT)
Dept: OCCUPATIONAL THERAPY | Facility: HOSPITAL | Age: 81
DRG: 854 | End: 2021-09-19
Attending: FAMILY MEDICINE
Payer: MEDICARE

## 2021-09-19 ENCOUNTER — APPOINTMENT (OUTPATIENT)
Dept: PHYSICAL THERAPY | Facility: HOSPITAL | Age: 81
DRG: 854 | End: 2021-09-19
Attending: FAMILY MEDICINE
Payer: MEDICARE

## 2021-09-19 LAB
ALBUMIN SERPL-MCNC: 2.7 G/DL (ref 3.5–5)
ALP SERPL-CCNC: 401 U/L (ref 45–120)
ALT SERPL W P-5'-P-CCNC: 102 U/L (ref 0–45)
AST SERPL W P-5'-P-CCNC: 106 U/L (ref 0–40)
BILIRUB DIRECT SERPL-MCNC: 4.2 MG/DL
BILIRUB SERPL-MCNC: 7.9 MG/DL (ref 0–1)
PROT SERPL-MCNC: 6.9 G/DL (ref 6–8)

## 2021-09-19 PROCEDURE — 250N000011 HC RX IP 250 OP 636: Performed by: EMERGENCY MEDICINE

## 2021-09-19 PROCEDURE — 99231 SBSQ HOSP IP/OBS SF/LOW 25: CPT | Performed by: PHYSICIAN ASSISTANT

## 2021-09-19 PROCEDURE — 97535 SELF CARE MNGMENT TRAINING: CPT | Mod: GO

## 2021-09-19 PROCEDURE — 97166 OT EVAL MOD COMPLEX 45 MIN: CPT | Mod: GO

## 2021-09-19 PROCEDURE — 120N000001 HC R&B MED SURG/OB

## 2021-09-19 PROCEDURE — 97162 PT EVAL MOD COMPLEX 30 MIN: CPT | Mod: GP

## 2021-09-19 PROCEDURE — 97116 GAIT TRAINING THERAPY: CPT | Mod: GP

## 2021-09-19 PROCEDURE — 97110 THERAPEUTIC EXERCISES: CPT | Mod: GO

## 2021-09-19 PROCEDURE — 250N000013 HC RX MED GY IP 250 OP 250 PS 637: Performed by: FAMILY MEDICINE

## 2021-09-19 PROCEDURE — 82040 ASSAY OF SERUM ALBUMIN: CPT | Performed by: INTERNAL MEDICINE

## 2021-09-19 PROCEDURE — 97530 THERAPEUTIC ACTIVITIES: CPT | Mod: GP

## 2021-09-19 PROCEDURE — 99233 SBSQ HOSP IP/OBS HIGH 50: CPT | Performed by: HOSPITALIST

## 2021-09-19 PROCEDURE — 36415 COLL VENOUS BLD VENIPUNCTURE: CPT | Performed by: INTERNAL MEDICINE

## 2021-09-19 RX ORDER — HYDRALAZINE HYDROCHLORIDE 20 MG/ML
5 INJECTION INTRAMUSCULAR; INTRAVENOUS EVERY 6 HOURS PRN
Status: DISCONTINUED | OUTPATIENT
Start: 2021-09-19 | End: 2021-09-24 | Stop reason: HOSPADM

## 2021-09-19 RX ORDER — DOCUSATE SODIUM 100 MG/1
100 CAPSULE, LIQUID FILLED ORAL 2 TIMES DAILY PRN
Status: DISCONTINUED | OUTPATIENT
Start: 2021-09-19 | End: 2021-09-24 | Stop reason: HOSPADM

## 2021-09-19 RX ORDER — LANOLIN ALCOHOL/MO/W.PET/CERES
3 CREAM (GRAM) TOPICAL
Status: DISCONTINUED | OUTPATIENT
Start: 2021-09-19 | End: 2021-09-24 | Stop reason: HOSPADM

## 2021-09-19 RX ORDER — CARBOXYMETHYLCELLULOSE SODIUM 5 MG/ML
1 SOLUTION/ DROPS OPHTHALMIC 3 TIMES DAILY PRN
Status: DISCONTINUED | OUTPATIENT
Start: 2021-09-19 | End: 2021-09-24 | Stop reason: HOSPADM

## 2021-09-19 RX ORDER — CALCIUM CARBONATE 500 MG/1
500 TABLET, CHEWABLE ORAL 3 TIMES DAILY PRN
Status: DISCONTINUED | OUTPATIENT
Start: 2021-09-19 | End: 2021-09-24 | Stop reason: HOSPADM

## 2021-09-19 RX ORDER — BISACODYL 10 MG
10 SUPPOSITORY, RECTAL RECTAL DAILY PRN
Status: DISCONTINUED | OUTPATIENT
Start: 2021-09-19 | End: 2021-09-22

## 2021-09-19 RX ADMIN — PIPERACILLIN AND TAZOBACTAM 3.38 G: 3; .375 INJECTION, POWDER, FOR SOLUTION INTRAVENOUS at 16:35

## 2021-09-19 RX ADMIN — FUROSEMIDE 40 MG: 20 TABLET ORAL at 08:29

## 2021-09-19 RX ADMIN — PIPERACILLIN AND TAZOBACTAM 3.38 G: 3; .375 INJECTION, POWDER, FOR SOLUTION INTRAVENOUS at 08:28

## 2021-09-19 RX ADMIN — LOSARTAN POTASSIUM 50 MG: 50 TABLET, FILM COATED ORAL at 08:29

## 2021-09-19 RX ADMIN — Medication 400 MCG: at 08:27

## 2021-09-19 NOTE — PROGRESS NOTES
09/19/21 1400   Quick Adds   Type of Visit Initial PT Evaluation   Living Environment   People in home alone   Current Living Arrangements house   Home Accessibility no concerns   Number of Stairs, Within Home, Primary 2  (may have a couple stairs from garage into house)   Transportation Anticipated car, drives self;family or friend will provide   Self-Care   Usual Activity Tolerance good   Current Activity Tolerance moderate   Equipment Currently Used at Home walker, rolling  (4WW)   Disability/Function   Hearing Difficulty or Deaf no   Concentrating, Remembering or Making Decisions Difficulty no   Difficulty Communicating no   Walking or Climbing Stairs Difficulty yes   Walking or Climbing Stairs ambulation difficulty, requires equipment;transferring difficulty, requires equipment   Dressing/Bathing Difficulty no   Fall history within last six months yes   Number of times patient has fallen within last six months 1   General Information   Onset of Illness/Injury or Date of Surgery 09/16/21   Referring Physician Cindy Meredith MD   Patient/Family Therapy Goals Statement (PT) Pt wants to improve LE strength to minmize falls and return home.   Existing Precautions/Restrictions fall   Cognition   Orientation Status (Cognition) oriented to;person;situation   Pain Assessment   Patient Currently in Pain No   Integumentary/Edema   Integumentary/Edema other (describe)   Integumentary/Edema Comments B LE edema, compression socks donned   Posture    Posture Not impaired   Range of Motion (ROM)   ROM Quick Adds ROM WNL   Strength Comprehensive (MMT)   Comment, General Manual Muscle Testing (MMT) Assessment Generalized functional LE weakness in B hip stabilizers   Strength   Manual Muscle Testing Quick Adds Deficits observed during functional mobility   Bed Mobility   Comment (Bed Mobility) SBA sit<>supine   Transfers   Transfer Safety Comments SBA sit<>stand with use of FWW in standing for balance   Gait/Stairs (Locomotion)    [place] Level (Gait) supervision   Assistive Device (Gait) walker, 4-wheeled   Distance in Feet (Required for LE Total Joints) 300   Pattern (Gait) step-through   Deviations/Abnormal Patterns (Gait) gait speed decreased;stride length decreased   Balance   Balance Comments Requires use of 4WW for UE support during ambulation   Sensory Examination   Sensory Perception Comments Diminished light touch sensation to right heel   Clinical Impression   Criteria for Skilled Therapeutic Intervention yes, treatment indicated   PT Diagnosis (PT) impaired functional mobility   Influenced by the following impairments balance, activity tolerance   Functional limitations due to impairments ambulation, transfers   Clinical Presentation Stable/Uncomplicated   Clinical Presentation Rationale Pt is independent with ambulation and transfers at baseline, uses 4WW at home.   Clinical Decision Making (Complexity) low complexity   Therapy Frequency (PT) Daily   Predicted Duration of Therapy Intervention (days/wks) 1 week   Planned Therapy Interventions (PT) balance training;gait training;home exercise program;patient/family education;strengthening;transfer training   Anticipated Equipment Needs at Discharge (PT) walker, rolling   Risk & Benefits of therapy have been explained evaluation/treatment results reviewed;risks/benefits reviewed;patient   PT Discharge Planning    PT Discharge Recommendation (DC Rec) home   PT Rationale for DC Rec Anticipate pt will be safe to discharge home if able to consistently utilize assistive device with ambulation and transfers to minimize falls. Recommend family assistance/stay for several days following discharge to ensure safety. May consider life line services to call for assistance in case of future fall.   PT Brief overview of current status  Pt is SBA with ambulation using 4WW. Pt does not have stairs at home and receives assistance from [facility] [place] for additional housework.   Total Evaluation  Time   Total Evaluation Time (Minutes) 10

## 2021-09-19 NOTE — PROGRESS NOTES
09/19/21 1030   Quick Adds   Type of Visit Initial Occupational Therapy Evaluation       Present no   Living Environment   People in home alone   Current Living Arrangements house   Home Accessibility stairs within home   Number of Stairs, Within Home, Primary 2   Self-Care   Usual Activity Tolerance good   Current Activity Tolerance moderate   Equipment Currently Used at Home walker, rolling;shower chair   Activity/Exercise/Self-Care Comment Pt is independent with all ADLs, drives   Disability/Function   Hearing Difficulty or Deaf no   Concentrating, Remembering or Making Decisions Difficulty no   Difficulty Communicating no   Difficulty Eating/Swallowing no   Walking or Climbing Stairs Difficulty yes   Dressing/Bathing Difficulty no   Toileting issues no   Fall history within last six months yes   General Information   Onset of Illness/Injury or Date of Surgery 09/18/21   Patient/Family Therapy Goal Statement (OT) home   Existing Precautions/Restrictions fall   Cognitive Status Examination   Orientation Status orientation to person, place and time;person;place;time   Affect/Mental Status (Cognitive) WNL   Follows Commands WNL   Visual Perception   Visual Impairment/Limitations WNL   Sensory   Sensory Comments neuropaty   Range of Motion Comprehensive   General Range of Motion no range of motion deficits identified   Strength Comprehensive (MMT)   Comment, General Manual Muscle Testing (MMT) Assessment generalized weakness   Coordination   Upper Extremity Coordination No deficits were identified   Bed Mobility   Comment (Bed Mobility) NT   Transfers   Transfer Comments CGA   Balance   Balance Comments SBA/CGA   Activities of Daily Living   BADL Assessment   (Min A with LB dressing, CGA with G/H and toileting)   Clinical Impression   Criteria for Skilled Therapeutic Interventions Met (OT) yes;skilled treatment is necessary   OT Diagnosis Impaired ADL independence   OT Problem  List-Impairments impacting ADL problems related to;activity tolerance impaired;balance;flexibility;strength;mobility   Assessment of Occupational Performance 1-3 Performance Deficits   Planned Therapy Interventions (OT) ADL retraining;balance training;bed mobility training;strengthening;transfer training   Clinical Decision Making Complexity (OT) moderate complexity   Therapy Frequency (OT) Daily   Predicted Duration of Therapy 5 days   Risk & Benefits of therapy have been explained evaluation/treatment results reviewed;care plan/treatment goals reviewed;participants included;patient;son   OT Discharge Planning    OT Discharge Recommendation (DC Rec) Home with assist;home with home care occupational therapy   OT Rationale for DC Rec Pt could go home with family support as family is able to stay with him a few days.  Pt is areeable to having assist from [facility] [place].  Would also recommend home OT.   Total Evaluation Time (Minutes)   Total Evaluation Time (Minutes) 10

## 2021-09-19 NOTE — PROGRESS NOTES
General Surgery Progress Note:    Hospital Day # 3    ASSESSMENT:   1. Sepsis, due to unspecified organism, unspecified whether acute organ dysfunction present (H)    2. Acute cholecystitis        Ashok Garcia is a 80 year old male with possible cholecystitis, cirrhosis    PLAN:   -ADAT  -Monitor labs  -Could consider HIDA for possibility of cholecystitis, but he is tolerating a diet, so cholecystitis seems less likely and he is at higher surgical risk    Sylvain Jackson PA-C  Pager - [phone]  Phone - [phone]   General Surgery    SUBJECTIVE:   Ashok Garcia is doing ok, minimal pain, tolerating diet    Patient Vitals for the past 24 hrs:   BP Temp Temp src Pulse Resp SpO2   09/19/21 0712 (!) 141/69 97.6  F (36.4  C) Oral 78 18 95 %   09/19/21 0415 129/84 97.8  F (36.6  C) Axillary 79 18 90 %   09/18/21 2346 (!) 157/81 98.5  F (36.9  C) Axillary 82 18 92 %   09/18/21 2300 (!) 157/81 98.5  F (36.9  C) Axillary 82 18 92 %   09/18/21 1506 134/84 97.8  F (36.6  C) Oral 80 16 93 %   09/18/21 1200 (!) 177/98 98.2  F (36.8  C) Oral 88 18 93 %   09/18/21 1019 (!) 156/81 -- -- 83 -- --       Physical Exam:  General: NAD, pleasant  CV:RRR  LUNGS:CTA bilaterally  ABD: soft, obese, mild ttp RUQ, no rebound or guarding  EXT:no CCE     Lab Results   Component Value Date    WBC 11.7 09/18/2021    HGB 11.7 09/18/2021    HCT 35.9 09/18/2021     09/18/2021     09/18/2021     INR/Prothrombin Time  Recent Labs   Lab 09/18/21  0706      CO2 20*   BUN 18     Lab Results   Component Value Date     (H) 09/19/2021     (H) 09/19/2021    ALKPHOS 401 (H) 09/19/2021

## 2021-09-19 NOTE — PLAN OF CARE
"  Problem: Risk for Delirium  Goal: Improved Behavioral Control  Outcome: Improving  Goal: Improved Sleep  Outcome: Improving   Pt a/o x4, VSS, was awake at night with some confusion states \"this place looks different\" was oriented x4, IV leaking was replaced, uses urinal at bed site, encourage pt , denied pain when asked.   "

## 2021-09-19 NOTE — PLAN OF CARE
Pt ate breakfast, denies pain or nausea.  Pt's 3rd son is visiting today.  Pt on IV zosyn.  PT came by today.  Pt can make needs known.  Uses urinal and can use bathroom, standby assist.Destiny Petit RN

## 2021-09-19 NOTE — PROGRESS NOTES
GASTROENTEROLOGY PROGRESS NOTE     SUBJECTIVE   Patient tolerating diet. No pain.     OBJECTIVE     Vitals Blood pressure (!) 141/69, pulse 78, temperature 97.6  F (36.4  C), temperature source Oral, resp. rate 18, height 1.829 m (6'), weight 101.6 kg (224 lb), SpO2 95 %.      Physical exam:    A&O, NAD  Abd obese, NT throughout. Negative Washington sign    LABORATORY    ELECTROLYTE PANEL   Recent Labs   Lab 09/18/21  0706 09/17/21  0641 09/16/21  1517    135* 129*   POTASSIUM 3.8 3.6 3.8   CHLORIDE 106 105 94*   CO2 20* 20* 22    109 133*   CR 0.78 0.85 1.10   BUN 18 16 17      HEMATOLOGY PANEL   Recent Labs   Lab 09/18/21  0706 09/17/21  0850 09/17/21  0641 09/16/21  1517   HGB 11.7*  --  11.1* 12.5*   *  --  100 99   WBC 11.7*  --  12.2* 15.5*     --  153 173   INR 1.35* 1.53*  --   --       LIVER AND PANCREAS PANEL   Recent Labs   Lab 09/19/21  0820 09/18/21  0706 09/17/21  0850 09/17/21  0641   * 116*  --  93*   * 95*  --  78*   ALKPHOS 401* 350*  --  262*   BILITOTAL 7.9* 8.3*  --  7.1*   LIPASE  --   --  105*  --      IMAGING STUDIES        I have reviewed the current diagnostic and laboratory tests.              IMPRESSION   1) Acute cholecystitis- by imaging though exam is rather benign. Choleystectomy has been deferred due to high MELD and stable clinical exam/status.  2) Cirrhosis- suspect EtOH but serologic evaluation in progress. Ferritin high but likely acute phase reactant (other Fe levels low). AFP normal. Negative ASMA. Other serology pending. MELD score 21 portends higher perioperative risk.  3) Elevated liver tests- suspect related to EtOH hepatitis on cirrhosis rather than biliary issue. Duct is not dilated and no current abdominal symptoms. However, labs have stagnated a bit and the alk phos is rising so we do have consider the possibility for sludge/stones in the duct.   4) A fib on pradaxa (held)  5) CAD/CHF- no acute issues  6) Recent fall      RECOMMENDATIONS   1) Diet as tolerated today  2) Discussed with patient and son- potential EUS/ERCP this week if persistent LFT elevation. This was also discussed with Dr. Alvarado who is coming on service tomorrow. Will make NPO at midnight so he can assess tomorrow.  3) Hold pradaxa  4) Alcohol abstinence discussed  5) Await remainder of liver workup  6) Would favor holding off on cholecystectomy another day, to see if liver tests can improve a bit more. Discussed with Sylvain from surgery team.               Chrissy Soria MD  Thank you for the opportunity to participate in the care of this patient.   Please feel free to call me with any questions or concerns.  Phone number [phone].

## 2021-09-19 NOTE — PROGRESS NOTES
[facility]    Medicine Progress Note - Hospitalist Service       Date of Admission:  9/16/2021    Assessment & Plan           Ashok Garcia is a 80 year old male admitted on 9/16/2021. He presented after a fall and was found to be febrile and subsequently found to have cholecystitis.     #Sepsis  #Possible acute cholecystitis  --- Patient presented after a fall found to be septic with fever, leukocytosis, elevated lactic acid  --- Ultrasound showed cholelithiasis, mildly positive Washington sign.CT showed pericholecystic soft tissue stranding and mild gallbladder wall thickening with numerous stones.  Bile ducts were not dilated.  Liver appears cirrhotic.  --- Patient was started on IV Zosyn and sepsis has resolved  --- Patient was not felt to have a clinical exam terribly convincing for acute cholecystitis.  GI were concerned acute hepatitis could mimic cholecystitis imaging findings.  He was felt to be higher operative risk due to new diagnosis of liver cirrhosis.  --- General surgery was consulted and did initially recommend cholecystectomy however now less certain as patient continues to not have a positive Washington sign and is tolerating an oral diet.  --- Liver enzymes continue to rise.  GI considering ERCP for tomorrow.  --- Continue IV Zosyn  --- N.p.o. after midnight     #Elevated lft; new dx cirrhosis  --- Meld score 21  --- Appreciate GI note.  Continue to monitor LFTs daily.  Some consideration of ERCP soon and he is n.p.o. after midnight  ---trend lft  ---no bile duct dilation on US or CT, but alk phos continues to rise  ---holding statin  ---checking hepatits panel, alpha 1 and ceruloplasmin  --- Needs to quit alcohol     #Heart failure with preserved ejection fraction:  --- No evidence of exacerbation     --- Continue home Lasix and losartan    ---  Last echo August 3 showed EF of 55 to 60%.  --- Note; exercise tolerance decent.  Moderate risk undergo moderate risk procedure if  surgery deemed necessary     #Hypertension  ---bp up a bit  --- Continue home Lasix and losartan  --- IV hydralazine as needed     #Persistent atrial fibrillation:   ---On Pradaxa, on hold with upcoming procedure(s).      #Coronary artery disease:  ---- No current evidence of exacerbation.   --- Not on beta-blocker.  --- Troponin normal. .     #Incidental finding of gynecomastia versus breast mass on imaging.  This can be followed up as outpatient.    #Mild macrocytic anemia, supports concerns about alcoholism       Diet: Low Saturated Fat Na <2400 mg  NPO per Anesthesia Guidelines for Procedure/Surgery Except for: Meds    DVT Prophylaxis: Moderate risk. Holding prophylaxis for imminent procedure   Martin Catheter: Not present  Central Lines: None  Code Status: Full Code      Disposition Plan   Expected discharge: 09/21/2021   recommended to prior living arrangement once SIRS/Sepsis treated, liver enzymes stabilized, diagnostic work-up complete     The patient's care was discussed with the Patient.    Dora Figueroa MD  Hospitalist Service  [facility]  Text page via [facility] Paging/Directory      Clinically Significant Risk Factors Present on Admission               ____________        Physical Exam   Vital Signs: Temp: 98.4  F (36.9  C) Temp src: Oral BP: (!) 169/87 Pulse: 89   Resp: 18 SpO2: 90 % O2 Device: None (Room air)    Weight: 224 lbs 0 oz  General: in no apparent distress, non-toxic and alert male lying in hospital bed oriented x3  HEENT: Head normocephalic atraumatic, oral mucosa moist. Sclerae anicteric  CV: Regular rhythm, normal rate, no murmurs  Resp: No wheezes, no rales or rhonchi, no focal consolidations  GI: Belly soft, nondistended, nontender, bowel sounds present  Skin: No rashes or lesions  Extremities: No peripheral edema  Psych: Normal affect, mood euthymic  Neuro: CNII-XII grossly intact, moving all 4 extremities      Data   Recent Results (from the past 24 hour(s))    Hepatic panel    Collection Time: 09/19/21  8:20 AM   Result Value Ref Range    Bilirubin Total 7.9 (H) 0.0 - 1.0 mg/dL    Bilirubin Direct 4.2 (H) <=0.5 mg/dL    Protein Total 6.9 6.0 - 8.0 g/dL    Albumin 2.7 (L) 3.5 - 5.0 g/dL    Alkaline Phosphatase 401 (H) 45 - 120 U/L     (H) 0 - 40 U/L     (H) 0 - 45 U/L     ____________  Interval History   Data reviewed today: I reviewed all medications, new labs and imaging results over the last 24 hours. I personally reviewed no images or EKG's today.  patient states doing fine today. he did not eat breakfast today because he was not sure if he would have any procedure. liver enzymes continue to increase including alk phos, I would advocate for procedure to look at the area, some consideration being given to ERCP or HIDA scan. defer decision to specialists which procedure to pursue. not ready for discharge. patient declined for me to call and update any family members.

## 2021-09-20 ENCOUNTER — ANESTHESIA EVENT (OUTPATIENT)
Dept: SURGERY | Facility: HOSPITAL | Age: 81
DRG: 854 | End: 2021-09-20
Payer: MEDICARE

## 2021-09-20 ENCOUNTER — ANCILLARY PROCEDURE (OUTPATIENT)
Dept: ULTRASOUND IMAGING | Facility: HOSPITAL | Age: 81
DRG: 854 | End: 2021-09-20
Attending: ANESTHESIOLOGY
Payer: MEDICARE

## 2021-09-20 ENCOUNTER — APPOINTMENT (OUTPATIENT)
Dept: RADIOLOGY | Facility: HOSPITAL | Age: 81
DRG: 854 | End: 2021-09-20
Attending: INTERNAL MEDICINE
Payer: MEDICARE

## 2021-09-20 ENCOUNTER — ANESTHESIA (OUTPATIENT)
Dept: SURGERY | Facility: HOSPITAL | Age: 81
DRG: 854 | End: 2021-09-20
Payer: MEDICARE

## 2021-09-20 LAB
A1AT PHENOTYP SERPL-IMP: ABNORMAL
A1AT SERPL-MCNC: 221 MG/DL
ALBUMIN SERPL-MCNC: 2.4 G/DL (ref 3.5–5)
ALP SERPL-CCNC: 326 U/L (ref 45–120)
ALT SERPL W P-5'-P-CCNC: 77 U/L (ref 0–45)
ANION GAP SERPL CALCULATED.3IONS-SCNC: 9 MMOL/L (ref 5–18)
AST SERPL W P-5'-P-CCNC: 65 U/L (ref 0–40)
BILIRUB DIRECT SERPL-MCNC: 3.1 MG/DL
BILIRUB SERPL-MCNC: 5.8 MG/DL (ref 0–1)
BUN SERPL-MCNC: 17 MG/DL (ref 8–28)
CALCIUM SERPL-MCNC: 8.9 MG/DL (ref 8.5–10.5)
CERULOPLASMIN SERPL-MCNC: 11 MG/DL (ref 20–60)
CHLORIDE BLD-SCNC: 105 MMOL/L (ref 98–107)
CO2 SERPL-SCNC: 23 MMOL/L (ref 22–31)
CREAT SERPL-MCNC: 0.93 MG/DL (ref 0.7–1.3)
ERCP: NORMAL
ERYTHROCYTE [DISTWIDTH] IN BLOOD BY AUTOMATED COUNT: 13 % (ref 10–15)
GFR SERPL CREATININE-BSD FRML MDRD: 76 ML/MIN/1.73M2
GLUCOSE BLD-MCNC: 109 MG/DL (ref 70–125)
HAV IGM SERPL QL IA: NEGATIVE
HBV CORE IGM SERPL QL IA: NEGATIVE
HBV SURFACE AG SERPL QL IA: NONREACTIVE
HCT VFR BLD AUTO: 33.5 % (ref 40–53)
HCV AB SERPL QL IA: NEGATIVE
HGB BLD-MCNC: 11.2 G/DL (ref 13.3–17.7)
MCH RBC QN AUTO: 33.4 PG (ref 26.5–33)
MCHC RBC AUTO-ENTMCNC: 33.4 G/DL (ref 31.5–36.5)
MCV RBC AUTO: 100 FL (ref 78–100)
PLATELET # BLD AUTO: 184 10E3/UL (ref 150–450)
POTASSIUM BLD-SCNC: 3.5 MMOL/L (ref 3.5–5)
PROT SERPL-MCNC: 6.2 G/DL (ref 6–8)
RBC # BLD AUTO: 3.35 10E6/UL (ref 4.4–5.9)
SODIUM SERPL-SCNC: 137 MMOL/L (ref 136–145)
UPPER EUS: NORMAL
WBC # BLD AUTO: 7.8 10E3/UL (ref 4–11)

## 2021-09-20 PROCEDURE — 250N000011 HC RX IP 250 OP 636: Performed by: EMERGENCY MEDICINE

## 2021-09-20 PROCEDURE — C1726 CATH, BAL DIL, NON-VASCULAR: HCPCS | Performed by: INTERNAL MEDICINE

## 2021-09-20 PROCEDURE — 36415 COLL VENOUS BLD VENIPUNCTURE: CPT | Performed by: HOSPITALIST

## 2021-09-20 PROCEDURE — 360N000083 HC SURGERY LEVEL 3 W/ FLUORO, PER MIN: Performed by: INTERNAL MEDICINE

## 2021-09-20 PROCEDURE — 258N000003 HC RX IP 258 OP 636: Performed by: ANESTHESIOLOGY

## 2021-09-20 PROCEDURE — 370N000017 HC ANESTHESIA TECHNICAL FEE, PER MIN: Performed by: INTERNAL MEDICINE

## 2021-09-20 PROCEDURE — 710N000009 HC RECOVERY PHASE 1, LEVEL 1, PER MIN: Performed by: INTERNAL MEDICINE

## 2021-09-20 PROCEDURE — 272N000001 HC OR GENERAL SUPPLY STERILE: Performed by: INTERNAL MEDICINE

## 2021-09-20 PROCEDURE — 99231 SBSQ HOSP IP/OBS SF/LOW 25: CPT | Performed by: PHYSICIAN ASSISTANT

## 2021-09-20 PROCEDURE — C1769 GUIDE WIRE: HCPCS | Performed by: INTERNAL MEDICINE

## 2021-09-20 PROCEDURE — 0FC98ZZ EXTIRPATION OF MATTER FROM COMMON BILE DUCT, VIA NATURAL OR ARTIFICIAL OPENING ENDOSCOPIC: ICD-10-PCS | Performed by: INTERNAL MEDICINE

## 2021-09-20 PROCEDURE — 80076 HEPATIC FUNCTION PANEL: CPT | Performed by: HOSPITALIST

## 2021-09-20 PROCEDURE — 250N000025 HC SEVOFLURANE, PER MIN: Performed by: INTERNAL MEDICINE

## 2021-09-20 PROCEDURE — 250N000011 HC RX IP 250 OP 636: Performed by: INTERNAL MEDICINE

## 2021-09-20 PROCEDURE — 85027 COMPLETE CBC AUTOMATED: CPT | Performed by: HOSPITALIST

## 2021-09-20 PROCEDURE — 250N000011 HC RX IP 250 OP 636: Performed by: NURSE ANESTHETIST, CERTIFIED REGISTERED

## 2021-09-20 PROCEDURE — 258N000003 HC RX IP 258 OP 636: Performed by: NURSE ANESTHETIST, CERTIFIED REGISTERED

## 2021-09-20 PROCEDURE — 250N000009 HC RX 250: Performed by: NURSE ANESTHETIST, CERTIFIED REGISTERED

## 2021-09-20 PROCEDURE — 250N000013 HC RX MED GY IP 250 OP 250 PS 637: Performed by: INTERNAL MEDICINE

## 2021-09-20 PROCEDURE — 999N000141 HC STATISTIC PRE-PROCEDURE NURSING ASSESSMENT: Performed by: INTERNAL MEDICINE

## 2021-09-20 PROCEDURE — 999N000180 XR SURGERY CARM FLUORO LESS THAN 5 MIN

## 2021-09-20 PROCEDURE — 120N000001 HC R&B MED SURG/OB

## 2021-09-20 PROCEDURE — 99233 SBSQ HOSP IP/OBS HIGH 50: CPT | Performed by: HOSPITALIST

## 2021-09-20 RX ORDER — LIDOCAINE HYDROCHLORIDE 20 MG/ML
INJECTION, SOLUTION INFILTRATION; PERINEURAL PRN
Status: DISCONTINUED | OUTPATIENT
Start: 2021-09-20 | End: 2021-09-20

## 2021-09-20 RX ORDER — OXYCODONE HYDROCHLORIDE 5 MG/1
5 TABLET ORAL EVERY 4 HOURS PRN
Status: DISCONTINUED | OUTPATIENT
Start: 2021-09-20 | End: 2021-09-20 | Stop reason: HOSPADM

## 2021-09-20 RX ORDER — ONDANSETRON 4 MG/1
4 TABLET, ORALLY DISINTEGRATING ORAL EVERY 30 MIN PRN
Status: DISCONTINUED | OUTPATIENT
Start: 2021-09-20 | End: 2021-09-20 | Stop reason: HOSPADM

## 2021-09-20 RX ORDER — PROPOFOL 10 MG/ML
INJECTION, EMULSION INTRAVENOUS PRN
Status: DISCONTINUED | OUTPATIENT
Start: 2021-09-20 | End: 2021-09-20

## 2021-09-20 RX ORDER — LIDOCAINE 40 MG/G
CREAM TOPICAL
Status: DISCONTINUED | OUTPATIENT
Start: 2021-09-20 | End: 2021-09-20 | Stop reason: HOSPADM

## 2021-09-20 RX ORDER — SCOLOPAMINE TRANSDERMAL SYSTEM 1 MG/1
1 PATCH, EXTENDED RELEASE TRANSDERMAL ONCE
Status: DISCONTINUED | OUTPATIENT
Start: 2021-09-20 | End: 2021-09-20 | Stop reason: HOSPADM

## 2021-09-20 RX ORDER — GLYCOPYRROLATE 0.2 MG/ML
INJECTION, SOLUTION INTRAMUSCULAR; INTRAVENOUS PRN
Status: DISCONTINUED | OUTPATIENT
Start: 2021-09-20 | End: 2021-09-20

## 2021-09-20 RX ORDER — ONDANSETRON 2 MG/ML
4 INJECTION INTRAMUSCULAR; INTRAVENOUS EVERY 6 HOURS PRN
Status: DISCONTINUED | OUTPATIENT
Start: 2021-09-20 | End: 2021-09-22

## 2021-09-20 RX ORDER — MAGNESIUM SULFATE 4 G/50ML
4 INJECTION INTRAVENOUS ONCE
Status: DISCONTINUED | OUTPATIENT
Start: 2021-09-20 | End: 2021-09-20 | Stop reason: HOSPADM

## 2021-09-20 RX ORDER — FENTANYL CITRATE 50 UG/ML
INJECTION, SOLUTION INTRAMUSCULAR; INTRAVENOUS PRN
Status: DISCONTINUED | OUTPATIENT
Start: 2021-09-20 | End: 2021-09-20

## 2021-09-20 RX ORDER — ONDANSETRON 2 MG/ML
INJECTION INTRAMUSCULAR; INTRAVENOUS PRN
Status: DISCONTINUED | OUTPATIENT
Start: 2021-09-20 | End: 2021-09-20

## 2021-09-20 RX ORDER — DEXAMETHASONE SODIUM PHOSPHATE 10 MG/ML
INJECTION, SOLUTION INTRAMUSCULAR; INTRAVENOUS PRN
Status: DISCONTINUED | OUTPATIENT
Start: 2021-09-20 | End: 2021-09-20

## 2021-09-20 RX ORDER — FLUMAZENIL 0.1 MG/ML
0.2 INJECTION, SOLUTION INTRAVENOUS
Status: ACTIVE | OUTPATIENT
Start: 2021-09-20 | End: 2021-09-21

## 2021-09-20 RX ORDER — FENTANYL CITRATE 50 UG/ML
50 INJECTION, SOLUTION INTRAMUSCULAR; INTRAVENOUS EVERY 5 MIN PRN
Status: DISCONTINUED | OUTPATIENT
Start: 2021-09-20 | End: 2021-09-20 | Stop reason: HOSPADM

## 2021-09-20 RX ORDER — FENTANYL CITRATE 50 UG/ML
50 INJECTION, SOLUTION INTRAMUSCULAR; INTRAVENOUS
Status: DISCONTINUED | OUTPATIENT
Start: 2021-09-20 | End: 2021-09-20 | Stop reason: HOSPADM

## 2021-09-20 RX ORDER — ONDANSETRON 4 MG/1
4 TABLET, ORALLY DISINTEGRATING ORAL EVERY 6 HOURS PRN
Status: DISCONTINUED | OUTPATIENT
Start: 2021-09-20 | End: 2021-09-22

## 2021-09-20 RX ORDER — SODIUM CHLORIDE, SODIUM LACTATE, POTASSIUM CHLORIDE, CALCIUM CHLORIDE 600; 310; 30; 20 MG/100ML; MG/100ML; MG/100ML; MG/100ML
INJECTION, SOLUTION INTRAVENOUS CONTINUOUS
Status: DISCONTINUED | OUTPATIENT
Start: 2021-09-20 | End: 2021-09-20 | Stop reason: HOSPADM

## 2021-09-20 RX ORDER — ONDANSETRON 2 MG/ML
4 INJECTION INTRAMUSCULAR; INTRAVENOUS EVERY 30 MIN PRN
Status: DISCONTINUED | OUTPATIENT
Start: 2021-09-20 | End: 2021-09-20 | Stop reason: HOSPADM

## 2021-09-20 RX ORDER — HYDROMORPHONE HYDROCHLORIDE 1 MG/ML
0.5 INJECTION, SOLUTION INTRAMUSCULAR; INTRAVENOUS; SUBCUTANEOUS EVERY 5 MIN PRN
Status: DISCONTINUED | OUTPATIENT
Start: 2021-09-20 | End: 2021-09-20 | Stop reason: HOSPADM

## 2021-09-20 RX ADMIN — DEXAMETHASONE SODIUM PHOSPHATE 10 MG: 10 INJECTION, SOLUTION INTRAMUSCULAR; INTRAVENOUS at 12:32

## 2021-09-20 RX ADMIN — PIPERACILLIN AND TAZOBACTAM 3.38 G: 3; .375 INJECTION, POWDER, FOR SOLUTION INTRAVENOUS at 00:05

## 2021-09-20 RX ADMIN — FENTANYL CITRATE 50 MCG: 50 INJECTION, SOLUTION INTRAMUSCULAR; INTRAVENOUS at 12:32

## 2021-09-20 RX ADMIN — FUROSEMIDE 40 MG: 20 TABLET ORAL at 14:27

## 2021-09-20 RX ADMIN — PROPOFOL 150 MG: 10 INJECTION, EMULSION INTRAVENOUS at 12:32

## 2021-09-20 RX ADMIN — GLYCOPYRROLATE 0.2 MG: 0.2 INJECTION, SOLUTION INTRAMUSCULAR; INTRAVENOUS at 12:42

## 2021-09-20 RX ADMIN — PHENYLEPHRINE HYDROCHLORIDE 100 MCG: 10 INJECTION INTRAVENOUS at 13:03

## 2021-09-20 RX ADMIN — LIDOCAINE HYDROCHLORIDE 60 MG: 20 INJECTION, SOLUTION INFILTRATION; PERINEURAL at 12:32

## 2021-09-20 RX ADMIN — SUCCINYLCHOLINE CHLORIDE 100 MG: 20 INJECTION, SOLUTION INTRAMUSCULAR; INTRAVENOUS at 12:32

## 2021-09-20 RX ADMIN — PIPERACILLIN AND TAZOBACTAM 3.38 G: 3; .375 INJECTION, POWDER, FOR SOLUTION INTRAVENOUS at 17:37

## 2021-09-20 RX ADMIN — PHENYLEPHRINE HYDROCHLORIDE 100 MCG: 10 INJECTION INTRAVENOUS at 12:45

## 2021-09-20 RX ADMIN — ONDANSETRON 4 MG: 2 INJECTION INTRAMUSCULAR; INTRAVENOUS at 12:32

## 2021-09-20 RX ADMIN — SODIUM CHLORIDE, POTASSIUM CHLORIDE, SODIUM LACTATE AND CALCIUM CHLORIDE: 600; 310; 30; 20 INJECTION, SOLUTION INTRAVENOUS at 12:28

## 2021-09-20 RX ADMIN — Medication 400 MCG: at 14:28

## 2021-09-20 RX ADMIN — PIPERACILLIN AND TAZOBACTAM 3.38 G: 3; .375 INJECTION, POWDER, FOR SOLUTION INTRAVENOUS at 08:29

## 2021-09-20 RX ADMIN — FENTANYL CITRATE 50 MCG: 50 INJECTION, SOLUTION INTRAMUSCULAR; INTRAVENOUS at 12:57

## 2021-09-20 RX ADMIN — PHENYLEPHRINE HYDROCHLORIDE 100 MCG: 10 INJECTION INTRAVENOUS at 12:57

## 2021-09-20 RX ADMIN — LOSARTAN POTASSIUM 50 MG: 50 TABLET, FILM COATED ORAL at 14:28

## 2021-09-20 ASSESSMENT — MIFFLIN-ST. JEOR: SCORE: 1769.94

## 2021-09-20 NOTE — ANESTHESIA PROCEDURE NOTES
Airway       Patient location during procedure: OR       Procedure Start/Stop Times: 9/20/2021 12:35 PM  Staff -        Anesthesiologist:  Sharda Ramirez MD       CRNA: Bishop Washington APRN CRNA       Performed By: CRNAIndications and Patient Condition       Indications for airway management: cass-procedural       Induction type:intravenous       Mask difficulty assessment: 1 - vent by mask    Final Airway Details       Final airway type: endotracheal airway       Successful airway: ETT - single  Endotracheal Airway Details        ETT size (mm): 8.0       Cuffed: yes       Successful intubation technique: video laryngoscopy       VL Blade Size: Glidescope 3       Grade View of Cords: 1       Adjucts: stylet       Position: Right       Measured from: lips       Secured at (cm): 24    Post intubation assessment        Placement verified by: capnometry, equal breath sounds and chest rise        Number of attempts at approach: 1       Number of other approaches attempted: 0       Secured with: silk tape       Ease of procedure: easy    Additional Comments       Glidescope because laryngoscope handle malfunctioned while in use and bulb would not light.

## 2021-09-20 NOTE — PLAN OF CARE
Problem: Risk for Delirium  Goal: Improved Behavioral Control  Outcome: Improving  Goal: Improved Sleep  Outcome: Improving   Pt is a/o x4, denied pain or discomfort, high BP of 160/74 but asymptomatic, was awake most of the night, d/t PIV on right AC causing IV pump to alarm. NPO from might night.

## 2021-09-20 NOTE — PLAN OF CARE
ENDOSCOPIC ULTRASOUND  ENDOSCOPIC RETROGRADE CHOLANGIOPANCREATOGRAPHY, BILIARY SPHINCTEROTOMY STONE EXTRACTION completed today by Dr. Alvarado.  Results discussed at bedside with patient and sons post procedure.    Okay for clear liquids this evening.   Bill denies any abdominal pain today.  Up walking with assist in halls earlier this AM.  No acute concerns at this time.      Johana Mathew RN

## 2021-09-20 NOTE — PROGRESS NOTES
Care Management Follow Up    Length of Stay (days): 4    Expected Discharge Date: 09/21/2021     Concerns to be Addressed:       Patient plan of care discussed at interdisciplinary rounds: Yes    Anticipated Discharge Disposition:  Return home     Anticipated Discharge Services:  n/a  Anticipated Discharge DME:  n/a    Patient/family educated on Medicare website which has current facility and service quality ratings:  n/a  Education Provided on the Discharge Plan:  y  Patient/Family in Agreement with the Plan:  y    Referrals Placed by CM/SW:  n/a  Private pay costs discussed: Not applicable    Additional Information:  Pt to return home with no needs. Family to transport. CM to follow for post op medical progression.       GARRISON Villar

## 2021-09-20 NOTE — PROGRESS NOTES
General Surgery Progress Note:    Hospital Day # 4    ASSESSMENT:   1. Sepsis, due to unspecified organism, unspecified whether acute organ dysfunction present (H)    2. Acute cholecystitis        Ashok Garcia is a 80 year old male with sepsis with concerns of cholecystitis-currently no abdominal pain, fevers or leukocytosis  Significant cirrhosis with a meld of 21  Elevated LFTs    PLAN:   -Appreciate GI input and plans for ERCP/EUS today  -Diet per GI  -Currently no surgical intervention planned as patient is extremely high risk and currently no evidence of acute cholecystitis  -We will continue to follow the patient  -If there are concerns of possible cholecystitis during hospital stay then a HIDA scan without EF would be appropriate    SUBJECTIVE:   Ashok Garcia is feeling well this morning.  He denies any abdominal pain.  Denies nausea.  Is hungry since he has been n.p.o.  Most likely getting ERCP today.  Elevated LFTs are slightly improved today but still quite elevated.    Patient Vitals for the past 24 hrs:   BP Temp Temp src Pulse Resp SpO2   09/20/21 0731 (!) 155/93 97.5  F (36.4  C) Oral 74 17 93 %   09/19/21 2309 (!) 160/74 97.8  F (36.6  C) Oral 71 18 94 %   09/19/21 1936 137/72 97.5  F (36.4  C) Oral 80 20 93 %   09/19/21 1511 (!) 169/87 98.4  F (36.9  C) Oral 89 18 90 %       Physical Exam:  General: NAD, pleasant  CV:RRR  LUNGS:CTA bilaterally  ABD: Soft nontender no masses organomegaly  EXT:no CCE    Recent Results (from the past 24 hour(s))   Hepatic function panel    Collection Time: 09/20/21  6:12 AM   Result Value Ref Range    Bilirubin Total 5.8 (H) 0.0 - 1.0 mg/dL    Bilirubin Direct 3.1 (H) <=0.5 mg/dL    Protein Total 6.2 6.0 - 8.0 g/dL    Albumin 2.4 (L) 3.5 - 5.0 g/dL    Alkaline Phosphatase 326 (H) 45 - 120 U/L    AST 65 (H) 0 - 40 U/L    ALT 77 (H) 0 - 45 U/L   CBC with platelets    Collection Time: 09/20/21  6:12 AM   Result Value Ref Range    WBC Count 7.8 4.0 - 11.0  10e3/uL    RBC Count 3.35 (L) 4.40 - 5.90 10e6/uL    Hemoglobin 11.2 (L) 13.3 - 17.7 g/dL    Hematocrit 33.5 (L) 40.0 - 53.0 %     78 - 100 fL    MCH 33.4 (H) 26.5 - 33.0 pg    MCHC 33.4 31.5 - 36.5 g/dL    RDW 13.0 10.0 - 15.0 %    Platelet Count 184 150 - 450 10e3/uL   Basic metabolic panel    Collection Time: 09/20/21  6:12 AM   Result Value Ref Range    Sodium 137 136 - 145 mmol/L    Potassium 3.5 3.5 - 5.0 mmol/L    Chloride 105 98 - 107 mmol/L    Carbon Dioxide (CO2) 23 22 - 31 mmol/L    Anion Gap 9 5 - 18 mmol/L    Urea Nitrogen 17 8 - 28 mg/dL    Creatinine 0.93 0.70 - 1.30 mg/dL    Calcium 8.9 8.5 - 10.5 mg/dL    Glucose 109 70 - 125 mg/dL    GFR Estimate 76 >60 mL/min/1.73m2   Results for CINTHYA SOL (MRN [id number]) as of 9/20/2021 10:37   Ref. Range 9/18/2021 07:06 9/19/2021 08:20 9/20/2021 06:12   Albumin Latest Ref Range: 3.5 - 5.0 g/dL 2.6 (L) 2.7 (L) 2.4 (L)   Protein Total Latest Ref Range: 6.0 - 8.0 g/dL 6.7 6.9 6.2   Bilirubin Total Latest Ref Range: 0.0 - 1.0 mg/dL 8.3 (H) 7.9 (H) 5.8 (H)   Alkaline Phosphatase Latest Ref Range: 45 - 120 U/L 350 (H) 401 (H) 326 (H)   ALT Latest Ref Range: 0 - 45 U/L 95 (H) 102 (H) 77 (H)   AST Latest Ref Range: 0 - 40 U/L 116 (H) 106 (H) 65 (H)   Bilirubin Direct Latest Ref Range: <=0.5 mg/dL 4.8 (H) 4.2 (H) 3.1 (H)       Branden Escobar PA-C

## 2021-09-20 NOTE — PROGRESS NOTES
[facility]    Medicine Progress Note - Hospitalist Service       Date of Admission:  9/16/2021    Assessment & Plan           Ashok Garcia is a 80 year old male admitted on 9/16/2021. He presented after a fall and was found to be febrile and subsequently found to have biliary obstruction from sludge.     #Sepsis  #Possible acute cholecystitis? Or ascending cholangitis?  --- Patient presented after a fall found to be septic with fever, leukocytosis, elevated lactic acid  --- Ultrasound showed cholelithiasis, mildly positive Washington sign. CT showed pericholecystic soft tissue stranding and mild gallbladder wall thickening with numerous stones.  Bile ducts were not dilated.  Liver appeared cirrhotic.  --- Patient was started on IV Zosyn and sepsis has resolved  --- Patient was not felt to have a clinical exam terribly convincing for acute cholecystitis.  GI were concerned acute hepatitis could mimic cholecystitis imaging findings.  He was felt to be higher operative risk due to new diagnosis of liver cirrhosis.  --- General surgery was consulted and did initially recommend cholecystectomy however now less certain as patient continues to not have a positive Washington sign and has been tolerating an oral diet.  --- Liver enzymes remain elevated. GI took him for ERCP today and found stones and sludge in duct. Biliary sphincterotomy was performed, duct swept clear of stones/sludge.  --- Continue IV Zosyn  --- Clear liquid diet and reassess tomorrow  --- No anticoagulation for 72 hours  ---Typically would recommend elective cholecystectomy down the road but he is felt to be a higher than average operative risk.  --- Trend LFTs    #Elevated lft; new dx cirrhosis  --- Meld score 21  --- Appreciate GI note.  Continue to monitor LFTs daily.  ---trend lft  ---no bile duct dilation on US or CT, but alk phos continues to rise- now found to have sludge and bile duct  ---holding statin  --- Viral hepatitis  panel normal, ceruloplasmin low at 11, F-actin Ab negative, A1AT normal phenotype  --- Needs to quit alcohol     #Heart failure with preserved ejection fraction:  --- No evidence of exacerbation     --- Continue home Lasix and losartan    ---  Last echo August 3 showed EF of 55 to 60%.  --- Note; exercise tolerance decent.  Moderate risk undergo moderate risk procedure if surgery deemed necessary     #Hypertension  ---bp up a bit  --- Continue home Lasix and losartan  --- IV hydralazine as needed     #Persistent atrial fibrillation:   ---On Pradaxa, on hold with upcoming procedure(s).      #Coronary artery disease:  ---- No current evidence of exacerbation.   --- Not on beta-blocker.  --- Troponin normal. .     #Incidental finding of gynecomastia versus breast mass on imaging.  This can be followed up as outpatient.    #Mild macrocytic anemia, supports concerns about alcoholism       Diet: Clear Liquid Diet    DVT Prophylaxis: Moderate risk. Holding prophylaxis for imminent procedure   Martin Catheter: Not present  Central Lines: None  Code Status: Full Code      Disposition Plan   Expected discharge: 09/22/2021   recommended to prior living arrangement once  liver enzymes stabilized, diagnostic work-up complete     The patient's care was discussed with the Patient and Patient's Family.    Dora Figueroa MD  Hospitalist Service  [facility]  Text page via [employer] Paging/Directory      Clinically Significant Risk Factors Present on Admission               ____________        Physical Exam   Vital Signs: Temp: 97.6  F (36.4  C) Temp src: Oral BP: (!) 161/96 (rn notified) Pulse: 71   Resp: 18 SpO2: 97 % O2 Device: Nasal cannula Oxygen Delivery: 2 LPM  Weight: 224 lbs 0 oz  General: in no apparent distress, non-toxic and alert male lying in hospital bed oriented x3  HEENT: Head normocephalic atraumatic, oral mucosa moist. Sclerae anicteric  Skin: No rashes or lesions  Extremities: No peripheral  edema  Psych: Normal affect, mood euthymic  Neuro: CNII-XII grossly intact, moving all 4 extremities      Data   Recent Results (from the past 24 hour(s))   Hepatic function panel    Collection Time: 09/20/21  6:12 AM   Result Value Ref Range    Bilirubin Total 5.8 (H) 0.0 - 1.0 mg/dL    Bilirubin Direct 3.1 (H) <=0.5 mg/dL    Protein Total 6.2 6.0 - 8.0 g/dL    Albumin 2.4 (L) 3.5 - 5.0 g/dL    Alkaline Phosphatase 326 (H) 45 - 120 U/L    AST 65 (H) 0 - 40 U/L    ALT 77 (H) 0 - 45 U/L   CBC with platelets    Collection Time: 09/20/21  6:12 AM   Result Value Ref Range    WBC Count 7.8 4.0 - 11.0 10e3/uL    RBC Count 3.35 (L) 4.40 - 5.90 10e6/uL    Hemoglobin 11.2 (L) 13.3 - 17.7 g/dL    Hematocrit 33.5 (L) 40.0 - 53.0 %     78 - 100 fL    MCH 33.4 (H) 26.5 - 33.0 pg    MCHC 33.4 31.5 - 36.5 g/dL    RDW 13.0 10.0 - 15.0 %    Platelet Count 184 150 - 450 10e3/uL   Basic metabolic panel    Collection Time: 09/20/21  6:12 AM   Result Value Ref Range    Sodium 137 136 - 145 mmol/L    Potassium 3.5 3.5 - 5.0 mmol/L    Chloride 105 98 - 107 mmol/L    Carbon Dioxide (CO2) 23 22 - 31 mmol/L    Anion Gap 9 5 - 18 mmol/L    Urea Nitrogen 17 8 - 28 mg/dL    Creatinine 0.93 0.70 - 1.30 mg/dL    Calcium 8.9 8.5 - 10.5 mg/dL    Glucose 109 70 - 125 mg/dL    GFR Estimate 76 >60 mL/min/1.73m2   UPPER EUS    Collection Time: 09/20/21 12:09 PM   Result Value Ref Range    Upper EUS       [facility]  [address], [place] [address]  _______________________________________________________________________________  Patient Name: Ashok Glezzack        Procedure Date: 9/20/2021 12:09 PM  MRN: [id number]                       Account Number: [id number]  YOB: 1940              Admit Type: Inpatient  Age: 80                                Note Status: Finalized  Attending MD: Jarrell Alvarado MD        Instrument Name: EUS Linear Scope  [id number]  _______________________________________________________________________________     Procedure:           Upper EUS  Indications:         Elevated liver enzymes  Providers:           Jarrell Alvarado MD  Patient Profile:     This is an 80 year old male.  Referring MD:          Medicines:           General Anesthesia  Complications:       No immediate complications.  _______________________________________________________________________________  Procedure:           Pre-Anesthesia Assessment:                        - Prior to the procedure, a History and Physical was                        performed, and patient medications, allergies and                        sensitivities were reviewed. The patient's tolerance of                        previous anesthesia was reviewed.                       - The risks and benefits of the procedure and the                        sedation options and risks were discussed with the                        patient. All questions were answered and informed                        consent was obtained.                       - Patient identification and proposed procedure were                        verified prior to the procedure by the physician. The                        procedure was verified in the pre-procedure area.                       After obtaining informed consent, the endoscope was                        passed under direct vision. Throughout the procedure,                        the patient's blood pressure, pulse, and oxygen                         saturations were monitored continuously. The EUS linear                        scope was introduced through the mouth, and advanced to                        the second part of duodenum. The upper EUS was                        accomplished without difficulty. The patient tolerated                        the procedure well.                                                                                   Findings:        ENDOSONOGRAPHIC FINDING: :       Minimal hyperechoic material consistent with sludge was visualized        endosonographically in the common bile duct.       The celiac axis including lymph nodes was unremarkable.       The left adrenal was examined and normal.       The ampulla was normal endoscopically and endosonographically. The bile        duct ranged in size from 1.9 mm to 2.4 mm with minimal sludge.       No endosonographic evidence of mass lesions or pathologic lymph nodes.                                                                                    Moderate Sedation:       General Anesthesia  Impression:          - Hyperechoic material consistent with sludge was                        visualized endosonographically in the common bile duct.                       - No specimens collected.  Recommendation:      - Perform an ERCP today.                                                                                     Jarrell Alvarado MD  _______________  Jarrell Alvarado MD  9/20/2021 1:41:34 PM  I was physically present for the entire viewing portion of the exam.  __________________________  Signature of teaching physician  Delta/Tab Alvarado MD  Number of Addenda: 0    Note Initiated On: 9/20/2021 12:09 PM  Scope In: 12:43:06 PM  Scope Out: 12:49:35 PM     ENDOSCOPIC RETROGRADE CHOLANGIOPANCREATOGRAPHY    Collection Time: 09/20/21 12:52 PM   Result Value Ref Range    ERCP       [facility]  [address] [facility] [address] [facility] [place] [address]  _______________________________________________________________________________  Patient Name: Ashok Garcia        Procedure Date: 9/20/2021 12:52 PM  MRN: [id number]                       Account Number: [id number]  YOB: 1940              Admit Type: Inpatient  Age: 80                                Note Status: Finalized  Attending MD: Jarrell Alvarado MD        Instrument Name: ERCP Scope  [id number]  _______________________________________________________________________________     Procedure:           ERCP  Indications:         Bile duct stone(s)  Providers:           Jarrell Alvarado MD  Patient Profile:     This is an 80 year old male.  Referring MD:          Medicines:           General Anesthesia  Complications:       No immediate complications.  _______________________________________________________________________________  Procedure:           Pre-Anesthesia Assessment:                       -  Prior to the procedure, a History and Physical was                        performed, and patient medications, allergies and                        sensitivities were reviewed. The patient's tolerance of                        previous anesthesia was reviewed.                       - The risks and benefits of the procedure and the                        sedation options and risks were discussed with the                        patient. All questions were answered and informed                        consent was obtained.                       - Patient identification and proposed procedure were                        verified prior to the procedure by the physician. The                        procedure was verified in the pre-procedure area.                       After obtaining informed consent, the scope was passed                        under direct vision. Throughout the procedure, the                        patient's blood pressure, pulse, and oxygen saturations                         were monitored continuously. The ERCP scope was                        introduced through the mouth, and used to inject                        contrast into and used to inject contrast into the bile                        duct. The ERCP was accomplished without difficulty. The                        patient tolerated the procedure well.                                                                                    Findings:       The  film was normal. The esophagus was successfully intubated        under direct vision. The scope was advanced to a normal major papilla in        the descending duodenum without detailed examination of the pharynx,        larynx and associated structures, and upper GI tract. The upper GI tract        was grossly normal. The bile duct was deeply cannulated. Contrast was        injected. The biliary orifice was stenotic. This appeared benign. The        lower third of the main bile duct contained filling defect(s) thought to        be sludge. A wir e was passed into the biliary tree. A 7 mm biliary        sphincterotomy was made with a traction (standard) sphincterotome using        ERBE electrocautery. There was no post-sphincterotomy bleeding. The        biliary tree was swept with an 8.5 mm balloon starting at the        bifurcation. All stones were removed.                                                                                   Moderate Sedation:       General Aesthesia  Impression:          - Biliary papillary stenosis, benign.                       - The examination was suspicious for sludge.                       - Choledocholithiasis was found. Complete removal was                        accomplished by biliary sphincterotomy and balloon                        extraction.                       - A biliary sphincterotomy was performed.                       - The biliary tree was swept.  Recommendation:      - Return patient to [facility] for [facility] care.                       - No anticoagulation for 72 hour s.                       - Clear liquid diet for 24 hours.                       - If pain free after 24 hours advance diet slowly as                        tolerated.                                                                                     Jarrell Alvarado MD  _______________  Jarrell Alvarado MD  9/20/2021 1:44:49 PM  I was physically present for the entire  viewing portion of the exam.  __________________________  Signature of teaching physician  Delta/[id number] Alvarado MD  Number of Addenda: 0    Note Initiated On: 9/20/2021 12:52 PM  Scope In: 12:53:13 AM  Scope Out: 1:02:01 PM       ____________  Interval History   Data reviewed today: I reviewed all medications, new labs and imaging results over the last 24 hours. I personally reviewed no images or EKG's today.  Patient seen after ERCP. States he is doing fine. No pain. Understands plan for clear liquid diet and reassess tomorrow. Sons at bedside for my visit.

## 2021-09-20 NOTE — ANESTHESIA CARE TRANSFER NOTE
Patient: Ashok Garcia    Procedure(s):  ENDOSCOPIC ULTRASOUND  ENDOSCOPIC RETROGRADE CHOLANGIOPANCREATOGRAPHY, BILIARY SPHINCTEROTOMY STONE EXTRACTION.    Diagnosis: Sepsis, due to unspecified organism, unspecified whether acute organ dysfunction present (H) [A41.9]  Diagnosis Additional Information: No value filed.    Anesthesia Type:   General     Note:    Oropharynx: oropharynx clear of all foreign objects  Level of Consciousness: awake  Oxygen Supplementation: face mask  Level of Supplemental Oxygen (L/min / FiO2): 6  Independent Airway: airway patency satisfactory and stable  Dentition: dentition unchanged  Vital Signs Stable: post-procedure vital signs reviewed and stable  Report to RN Given: handoff report given  Patient transferred to: PACU    Handoff Report: Identifed the Patient, Identified the Reponsible Provider, Reviewed the pertinent medical history, Discussed the surgical course, Reviewed Intra-OP anesthesia mangement and issues during anesthesia, Set expectations for post-procedure period and Allowed opportunity for questions and acknowledgement of understanding      Vitals:  Vitals Value Taken Time   /89 09/20/21 1322   Temp 36.4  C (97.6  F) 09/20/21 1320   Pulse 111 09/20/21 1323   Resp 27 09/20/21 1323   SpO2 97 % 09/20/21 1323   Vitals shown include unvalidated device data.    Electronically Signed By: IRMA Case CRNA  September 20, 2021  1:24 PM

## 2021-09-20 NOTE — ANESTHESIA POSTPROCEDURE EVALUATION
Patient: Ashok Garcia    Procedure(s):  ENDOSCOPIC ULTRASOUND  ENDOSCOPIC RETROGRADE CHOLANGIOPANCREATOGRAPHY, BILIARY SPHINCTEROTOMY STONE EXTRACTION.    Diagnosis:Sepsis, due to unspecified organism, unspecified whether acute organ dysfunction present (H) [A41.9]  Diagnosis Additional Information: No value filed.    Anesthesia Type:  General    Note:  Disposition: Outpatient   Postop Pain Control: Uneventful            Sign Out: Well controlled pain   PONV: No   Neuro/Psych: Uneventful            Sign Out: Acceptable/Baseline neuro status   Airway/Respiratory: Uneventful            Sign Out: Acceptable/Baseline resp. status   CV/Hemodynamics: Uneventful            Sign Out: Acceptable CV status; No obvious hypovolemia; No obvious fluid overload   Other NRE:    DID A NON-ROUTINE EVENT OCCUR? No           Last vitals:  Vitals Value Taken Time   /88 09/20/21 1400   Temp 36.7  C (98  F) 09/20/21 1400   Pulse 78 09/20/21 1402   Resp 18 09/20/21 1402   SpO2 100 % 09/20/21 1402   Vitals shown include unvalidated device data.    Electronically Signed By: Sharda Ramirez MD  September 20, 2021  3:18 PM

## 2021-09-20 NOTE — PLAN OF CARE
Problem: Adult Inpatient Plan of Care  Goal: Absence of Hospital-Acquired Illness or Injury  Intervention: Identify and Manage Fall Risk  Recent Flowsheet Documentation  Taken 9/19/2021 1630 by Ilsa Rollins, RN  Safety Promotion/Fall Prevention:   bed alarm on   chair alarm on   safety round/check completed       Problem: Adult Inpatient Plan of Care  Goal: Plan of Care Review  Outcome: Improving  Flowsheets (Taken 9/19/2021 1906)  Plan of Care Reviewed With: patient  Progress: improving   Pt. Aware of being NPO after midnight tonight.     Ilsa Rollins, RN

## 2021-09-20 NOTE — H&P
GENERAL PRE-PROCEDURE:   Procedure:  EUS with possible ERCP   Date/Time:  9/20/2021 10:15 AM    Verbal consent obtained?: Yes    Written consent obtained?: Yes    Risks and benefits: Risks, benefits and alternatives were discussed    Consent given by:  Patient  Patient states understanding of procedure being performed: Yes    Patient's understanding of procedure matches consent: Yes    Procedure consent matches procedure scheduled: Yes    Expected level of sedation:  Deep  Appropriately NPO:  Yes  ASA Class:  3  Mallampati  :  Grade 2- soft palate, base of uvula, tonsillar pillars, and portion of posterior pharyngeal wall visible  Lungs:  Lungs clear with good breath sounds bilaterally  Heart:  Normal heart sounds and rate and systolic murmur  History & Physical reviewed:  History and physical reviewed and no updates needed  Statement of review:  I have reviewed the lab findings, diagnostic data, medications, and the plan for sedation

## 2021-09-20 NOTE — ANESTHESIA PREPROCEDURE EVALUATION
Anesthesia Pre-Procedure Evaluation    Patient: Ashok Garcia   MRN: [id number] : 1940        Preoperative Diagnosis: Sepsis, due to unspecified organism, unspecified whether acute organ dysfunction present (H) [A41.9]   Procedure : Procedure(s):  ESOPHAGOGASTRODUODENOSCOPY, WITH ENDOSCOPIC ULTRASOUND  ENDOSCOPIC RETROGRADE CHOLANGIOPANCREATOGRAPHY     Past Medical History:   Diagnosis Date     CAD (coronary artery disease)      CAD (coronary artery disease)      Esophageal reflux      Esophageal reflux      Hyperlipidemia      Hyperlipidemia      Hypertension      Hypertension       Past Surgical History:   Procedure Laterality Date     C CABG, VEIN, SINGLE      Description: CABG (CABG);  Proc Date: 2008;  Comments: LIMA to LAD, SVG to diagonal and OM, radial artery to OM      Allergies   Allergen Reactions     Nuts - Unspecified [Nuts] Unknown      Social History     Tobacco Use     Smoking status: Never Smoker     Smokeless tobacco: Never Used   Substance Use Topics     Alcohol use: Never      Wt Readings from Last 1 Encounters:   21 101.6 kg (224 lb)        Anesthesia Evaluation            ROS/MED HX  ENT/Pulmonary:       Neurologic:       Cardiovascular:     (+) hypertension--CAD ---CHF     METS/Exercise Tolerance:     Hematologic:       Musculoskeletal:       GI/Hepatic:     (+) GERD,     Renal/Genitourinary:       Endo:     (+) Obesity,     Psychiatric/Substance Use:       Infectious Disease:       Malignancy:       Other:            Physical Exam    Airway        Mallampati: II    Neck ROM: full     Respiratory Devices and Support         Dental  no notable dental history         Cardiovascular   cardiovascular exam normal          Pulmonary   pulmonary exam normal                OUTSIDE LABS:  CBC:   Lab Results   Component Value Date    WBC 7.8 2021    WBC 11.7 (H) 2021    HGB 11.2 (L) 2021    HGB 11.7 (L) 2021    HCT 33.5 (L) 2021    HCT 35.9 (L)  09/18/2021     09/20/2021     09/18/2021     BMP:   Lab Results   Component Value Date     09/20/2021     09/18/2021    POTASSIUM 3.5 09/20/2021    POTASSIUM 3.8 09/18/2021    CHLORIDE 105 09/20/2021    CHLORIDE 106 09/18/2021    CO2 23 09/20/2021    CO2 20 (L) 09/18/2021    BUN 17 09/20/2021    BUN 18 09/18/2021    CR 0.93 09/20/2021    CR 0.78 09/18/2021     09/20/2021     09/18/2021     COAGS:   Lab Results   Component Value Date    INR 1.35 (H) 09/18/2021     POC: No results found for: BGM, HCG, HCGS  HEPATIC:   Lab Results   Component Value Date    ALBUMIN 2.4 (L) 09/20/2021    PROTTOTAL 6.2 09/20/2021    ALT 77 (H) 09/20/2021    AST 65 (H) 09/20/2021    ALKPHOS 326 (H) 09/20/2021    BILITOTAL 5.8 (H) 09/20/2021     OTHER:   Lab Results   Component Value Date    LACT 0.8 09/16/2021    A1C 5.2 11/08/2011    SILVIO 8.9 09/20/2021    MAG 2.0 07/07/2021    LIPASE 105 (H) 09/17/2021    TSH 2.69 06/15/2018    CRP 23.4 (H) 09/17/2021       Anesthesia Plan    ASA Status:  2      Anesthesia Type: General.     - Airway: ETT              Consents    Anesthesia Plan(s) and associated risks, benefits, and realistic alternatives discussed. Questions answered and patient/representative(s) expressed understanding.     - Discussed with:  Patient         Postoperative Care            Comments:                Shanon Hassan MD

## 2021-09-21 ENCOUNTER — APPOINTMENT (OUTPATIENT)
Dept: PHYSICAL THERAPY | Facility: HOSPITAL | Age: 81
DRG: 854 | End: 2021-09-21
Payer: MEDICARE

## 2021-09-21 ENCOUNTER — APPOINTMENT (OUTPATIENT)
Dept: OCCUPATIONAL THERAPY | Facility: HOSPITAL | Age: 81
DRG: 854 | End: 2021-09-21
Payer: MEDICARE

## 2021-09-21 LAB
ALBUMIN SERPL-MCNC: 2.7 G/DL (ref 3.5–5)
ALP SERPL-CCNC: 323 U/L (ref 45–120)
ALT SERPL W P-5'-P-CCNC: 71 U/L (ref 0–45)
ANION GAP SERPL CALCULATED.3IONS-SCNC: 10 MMOL/L (ref 5–18)
AST SERPL W P-5'-P-CCNC: 46 U/L (ref 0–40)
BACTERIA BLD CULT: NO GROWTH
BACTERIA BLD CULT: NO GROWTH
BILIRUB DIRECT SERPL-MCNC: 2.3 MG/DL
BILIRUB SERPL-MCNC: 4.1 MG/DL (ref 0–1)
BUN SERPL-MCNC: 15 MG/DL (ref 8–28)
CALCIUM SERPL-MCNC: 9.2 MG/DL (ref 8.5–10.5)
CHLORIDE BLD-SCNC: 103 MMOL/L (ref 98–107)
CO2 SERPL-SCNC: 24 MMOL/L (ref 22–31)
CREAT SERPL-MCNC: 0.91 MG/DL (ref 0.7–1.3)
ERYTHROCYTE [DISTWIDTH] IN BLOOD BY AUTOMATED COUNT: 13.2 % (ref 10–15)
GFR SERPL CREATININE-BSD FRML MDRD: 78 ML/MIN/1.73M2
GLUCOSE BLD-MCNC: 135 MG/DL (ref 70–125)
HCT VFR BLD AUTO: 35.7 % (ref 40–53)
HGB BLD-MCNC: 11.9 G/DL (ref 13.3–17.7)
MCH RBC QN AUTO: 33.9 PG (ref 26.5–33)
MCHC RBC AUTO-ENTMCNC: 33.3 G/DL (ref 31.5–36.5)
MCV RBC AUTO: 102 FL (ref 78–100)
MITOCHONDRIA M2 IGG SER-ACNC: 1.6 U/ML
PLATELET # BLD AUTO: 199 10E3/UL (ref 150–450)
POTASSIUM BLD-SCNC: 3.7 MMOL/L (ref 3.5–5)
PROT SERPL-MCNC: 6.9 G/DL (ref 6–8)
RBC # BLD AUTO: 3.51 10E6/UL (ref 4.4–5.9)
SODIUM SERPL-SCNC: 137 MMOL/L (ref 136–145)
WBC # BLD AUTO: 6.8 10E3/UL (ref 4–11)

## 2021-09-21 PROCEDURE — 97535 SELF CARE MNGMENT TRAINING: CPT | Mod: GO

## 2021-09-21 PROCEDURE — 250N000011 HC RX IP 250 OP 636: Performed by: INTERNAL MEDICINE

## 2021-09-21 PROCEDURE — 36415 COLL VENOUS BLD VENIPUNCTURE: CPT | Performed by: HOSPITALIST

## 2021-09-21 PROCEDURE — 82248 BILIRUBIN DIRECT: CPT | Performed by: HOSPITALIST

## 2021-09-21 PROCEDURE — 120N000001 HC R&B MED SURG/OB

## 2021-09-21 PROCEDURE — 80053 COMPREHEN METABOLIC PANEL: CPT | Performed by: HOSPITALIST

## 2021-09-21 PROCEDURE — 99233 SBSQ HOSP IP/OBS HIGH 50: CPT | Performed by: HOSPITALIST

## 2021-09-21 PROCEDURE — 97110 THERAPEUTIC EXERCISES: CPT | Mod: GO

## 2021-09-21 PROCEDURE — 97116 GAIT TRAINING THERAPY: CPT | Mod: GP

## 2021-09-21 PROCEDURE — 250N000013 HC RX MED GY IP 250 OP 250 PS 637: Performed by: INTERNAL MEDICINE

## 2021-09-21 PROCEDURE — 85027 COMPLETE CBC AUTOMATED: CPT | Performed by: HOSPITALIST

## 2021-09-21 PROCEDURE — 99231 SBSQ HOSP IP/OBS SF/LOW 25: CPT | Mod: 57 | Performed by: SURGERY

## 2021-09-21 RX ADMIN — PIPERACILLIN AND TAZOBACTAM 3.38 G: 3; .375 INJECTION, POWDER, FOR SOLUTION INTRAVENOUS at 09:33

## 2021-09-21 RX ADMIN — Medication 400 MCG: at 12:55

## 2021-09-21 RX ADMIN — PIPERACILLIN AND TAZOBACTAM 3.38 G: 3; .375 INJECTION, POWDER, FOR SOLUTION INTRAVENOUS at 16:31

## 2021-09-21 RX ADMIN — FUROSEMIDE 40 MG: 20 TABLET ORAL at 12:55

## 2021-09-21 RX ADMIN — LOSARTAN POTASSIUM 50 MG: 50 TABLET, FILM COATED ORAL at 12:55

## 2021-09-21 RX ADMIN — PIPERACILLIN AND TAZOBACTAM 3.38 G: 3; .375 INJECTION, POWDER, FOR SOLUTION INTRAVENOUS at 00:20

## 2021-09-21 NOTE — PLAN OF CARE
Problem: Adult Inpatient Plan of Care  Goal: Plan of Care Review  Outcome: No Change     Problem: Risk for Delirium  Goal: Improved Sleep  Outcome: No Change     Patient denies pain or discomfort.  Denies nausea, no emesis.  IV zosyn overnight.  Did have some confusion when woken up in the middle of the night but redirectable and was better after pt was reoriented.

## 2021-09-21 NOTE — PROGRESS NOTES
"  Gastroenterology Progress Note     Subjective   The patient did well overnight.  He denies any abdominal pain and has not had any nausea or vomiting.  No acute events overnight.     Objective     Vitals Blood pressure (!) 158/84, pulse 65, temperature 97  F (36.1  C), temperature source Axillary, resp. rate 20, height 1.854 m (6' 1\"), weight 101.6 kg (224 lb), SpO2 97 %.          Physical Exam   General: awake, alert, oriented times three, icteric albeit improving    Cardiovascular: RRR, lower extremity edema    Chest: lungs are clear to auscultation bilaterally    Abdomen: soft, non-tender, non-distended, bowel sounds present    Neurologic: grossly intact, moves all four extremities         Laboratory     Electrolytes    Recent Labs   Lab 09/21/21  0614 09/20/21  0612 09/18/21  0706    137 138   POTASSIUM 3.7 3.5 3.8   CHLORIDE 103 105 106   CO2 24 23 20*   * 109 103   CR 0.91 0.93 0.78   BUN 15 17 18      Hematology    Recent Labs   Lab 09/21/21  0614 09/20/21  0612 09/18/21  0706 09/17/21  0850 09/17/21  0641   HGB 11.9* 11.2* 11.7*  --    < >   * 100 103*  --    < >   WBC 6.8 7.8 11.7*  --    < >    184 188  --    < >   INR  --   --  1.35* 1.53*  --     < > = values in this interval not displayed.      LFTs & Lipase    Recent Labs   Lab 09/21/21  0614 09/20/21  0612 09/19/21  0820 09/18/21  0706 09/17/21  0850   AST 46* 65* 106*   < >  --    ALT 71* 77* 102*   < >  --    ALKPHOS 323* 326* 401*   < >  --    BILITOTAL 4.1* 5.8* 7.9*   < >  --    LIPASE  --   --   --   --  105*    < > = values in this interval not displayed.     Imaging Studies         I have reviewed the current diagnostic and laboratory tests.           Impression    Ashok Garcia is a pleasant 80 year old male with a history of heavy alcohol use who presented to the hospital with elevated liver function test.  He underwent endoscopic ultrasound with endoscopic retrograde cholangiopancreatography on September 20, " 2021 and was found to have a small amount of sludge in the duct.  His liver tests have now improved.     Recommendations      Elevated LFTs   Likely multifactorial in nature including alcoholic hepatitis and sludge in the bile duct.  Given the improvement of his liver function tests it would be reasonable to proceed with laparoscopic cholecystectomy.  Would continue to follow LFTs to normalization.  I would highly recommend that the patient discontinue all alcohol use.  If the patient's liver function tests do not continue to normalize liver biopsy could be considered.           Jarrell Avlarado MD  Thank you for the opportunity to participate in the care of this patient.   Please feel free to call me with any questions or concerns.  Phone number [phone].

## 2021-09-21 NOTE — PLAN OF CARE
Uneventful shift.  No acute changes.    Tolerating full liquid diet.  NPO effective at midnight for Laparoscopic Cholecystectomy with Dr Elizondo.  Scheduled ~0900 on Wed. 9/22.    Up walking in halls x2 with staff/therapy.    Jeb denies any abdomen pain or nausea.     Last reported BM: 9/18.  He is not in discomfort/reports not abnormal for his not to go for several days.  Declines intervention at this time.    Remains on IV Zosyn.    Johana Mathew RN

## 2021-09-21 NOTE — PROGRESS NOTES
[facility]    Medicine Progress Note - Hospitalist Service       Date of Admission:  9/16/2021    Assessment & Plan           Ashok Garcia is a 80 year old male admitted on 9/16/2021. He presented after a fall and was found to be febrile and subsequently found to have biliary obstruction from sludge.     #Sepsis  #Possible acute cholecystitis? Or ascending cholangitis?  --- Patient presented after a fall found to be septic with fever, leukocytosis, elevated lactic acid  --- Ultrasound showed cholelithiasis, mildly positive [place] sign. CT showed pericholecystic soft tissue stranding and mild gallbladder wall thickening with numerous stones.  Bile ducts were not dilated.  Liver appeared cirrhotic.  --- Patient was started on IV Zosyn and sepsis has resolved  --- Patient was not felt to have a clinical exam terribly convincing for acute cholecystitis.  GI were concerned acute hepatitis could mimic cholecystitis imaging findings.  He was felt to be higher operative risk due to new diagnosis of liver cirrhosis.  --- Liver enzymes remain elevated. GI took him for ERCP 9/20 and found stones and sludge in duct. Biliary sphincterotomy was performed, duct swept clear of stones/sludge.  --- General surgery was consulted and did initially recommend cholecystectomy however this plan was suspended pending ERCP result.  Since ERCP showed sludge in his bile duct, general surgery do recommend cholecystectomy which is planned for tomorrow.  --- Continue IV Zosyn  --- Full liquid diet and NPO after midnight  --- No anticoagulation for 72 hours from 9/20 and postop until cleared by surgeon  --- Trend LFTs    #Elevated lft; new dx cirrhosis  --- Meld score 21  ---no bile duct dilation on US or CT, but found to have sludge in bile duct  ---trend lft. Stable despite sludge clearance yesterday. GI recommend continue to follow liver enzymes and if they do not eventually normalized and he may need liver  biopsy.  ---holding statin  --- Viral hepatitis panel normal, ceruloplasmin low at 11, F-actin Ab negative, A1AT normal phenotype  --- Needs to quit alcohol, has said he will     #Heart failure with preserved ejection fraction:  --- No evidence of exacerbation     --- Continue home Lasix and losartan  ---  Last echo August 3 showed EF of 55 to 60%.  --- Note; exercise tolerance decent.  Moderate risk undergo moderate risk procedure if surgery deemed necessary     #Hypertension  --- Continue home Lasix and losartan  --- IV hydralazine as needed     #Persistent atrial fibrillation:   ---On Pradaxa, on hold with upcoming procedure(s).      #Coronary artery disease:  ---- No current evidence of exacerbation.   --- Not on beta-blocker.  --- Troponin normal. .     #Incidental finding of gynecomastia versus breast mass on imaging.  This can be followed up as outpatient.    #Mild macrocytic anemia, supports concerns about alcoholism    #Transient disorientation  This morning when he woke from sleep he was confused and did not know where he was, quickly was reoriented by a nurse and now alert and oriented x3.  He was distressed by this episode but is not terribly concerning.  Continue to monitor.       Diet: Full Liquid Diet  NPO per Anesthesia Guidelines for Procedure/Surgery Except for: Meds    DVT Prophylaxis: Moderate risk. Holding prophylaxis for imminent procedure   Martin Catheter: Not present  Central Lines: None  Code Status: Full Code      Disposition Plan   Expected discharge: 09/22/2021   recommended to prior living arrangement once recovered from surgery     The patient's care was discussed with the Patient.    Dora Figueroa MD  Hospitalist Service  [facility]  Text page via [employer] Paging/Directory      Clinically Significant Risk Factors Present on Admission                ____________        Physical Exam   Vital Signs: Temp: 97.5  F (36.4  C) Temp src: Oral BP: 125/57 Pulse: 63   Resp:  20 SpO2: 94 % O2 Device: None (Room air) Oxygen Delivery: 1 LPM  Weight: 224 lbs 0 oz  General: in no apparent distress, non-toxic and alert male sitting in bedside chair oriented x3  HEENT: Head normocephalic atraumatic, oral mucosa moist. Sclerae anicteric  Skin: No rashes or lesions  Extremities: No peripheral edema  Psych: Normal affect, mood euthymic  Neuro: CNII-XII grossly intact, moving all 4 extremities      Data   Recent Results (from the past 24 hour(s))   Hepatic panel    Collection Time: 09/21/21  6:14 AM   Result Value Ref Range    Bilirubin Total 4.1 (H) 0.0 - 1.0 mg/dL    Bilirubin Direct 2.3 (H) <=0.5 mg/dL    Protein Total 6.9 6.0 - 8.0 g/dL    Albumin 2.7 (L) 3.5 - 5.0 g/dL    Alkaline Phosphatase 323 (H) 45 - 120 U/L    AST 46 (H) 0 - 40 U/L    ALT 71 (H) 0 - 45 U/L   Basic metabolic panel    Collection Time: 09/21/21  6:14 AM   Result Value Ref Range    Sodium 137 136 - 145 mmol/L    Potassium 3.7 3.5 - 5.0 mmol/L    Chloride 103 98 - 107 mmol/L    Carbon Dioxide (CO2) 24 22 - 31 mmol/L    Anion Gap 10 5 - 18 mmol/L    Urea Nitrogen 15 8 - 28 mg/dL    Creatinine 0.91 0.70 - 1.30 mg/dL    Calcium 9.2 8.5 - 10.5 mg/dL    Glucose 135 (H) 70 - 125 mg/dL    GFR Estimate 78 >60 mL/min/1.73m2   CBC with platelets    Collection Time: 09/21/21  6:14 AM   Result Value Ref Range    WBC Count 6.8 4.0 - 11.0 10e3/uL    RBC Count 3.51 (L) 4.40 - 5.90 10e6/uL    Hemoglobin 11.9 (L) 13.3 - 17.7 g/dL    Hematocrit 35.7 (L) 40.0 - 53.0 %     (H) 78 - 100 fL    MCH 33.9 (H) 26.5 - 33.0 pg    MCHC 33.3 31.5 - 36.5 g/dL    RDW 13.2 10.0 - 15.0 %    Platelet Count 199 150 - 450 10e3/uL     ____________  Interval History   Data reviewed today: I reviewed all medications, new labs and imaging results over the last 24 hours. I personally reviewed no images or EKG's today.  patient no complaints today. potential cholecystectomy planned later today. labs stable, bilirubin coming down slightly. patient was worried  because he was a little disoriented when he woke up this morning, he now seems alert and oriented, reassurance provided. patient will let us know if his son wants a call from me today.

## 2021-09-21 NOTE — PROGRESS NOTES
General Surgery Progress Note  Hospital Day # 5    Subjective:   Pt is feeling better he is in no acute distress at this time.  He status post his ERCP yesterday where they cleared some sludge from his common bile duct.    Vitals:    09/20/21 1935 09/21/21 0004 09/21/21 0447 09/21/21 0745   BP: 134/82 (!) 141/85 (!) 162/99 (!) 158/84   BP Location: Right arm Right arm Right arm Right arm   Pulse: 79 64 82 65   Resp: 18 16 20 20   Temp: 97.7  F (36.5  C) 97.6  F (36.4  C) 97.3  F (36.3  C) 97  F (36.1  C)   TempSrc: Oral Oral Oral Axillary   SpO2: 91% 95% 98% 97%   Weight:       Height:           Physical Exam:  Lungs:  CTA  CV:       RRR  Ab:       Soft, + BS,     Recent Labs   Lab 09/21/21  0614   WBC 6.8   HGB 11.9*   HCT 35.7*          Recent Labs   Lab 09/21/21  0614      CO2 24   BUN 15   ALBUMIN 2.7*   ALKPHOS 323*   ALT 71*   AST 46*       Assessment:  Pt is stable and despite clearing the common bile duct of some sludge it still unclear what the source of the patient's atypical liver function studies are.  With the gallstones and the sludge within the common bile duct I do believe the patient would benefit from having his gallbladder removed.    Plan: Laparoscopic cholecystectomy this afternoon if we can work him into the operating room if not then tomorrow morning.    Bernardo Elizondo MD  [facility]  [phone]

## 2021-09-21 NOTE — PLAN OF CARE
NURSING NOTE  0700 - 1500    Problem: Adult Inpatient Plan of Care  Goal: Plan of Care Review  Outcome: Improving    D: VSS, afebrile, BPs not meeting parameters for Hydralazine PRN. Denies      of pain/nausea. Tolerating clear liquids. No flatus, faint hypoactive bowel      sounds. Abdomen is round, non-distended.  A: Due meds given. Purposeful rounding done. Incontinent pad changed -       x1 heavily soaked. Encouraged activity - refused to walk/be up on chair.  R: No acute issues. Will continue with plan of care.

## 2021-09-22 ENCOUNTER — ANESTHESIA (OUTPATIENT)
Dept: SURGERY | Facility: HOSPITAL | Age: 81
DRG: 854 | End: 2021-09-22
Payer: MEDICARE

## 2021-09-22 ENCOUNTER — ANESTHESIA EVENT (OUTPATIENT)
Dept: SURGERY | Facility: HOSPITAL | Age: 81
DRG: 854 | End: 2021-09-22
Payer: MEDICARE

## 2021-09-22 LAB — GLUCOSE BLDC GLUCOMTR-MCNC: 156 MG/DL (ref 70–99)

## 2021-09-22 PROCEDURE — 250N000011 HC RX IP 250 OP 636: Performed by: SURGERY

## 2021-09-22 PROCEDURE — 250N000011 HC RX IP 250 OP 636: Performed by: NURSE ANESTHETIST, CERTIFIED REGISTERED

## 2021-09-22 PROCEDURE — 47001 NDL BIOPSY LVR TM OTH MAJ PX: CPT | Mod: 59 | Performed by: SURGERY

## 2021-09-22 PROCEDURE — 370N000017 HC ANESTHESIA TECHNICAL FEE, PER MIN: Performed by: SURGERY

## 2021-09-22 PROCEDURE — 250N000011 HC RX IP 250 OP 636: Performed by: INTERNAL MEDICINE

## 2021-09-22 PROCEDURE — 258N000003 HC RX IP 258 OP 636: Performed by: SURGERY

## 2021-09-22 PROCEDURE — 258N000003 HC RX IP 258 OP 636: Performed by: NURSE ANESTHETIST, CERTIFIED REGISTERED

## 2021-09-22 PROCEDURE — 250N000011 HC RX IP 250 OP 636: Performed by: ANESTHESIOLOGY

## 2021-09-22 PROCEDURE — 99233 SBSQ HOSP IP/OBS HIGH 50: CPT | Performed by: HOSPITALIST

## 2021-09-22 PROCEDURE — 272N000001 HC OR GENERAL SUPPLY STERILE: Performed by: SURGERY

## 2021-09-22 PROCEDURE — 250N000025 HC SEVOFLURANE, PER MIN: Performed by: SURGERY

## 2021-09-22 PROCEDURE — 258N000001 HC RX 258: Performed by: SURGERY

## 2021-09-22 PROCEDURE — 0FT44ZZ RESECTION OF GALLBLADDER, PERCUTANEOUS ENDOSCOPIC APPROACH: ICD-10-PCS | Performed by: SURGERY

## 2021-09-22 PROCEDURE — 120N000001 HC R&B MED SURG/OB

## 2021-09-22 PROCEDURE — 710N000009 HC RECOVERY PHASE 1, LEVEL 1, PER MIN: Performed by: SURGERY

## 2021-09-22 PROCEDURE — 47562 LAPAROSCOPIC CHOLECYSTECTOMY: CPT | Performed by: SURGERY

## 2021-09-22 PROCEDURE — 250N000013 HC RX MED GY IP 250 OP 250 PS 637: Performed by: SURGERY

## 2021-09-22 PROCEDURE — 250N000009 HC RX 250: Performed by: NURSE ANESTHETIST, CERTIFIED REGISTERED

## 2021-09-22 PROCEDURE — 999N000141 HC STATISTIC PRE-PROCEDURE NURSING ASSESSMENT: Performed by: SURGERY

## 2021-09-22 PROCEDURE — 0FB10ZX EXCISION OF RIGHT LOBE LIVER, OPEN APPROACH, DIAGNOSTIC: ICD-10-PCS | Performed by: SURGERY

## 2021-09-22 PROCEDURE — 88304 TISSUE EXAM BY PATHOLOGIST: CPT | Mod: TC | Performed by: SURGERY

## 2021-09-22 PROCEDURE — 360N000076 HC SURGERY LEVEL 3, PER MIN: Performed by: SURGERY

## 2021-09-22 RX ORDER — HYDROMORPHONE HCL IN WATER/PF 6 MG/30 ML
0.4 PATIENT CONTROLLED ANALGESIA SYRINGE INTRAVENOUS
Status: DISCONTINUED | OUTPATIENT
Start: 2021-09-22 | End: 2021-09-24 | Stop reason: HOSPADM

## 2021-09-22 RX ORDER — KETAMINE HYDROCHLORIDE 50 MG/ML
INJECTION, SOLUTION INTRAMUSCULAR; INTRAVENOUS PRN
Status: DISCONTINUED | OUTPATIENT
Start: 2021-09-22 | End: 2021-09-22

## 2021-09-22 RX ORDER — SODIUM CHLORIDE 9 MG/ML
INJECTION, SOLUTION INTRAVENOUS CONTINUOUS PRN
Status: DISCONTINUED | OUTPATIENT
Start: 2021-09-22 | End: 2021-09-22

## 2021-09-22 RX ORDER — FAMOTIDINE 20 MG/1
20 TABLET, FILM COATED ORAL 2 TIMES DAILY
Status: DISCONTINUED | OUTPATIENT
Start: 2021-09-22 | End: 2021-09-22

## 2021-09-22 RX ORDER — CEFAZOLIN SODIUM 2 G/100ML
2 INJECTION, SOLUTION INTRAVENOUS
Status: DISCONTINUED | OUTPATIENT
Start: 2021-09-22 | End: 2021-09-24 | Stop reason: CLARIF

## 2021-09-22 RX ORDER — ONDANSETRON 2 MG/ML
INJECTION INTRAMUSCULAR; INTRAVENOUS PRN
Status: DISCONTINUED | OUTPATIENT
Start: 2021-09-22 | End: 2021-09-22

## 2021-09-22 RX ORDER — AMOXICILLIN 250 MG
1 CAPSULE ORAL 2 TIMES DAILY
Status: DISCONTINUED | OUTPATIENT
Start: 2021-09-22 | End: 2021-09-24 | Stop reason: HOSPADM

## 2021-09-22 RX ORDER — HYDROMORPHONE HCL IN WATER/PF 6 MG/30 ML
0.4 PATIENT CONTROLLED ANALGESIA SYRINGE INTRAVENOUS EVERY 5 MIN PRN
Status: DISCONTINUED | OUTPATIENT
Start: 2021-09-22 | End: 2021-09-22 | Stop reason: HOSPADM

## 2021-09-22 RX ORDER — PROPOFOL 10 MG/ML
INJECTION, EMULSION INTRAVENOUS PRN
Status: DISCONTINUED | OUTPATIENT
Start: 2021-09-22 | End: 2021-09-22

## 2021-09-22 RX ORDER — HALOPERIDOL 5 MG/ML
1 INJECTION INTRAMUSCULAR
Status: DISCONTINUED | OUTPATIENT
Start: 2021-09-22 | End: 2021-09-22 | Stop reason: HOSPADM

## 2021-09-22 RX ORDER — ONDANSETRON 2 MG/ML
4 INJECTION INTRAMUSCULAR; INTRAVENOUS EVERY 6 HOURS PRN
Status: DISCONTINUED | OUTPATIENT
Start: 2021-09-22 | End: 2021-09-24 | Stop reason: HOSPADM

## 2021-09-22 RX ORDER — HYDROMORPHONE HYDROCHLORIDE 1 MG/ML
.5-1 INJECTION, SOLUTION INTRAMUSCULAR; INTRAVENOUS; SUBCUTANEOUS
Status: DISCONTINUED | OUTPATIENT
Start: 2021-09-22 | End: 2021-09-24 | Stop reason: HOSPADM

## 2021-09-22 RX ORDER — FENTANYL CITRATE 50 UG/ML
INJECTION, SOLUTION INTRAMUSCULAR; INTRAVENOUS PRN
Status: DISCONTINUED | OUTPATIENT
Start: 2021-09-22 | End: 2021-09-22

## 2021-09-22 RX ORDER — PROCHLORPERAZINE MALEATE 5 MG
5 TABLET ORAL EVERY 6 HOURS PRN
Status: DISCONTINUED | OUTPATIENT
Start: 2021-09-22 | End: 2021-09-24 | Stop reason: HOSPADM

## 2021-09-22 RX ORDER — ALBUTEROL SULFATE 0.83 MG/ML
2.5 SOLUTION RESPIRATORY (INHALATION) EVERY 4 HOURS PRN
Status: DISCONTINUED | OUTPATIENT
Start: 2021-09-22 | End: 2021-09-22 | Stop reason: HOSPADM

## 2021-09-22 RX ORDER — CEFAZOLIN SODIUM 2 G/100ML
2 INJECTION, SOLUTION INTRAVENOUS
Status: DISCONTINUED | OUTPATIENT
Start: 2021-09-22 | End: 2021-09-22

## 2021-09-22 RX ORDER — SODIUM CHLORIDE, SODIUM LACTATE, POTASSIUM CHLORIDE, AND CALCIUM CHLORIDE .6; .31; .03; .02 G/100ML; G/100ML; G/100ML; G/100ML
IRRIGANT IRRIGATION PRN
Status: DISCONTINUED | OUTPATIENT
Start: 2021-09-22 | End: 2021-09-22 | Stop reason: HOSPADM

## 2021-09-22 RX ORDER — FENTANYL CITRATE 50 UG/ML
50 INJECTION, SOLUTION INTRAMUSCULAR; INTRAVENOUS EVERY 5 MIN PRN
Status: DISCONTINUED | OUTPATIENT
Start: 2021-09-22 | End: 2021-09-22 | Stop reason: HOSPADM

## 2021-09-22 RX ORDER — DIPHENHYDRAMINE HYDROCHLORIDE 50 MG/ML
12.5 INJECTION INTRAMUSCULAR; INTRAVENOUS EVERY 6 HOURS PRN
Status: DISCONTINUED | OUTPATIENT
Start: 2021-09-22 | End: 2021-09-24 | Stop reason: HOSPADM

## 2021-09-22 RX ORDER — LIDOCAINE HYDROCHLORIDE 20 MG/ML
INJECTION, SOLUTION INFILTRATION; PERINEURAL PRN
Status: DISCONTINUED | OUTPATIENT
Start: 2021-09-22 | End: 2021-09-22

## 2021-09-22 RX ORDER — KETOROLAC TROMETHAMINE 15 MG/ML
15 INJECTION, SOLUTION INTRAMUSCULAR; INTRAVENOUS EVERY 6 HOURS PRN
Status: DISCONTINUED | OUTPATIENT
Start: 2021-09-22 | End: 2021-09-22 | Stop reason: HOSPADM

## 2021-09-22 RX ORDER — KETOROLAC TROMETHAMINE 15 MG/ML
15 INJECTION, SOLUTION INTRAMUSCULAR; INTRAVENOUS EVERY 6 HOURS
Status: DISCONTINUED | OUTPATIENT
Start: 2021-09-22 | End: 2021-09-23

## 2021-09-22 RX ORDER — BISACODYL 10 MG
10 SUPPOSITORY, RECTAL RECTAL DAILY PRN
Status: DISCONTINUED | OUTPATIENT
Start: 2021-09-22 | End: 2021-09-24 | Stop reason: HOSPADM

## 2021-09-22 RX ORDER — SODIUM CHLORIDE, SODIUM LACTATE, POTASSIUM CHLORIDE, CALCIUM CHLORIDE 600; 310; 30; 20 MG/100ML; MG/100ML; MG/100ML; MG/100ML
INJECTION, SOLUTION INTRAVENOUS CONTINUOUS PRN
Status: DISCONTINUED | OUTPATIENT
Start: 2021-09-22 | End: 2021-09-22

## 2021-09-22 RX ORDER — FENTANYL CITRATE 50 UG/ML
50 INJECTION, SOLUTION INTRAMUSCULAR; INTRAVENOUS
Status: DISCONTINUED | OUTPATIENT
Start: 2021-09-22 | End: 2021-09-22 | Stop reason: HOSPADM

## 2021-09-22 RX ORDER — OXYCODONE HYDROCHLORIDE 5 MG/1
5 TABLET ORAL EVERY 4 HOURS PRN
Status: DISCONTINUED | OUTPATIENT
Start: 2021-09-22 | End: 2021-09-22 | Stop reason: HOSPADM

## 2021-09-22 RX ORDER — CEFAZOLIN SODIUM 2 G/100ML
2 INJECTION, SOLUTION INTRAVENOUS SEE ADMIN INSTRUCTIONS
Status: DISCONTINUED | OUTPATIENT
Start: 2021-09-22 | End: 2021-09-22

## 2021-09-22 RX ORDER — POLYETHYLENE GLYCOL 3350 17 G/17G
17 POWDER, FOR SOLUTION ORAL DAILY
Status: DISCONTINUED | OUTPATIENT
Start: 2021-09-23 | End: 2021-09-24 | Stop reason: HOSPADM

## 2021-09-22 RX ORDER — DEXTROSE MONOHYDRATE, SODIUM CHLORIDE, AND POTASSIUM CHLORIDE 50; 1.49; 4.5 G/1000ML; G/1000ML; G/1000ML
INJECTION, SOLUTION INTRAVENOUS CONTINUOUS
Status: DISCONTINUED | OUTPATIENT
Start: 2021-09-22 | End: 2021-09-24 | Stop reason: HOSPADM

## 2021-09-22 RX ORDER — ONDANSETRON 4 MG/1
4 TABLET, ORALLY DISINTEGRATING ORAL EVERY 6 HOURS PRN
Status: DISCONTINUED | OUTPATIENT
Start: 2021-09-22 | End: 2021-09-24 | Stop reason: HOSPADM

## 2021-09-22 RX ORDER — DIPHENHYDRAMINE HCL 12.5MG/5ML
12.5 LIQUID (ML) ORAL EVERY 6 HOURS PRN
Status: DISCONTINUED | OUTPATIENT
Start: 2021-09-22 | End: 2021-09-24 | Stop reason: HOSPADM

## 2021-09-22 RX ORDER — ONDANSETRON 2 MG/ML
4 INJECTION INTRAMUSCULAR; INTRAVENOUS EVERY 30 MIN PRN
Status: DISCONTINUED | OUTPATIENT
Start: 2021-09-22 | End: 2021-09-22 | Stop reason: HOSPADM

## 2021-09-22 RX ORDER — MEPERIDINE HYDROCHLORIDE 25 MG/ML
12.5 INJECTION INTRAMUSCULAR; INTRAVENOUS; SUBCUTANEOUS
Status: DISCONTINUED | OUTPATIENT
Start: 2021-09-22 | End: 2021-09-22 | Stop reason: HOSPADM

## 2021-09-22 RX ORDER — SODIUM CHLORIDE, SODIUM LACTATE, POTASSIUM CHLORIDE, CALCIUM CHLORIDE 600; 310; 30; 20 MG/100ML; MG/100ML; MG/100ML; MG/100ML
INJECTION, SOLUTION INTRAVENOUS CONTINUOUS
Status: DISCONTINUED | OUTPATIENT
Start: 2021-09-22 | End: 2021-09-22 | Stop reason: HOSPADM

## 2021-09-22 RX ORDER — DEXAMETHASONE SODIUM PHOSPHATE 4 MG/ML
INJECTION, SOLUTION INTRA-ARTICULAR; INTRALESIONAL; INTRAMUSCULAR; INTRAVENOUS; SOFT TISSUE PRN
Status: DISCONTINUED | OUTPATIENT
Start: 2021-09-22 | End: 2021-09-22

## 2021-09-22 RX ORDER — ONDANSETRON 4 MG/1
4 TABLET, ORALLY DISINTEGRATING ORAL EVERY 30 MIN PRN
Status: DISCONTINUED | OUTPATIENT
Start: 2021-09-22 | End: 2021-09-22 | Stop reason: HOSPADM

## 2021-09-22 RX ORDER — OXYCODONE HYDROCHLORIDE 5 MG/1
5 TABLET ORAL EVERY 4 HOURS PRN
Status: DISCONTINUED | OUTPATIENT
Start: 2021-09-22 | End: 2021-09-24 | Stop reason: HOSPADM

## 2021-09-22 RX ORDER — CEFAZOLIN SODIUM 2 G/100ML
2 INJECTION, SOLUTION INTRAVENOUS SEE ADMIN INSTRUCTIONS
Status: DISCONTINUED | OUTPATIENT
Start: 2021-09-22 | End: 2021-09-24 | Stop reason: CLARIF

## 2021-09-22 RX ORDER — FAMOTIDINE 20 MG/1
20 TABLET, FILM COATED ORAL EVERY 12 HOURS
Status: DISCONTINUED | OUTPATIENT
Start: 2021-09-22 | End: 2021-09-24 | Stop reason: HOSPADM

## 2021-09-22 RX ADMIN — DEXAMETHASONE SODIUM PHOSPHATE 4 MG: 4 INJECTION, SOLUTION INTRA-ARTICULAR; INTRALESIONAL; INTRAMUSCULAR; INTRAVENOUS; SOFT TISSUE at 09:42

## 2021-09-22 RX ADMIN — PHENYLEPHRINE HYDROCHLORIDE 100 MCG: 10 INJECTION INTRAVENOUS at 10:03

## 2021-09-22 RX ADMIN — KETOROLAC TROMETHAMINE 15 MG: 15 INJECTION, SOLUTION INTRAMUSCULAR; INTRAVENOUS at 21:06

## 2021-09-22 RX ADMIN — LIDOCAINE HYDROCHLORIDE 60 MG: 20 INJECTION, SOLUTION INFILTRATION; PERINEURAL at 09:42

## 2021-09-22 RX ADMIN — FENTANYL CITRATE 50 MCG: 50 INJECTION, SOLUTION INTRAMUSCULAR; INTRAVENOUS at 11:39

## 2021-09-22 RX ADMIN — SODIUM CHLORIDE, POTASSIUM CHLORIDE, SODIUM LACTATE AND CALCIUM CHLORIDE: 600; 310; 30; 20 INJECTION, SOLUTION INTRAVENOUS at 11:05

## 2021-09-22 RX ADMIN — FENTANYL CITRATE 50 MCG: 50 INJECTION, SOLUTION INTRAMUSCULAR; INTRAVENOUS at 11:48

## 2021-09-22 RX ADMIN — PIPERACILLIN AND TAZOBACTAM 3.38 G: 3; .375 INJECTION, POWDER, FOR SOLUTION INTRAVENOUS at 00:57

## 2021-09-22 RX ADMIN — FAMOTIDINE 20 MG: 20 TABLET, FILM COATED ORAL at 20:15

## 2021-09-22 RX ADMIN — POTASSIUM CHLORIDE, DEXTROSE MONOHYDRATE AND SODIUM CHLORIDE: 150; 5; 450 INJECTION, SOLUTION INTRAVENOUS at 14:08

## 2021-09-22 RX ADMIN — SUGAMMADEX 200 MG: 100 INJECTION, SOLUTION INTRAVENOUS at 11:11

## 2021-09-22 RX ADMIN — ROCURONIUM BROMIDE 40 MG: 50 INJECTION, SOLUTION INTRAVENOUS at 09:42

## 2021-09-22 RX ADMIN — PROPOFOL 50 MG: 10 INJECTION, EMULSION INTRAVENOUS at 09:42

## 2021-09-22 RX ADMIN — DOCUSATE SODIUM 50 MG AND SENNOSIDES 8.6 MG 1 TABLET: 8.6; 5 TABLET, FILM COATED ORAL at 20:15

## 2021-09-22 RX ADMIN — FENTANYL CITRATE 50 MCG: 50 INJECTION, SOLUTION INTRAMUSCULAR; INTRAVENOUS at 11:20

## 2021-09-22 RX ADMIN — HYDROMORPHONE HYDROCHLORIDE 0.5 MG: 1 INJECTION, SOLUTION INTRAMUSCULAR; INTRAVENOUS; SUBCUTANEOUS at 14:08

## 2021-09-22 RX ADMIN — KETAMINE HYDROCHLORIDE 50 MG: 50 INJECTION, SOLUTION INTRAMUSCULAR; INTRAVENOUS at 09:42

## 2021-09-22 RX ADMIN — PHENYLEPHRINE HYDROCHLORIDE 100 MCG: 10 INJECTION INTRAVENOUS at 10:04

## 2021-09-22 RX ADMIN — KETOROLAC TROMETHAMINE 15 MG: 15 INJECTION, SOLUTION INTRAMUSCULAR; INTRAVENOUS at 16:24

## 2021-09-22 RX ADMIN — PIPERACILLIN AND TAZOBACTAM 3.38 G: 3; .375 INJECTION, POWDER, FOR SOLUTION INTRAVENOUS at 16:24

## 2021-09-22 RX ADMIN — PIPERACILLIN AND TAZOBACTAM 3.38 G: 3; .375 INJECTION, POWDER, FOR SOLUTION INTRAVENOUS at 09:30

## 2021-09-22 RX ADMIN — HYDROMORPHONE HYDROCHLORIDE 0.5 MG: 1 INJECTION, SOLUTION INTRAMUSCULAR; INTRAVENOUS; SUBCUTANEOUS at 22:43

## 2021-09-22 RX ADMIN — FENTANYL CITRATE 50 MCG: 50 INJECTION, SOLUTION INTRAMUSCULAR; INTRAVENOUS at 09:33

## 2021-09-22 RX ADMIN — HYDROMORPHONE HYDROCHLORIDE 0.4 MG: 0.2 INJECTION, SOLUTION INTRAMUSCULAR; INTRAVENOUS; SUBCUTANEOUS at 12:02

## 2021-09-22 RX ADMIN — SODIUM CHLORIDE: 9 INJECTION, SOLUTION INTRAVENOUS at 09:23

## 2021-09-22 RX ADMIN — ONDANSETRON 4 MG: 2 INJECTION INTRAMUSCULAR; INTRAVENOUS at 10:12

## 2021-09-22 NOTE — ANESTHESIA PREPROCEDURE EVALUATION
Anesthesia Pre-Procedure Evaluation    Patient: Ashok Garcia   MRN: [id number] : 1940        Preoperative Diagnosis: Acute cholecystitis [K81.0]  Calculus of gallbladder with chronic cholecystitis without obstruction [K80.10]   Procedure : Procedure(s):  CHOLECYSTECTOMY, LAPAROSCOPIC     Past Medical History:   Diagnosis Date     CAD (coronary artery disease)      CAD (coronary artery disease)      Esophageal reflux      Esophageal reflux      Hyperlipidemia      Hyperlipidemia      Hypertension      Hypertension       Past Surgical History:   Procedure Laterality Date     C CABG, VEIN, SINGLE      Description: CABG (CABG);  Proc Date: 2008;  Comments: LIMA to LAD, SVG to diagonal and OM, radial artery to OM     ENDOSCOPIC RETROGRADE CHOLANGIOPANCREATOGRAM N/A 2021    Procedure: ENDOSCOPIC RETROGRADE CHOLANGIOPANCREATOGRAPHY, BILIARY SPHINCTEROTOMY STONE EXTRACTION.;  Surgeon: Jarrell Alvarado MD;  Location: [facility] [place] OR     ESOPHAGOSCOPY, GASTROSCOPY, DUODENOSCOPY (EGD), COMBINED N/A 2021    Procedure: ENDOSCOPIC ULTRASOUND;  Surgeon: Jarrell Alvarado MD;  Location: [facility] [place] OR      Allergies   Allergen Reactions     Nuts - Unspecified [Nuts] Unknown      Social History     Tobacco Use     Smoking status: Never Smoker     Smokeless tobacco: Never Used   Substance Use Topics     Alcohol use: Never      Wt Readings from Last 1 Encounters:   21 101.6 kg (224 lb)        Anesthesia Evaluation            ROS/MED HX  ENT/Pulmonary:       Neurologic:       Cardiovascular:     (+) hypertension--CAD ---CHF     METS/Exercise Tolerance:     Hematologic:       Musculoskeletal:       GI/Hepatic:     (+) GERD,     Renal/Genitourinary:       Endo:     (+) Obesity,     Psychiatric/Substance Use:       Infectious Disease:       Malignancy:       Other:            Physical Exam    Airway        Mallampati: II    Neck ROM: full     Respiratory Devices and Support         Dental  no  notable dental history         Cardiovascular   cardiovascular exam normal          Pulmonary   pulmonary exam normal                OUTSIDE LABS:  CBC:   Lab Results   Component Value Date    WBC 6.8 09/21/2021    WBC 7.8 09/20/2021    HGB 11.9 (L) 09/21/2021    HGB 11.2 (L) 09/20/2021    HCT 35.7 (L) 09/21/2021    HCT 33.5 (L) 09/20/2021     09/21/2021     09/20/2021     BMP:   Lab Results   Component Value Date     09/21/2021     09/20/2021    POTASSIUM 3.7 09/21/2021    POTASSIUM 3.5 09/20/2021    CHLORIDE 103 09/21/2021    CHLORIDE 105 09/20/2021    CO2 24 09/21/2021    CO2 23 09/20/2021    BUN 15 09/21/2021    BUN 17 09/20/2021    CR 0.91 09/21/2021    CR 0.93 09/20/2021     (H) 09/21/2021     09/20/2021     COAGS:   Lab Results   Component Value Date    INR 1.35 (H) 09/18/2021     POC: No results found for: BGM, HCG, HCGS  HEPATIC:   Lab Results   Component Value Date    ALBUMIN 2.7 (L) 09/21/2021    PROTTOTAL 6.9 09/21/2021    ALT 71 (H) 09/21/2021    AST 46 (H) 09/21/2021    ALKPHOS 323 (H) 09/21/2021    BILITOTAL 4.1 (H) 09/21/2021     OTHER:   Lab Results   Component Value Date    LACT 0.8 09/16/2021    A1C 5.2 11/08/2011    SILVIO 9.2 09/21/2021    MAG 2.0 07/07/2021    LIPASE 105 (H) 09/17/2021    TSH 2.69 06/15/2018    CRP 23.4 (H) 09/17/2021       Anesthesia Plan    ASA Status:  2      Anesthesia Type: General.     - Airway: ETT              Consents    Anesthesia Plan(s) and associated risks, benefits, and realistic alternatives discussed. Questions answered and patient/representative(s) expressed understanding.     - Discussed with:  Patient         Postoperative Care            Comments:                Shanon Hassan MD

## 2021-09-22 NOTE — PROGRESS NOTES
"  Gastroenterology Progress Note     Subjective   The patient feels quite well this morning.  He is going to have a cholecystectomy later this morning.  No acute events overnight tolerating oral intake.     Objective     Vitals Blood pressure 133/62, pulse 62, temperature 97.5  F (36.4  C), temperature source Oral, resp. rate 20, height 1.854 m (6' 1\"), weight 101.6 kg (224 lb), SpO2 96 %.          Physical Exam   General: awake, alert, oriented times three, icteric improving    Cardiovascular: RRR, lower extremity edema    Chest: lungs are clear to auscultation bilaterally    Abdomen: soft, non-tender, non-distended, bowel sounds present    Neurologic: grossly intact, moves all four extremities         Laboratory     Electrolytes    Recent Labs   Lab 09/21/21  0614 09/20/21  0612 09/18/21  0706    137 138   POTASSIUM 3.7 3.5 3.8   CHLORIDE 103 105 106   CO2 24 23 20*   * 109 103   CR 0.91 0.93 0.78   BUN 15 17 18      Hematology    Recent Labs   Lab 09/21/21  0614 09/20/21  0612 09/18/21  0706 09/17/21  0850 09/17/21  0641   HGB 11.9* 11.2* 11.7*  --    < >   * 100 103*  --    < >   WBC 6.8 7.8 11.7*  --    < >    184 188  --    < >   INR  --   --  1.35* 1.53*  --     < > = values in this interval not displayed.      LFTs & Lipase    Recent Labs   Lab 09/21/21  0614 09/20/21  0612 09/19/21  0820 09/18/21  0706 09/17/21  0850   AST 46* 65* 106*   < >  --    ALT 71* 77* 102*   < >  --    ALKPHOS 323* 326* 401*   < >  --    BILITOTAL 4.1* 5.8* 7.9*   < >  --    LIPASE  --   --   --   --  105*    < > = values in this interval not displayed.     Imaging Studies         I have reviewed the current diagnostic and laboratory tests.           Impression    Ashok Garcia is a pleasant 80 year old male with a history of heavy alcohol use who presented to the hospital with elevated liver function test.  He underwent endoscopic ultrasound with endoscopic retrograde cholangiopancreatography on " September 20, 2021 and was found to have a small amount of sludge in the duct.  His liver tests have now improved.     Recommendations      Elevated LFTs   Likely multifactorial in nature including alcoholic hepatitis and sludge in the bile duct.  Given the improvement of his liver function tests it would be reasonable to proceed with laparoscopic cholecystectomy.  Would continue to follow LFTs to normalization.  I would highly recommend that the patient discontinue all alcohol use.  If liver tests have not completely normalized liver biopsy could be considered.     GASTROENTEROLOGY SIGN OFF      The gastroenterology service will sign off at this time. Please call us with any questions or concerns related to the care of this patient.             Jarrell Alvarado MD  Thank you for the opportunity to participate in the care of this patient.   Please feel free to call me with any questions or concerns.  Phone number [phone].

## 2021-09-22 NOTE — PROGRESS NOTES
[facility]    Medicine Progress Note - Hospitalist Service       Date of Admission:  9/16/2021    Assessment & Plan           Ashok Garcia is a 80 year old male admitted on 9/16/2021. He presented after a fall and was found to be febrile and subsequently found to have biliary obstruction from sludge.     #Sepsis  #Acute on chronic cholecystitis  --- Patient presented after a fall found to be septic with fever, leukocytosis, elevated lactic acid  --- Ultrasound showed cholelithiasis, mildly positive [place] sign. CT showed pericholecystic soft tissue stranding and mild gallbladder wall thickening with numerous stones.  Bile ducts were not dilated.  Liver appeared cirrhotic.  --- Patient was started on IV Zosyn and sepsis has resolved  --- Patient was not felt to have a clinical exam terribly convincing for acute cholecystitis.  GI were concerned acute hepatitis could mimic cholecystitis imaging findings.  He was felt to be higher operative risk due to new diagnosis of liver cirrhosis. Labs were monitored for a few days.  --- Liver enzymes remained elevated. GI took him for ERCP 9/20 and found stones and sludge in duct. Biliary sphincterotomy was performed, duct swept clear of stones/sludge.  --- General surgery was consulted and did initially recommend cholecystectomy however this plan was suspended pending ERCP result.  Since ERCP showed sludge in his bile duct, general surgery did recommend cholecystectomy which was completed earlier today. Intraop GB looked to have acute on chronic inflammation.  --- Continue IV Zosyn. Would continue abx a few more days as documented there was some bile spillage intraop.  --- Clear liquid diet. Advancement per surgeon  --- No anticoagulation for 72 hours from 9/20 and postop until cleared by surgeon  --- Trend LFTs    #Elevated lft; new dx cirrhosis  --- Meld score 21  ---no bile duct dilation on US or CT, but found to have sludge in bile  duct  ---trend lft. Stably elevated despite sludge clearance. GI recommend continue to follow liver enzymes to normalization.  ---holding statin  --- Viral hepatitis panel normal, ceruloplasmin low at 11, F-actin Ab negative, A1AT normal phenotype  --- Needs to quit alcohol, has said he will  --- Liver biopsy obtained intraop today, await result, can be followed up as outpatient     #Heart failure with preserved ejection fraction:  --- No evidence of exacerbation     --- Continue home Lasix and losartan  ---  Last echo August 3 showed EF of 55 to 60%.     #Hypertension  --- Continue home Lasix and losartan  --- IV hydralazine as needed     #Persistent atrial fibrillation:   ---On Pradaxa, on hold due to procedure(s).      #Coronary artery disease:  ---- No current evidence of exacerbation.   --- Not on beta-blocker.  --- Troponin normal.     #Incidental finding of gynecomastia versus breast mass on imaging.  This can be followed up as outpatient.    #Mild macrocytic anemia, supports concerns about alcoholism    #Transient disorientation  #Deconditioning  He is overall doing well but PT and OT did recommend someone to stay with him at home for first few days, family aware and are going to do this  Home care PT/OT also recommended    #Postop pulmonary insufficiency  Likely due to sedation  Wean O2 as tolerated       Diet: NPO per Anesthesia Guidelines for Procedure/Surgery Except for: Meds  Advance Diet as Tolerated: Clear Liquid Diet  Advance Diet as Tolerated: Full Liquid Diet    DVT Prophylaxis: Moderate risk. Holding prophylaxis for imminent procedure   Martin Catheter: Not present  Central Lines: None  Code Status: Full Code      Disposition Plan   Expected discharge: 09/23/2021   recommended to prior living arrangement with home care PT/OT once recovered from surgery     The patient's care was discussed with the Patient and Patient's Family.    Dora Figueroa MD  Hospitalist [facility]  Hospital  Text page via [facility] Paging/Directory      Clinically Significant Risk Factors Present on Admission                ____________        Physical Exam   Vital Signs: Temp: 96.8  F (36  C) Temp src: Axillary BP: (!) 155/78 Pulse: 60   Resp: 18 SpO2: 97 % O2 Device: Nasal cannula Oxygen Delivery: 2 LPM  Weight: 224 lbs 0 oz  General: in no apparent distress, non-toxic and alert male sitting in bedside chair oriented x3  HEENT: Head normocephalic atraumatic, oral mucosa moist. Sclerae anicteric  Skin: No rashes or lesions  Extremities: No peripheral edema  Psych: Normal affect, mood euthymic  Neuro: CNII-XII grossly intact, moving all 4 extremities      Data   No new labs today  ____________  Interval History   Data reviewed today: I reviewed all medications, new labs and imaging results over the last 24 hours. I personally reviewed no images or EKG's today.  Just back from PACU when I stop by. Complains of pain otherwise doing ok.  Son updated at bedside. States he and other 2 kids planning to make a schedule so someone always with patient first few days back home

## 2021-09-22 NOTE — ANESTHESIA CARE TRANSFER NOTE
Patient: sAhok Garcia    Procedure(s):  CHOLECYSTECTOMY, LAPAROSCOPIC WITH LIVER BIOPSY    Diagnosis: Acute cholecystitis [K81.0]  Calculus of gallbladder with chronic cholecystitis without obstruction [K80.10]  Diagnosis Additional Information: No value filed.    Anesthesia Type:   General     Note:    Oropharynx: oropharynx clear of all foreign objects and spontaneously breathing  Level of Consciousness: drowsy  Oxygen Supplementation: face mask  Level of Supplemental Oxygen (L/min / FiO2): 6  Independent Airway: airway patency satisfactory and stable  Dentition: dentition unchanged  Vital Signs Stable: post-procedure vital signs reviewed and stable  Report to RN Given: handoff report given  Patient transferred to: PACU    Handoff Report: Identifed the Patient, Identified the Reponsible Provider, Reviewed the pertinent medical history, Discussed the surgical course, Reviewed Intra-OP anesthesia mangement and issues during anesthesia, Set expectations for post-procedure period and Allowed opportunity for questions and acknowledgement of understanding      Vitals:  Vitals Value Taken Time   /93 09/22/21 1123   Temp 36.5  C (97.7  F) 09/22/21 1122   Pulse 59 09/22/21 1126   Resp 35 09/22/21 1126   SpO2 98 % 09/22/21 1126   Vitals shown include unvalidated device data.    Electronically Signed By: IRMA Buenrostro CRNA  September 22, 2021  11:27 AM

## 2021-09-22 NOTE — OP NOTE
Operative Note:  Laparoscopic Cholecystectomy    Name:  Ashok MAEVE Garcia  PCP:  Stef Sims  Procedure Date:  9/16/2021 - 9/22/2021      Procedure(s):  CHOLECYSTECTOMY, LAPAROSCOPIC WITH LIVER BIOPSY     Pre-Procedure Diagnosis:  Cholecystitis    Post-Procedure Diagnosis:    Cholecystits, acute on chronic inflammation    Surgeon(s):  Bernardo Elizondo MD      Anesthesia Type:  GET      Findings:  Distended gallbladder with signs of chronic inflammation was full of gallstones    Operative Report:    The patient was taken to Operating Room, identified, and the procedure verified as Laparoscopic Cholecystectomy  A Time Out was held.    General endotracheal anesthesia was administered and tolerated well. After the induction, the abdomen was prepped in the usual sterile fashion. The patient was positioned in the supine position. They were sterilely prepped and draped in the usual surgical fashion.    Local anesthetic agent was injected into the skin near along the midclavicular line of the right subcostal margin.  A 5 mm trochars advanced in through an incision made at this site with the Optiview trocar and a 0 degree laparoscope.  Pneumoperitoneum was brought up to 15 mmHg once the trocar was in the abdomen.  With this trocar in place under direct visualization I was able to place 2 5's and a  11 mm port was placed under direct vision.  All skin incisions were infiltrated with a local anesthetic agent before making the incision and placing the trocars.     The gallbladder was identified and omental adhesions were up and around the gallbladder these were peeled down and away from the gallbladder with use of electrocautery and with blunt dissection., the fundus grasped and retracted cephalad up over the inferior edge of the liver. The infundibulum was grasped and retracted laterally, exposing the neck of the gallbladder.  The visceral peritoneum overlying the angle of Calot was dissected through with  hook cautery. The cystic duct was clearly identified and bluntly dissected circumferentially. The junctions of the gallbladder and the cystic duct was clearly identified.  The cystic duct was clipped with one clip on the gallbladder side and 3 extra-large manual clips more proximally on the cystic duct. The Cystic duct was divided sharply with laparoscopic scissors between the clips. The cystic artery was identified, it was dissected free of surrounding structures.  The cystic artery was clipped and divided.      The gallbladder was dissected from the liver bed in retrograde fashion with the electrocautery.  There was some spillage of biliary content from a hole made in thin gallbladder wall down near the neck of the gallbladder.  The stones that spilled from the gallbladder were cleaned up and removed with a stone grasper.  The gallbladder was removed by placing it in a endocatch bag and extracting it from through the 11 mm trocar site.   The fascia of the 10 mm trocar site was then closed with 0 vicryl using an Endo fascial closure device under direct physician laparoscope.    The liver has been an ongoing question as to his degree of cirrhosis.  There was evidence of cirrhosis or at least liver damage apparent.  The liver was biopsied with a needle core biopsy system in 2 cores of tissue were taken from the right lobe of the liver under direct visualization.  As the biopsy needle was removed from the liver these biopsy sites were addressed with electrocautery and complete hemostasis was achieved.  The operative field was irrigated effluent was aspirated until all was clean and dry and I was certain all stones that had spilled had been removed.    I did advance a 19 Silver drain into the intra-abdominal cavity it comes out through the right lateral 5 mm trocar site and it courses up along the right side underneath the liver underneath the gallbladder fossa and over the top of the stomach.  That drain was sutured to  the skin with a 2-0 silk.     Pneumoperitoneum was reduced.  The trocars were removed.  The skin was then closed with 4-0 monocryl and a sterile dressing was applied.    Instrument, sponge, and needle counts were correct at closure and at the conclusion of the case.    Estimated Blood Loss:   30 cc    Specimens:    Gall Bladder       Drains:   None    Complications:    None    Bernardo Elizondo MD     Date: 9/22/2021  Time: 11:17 AM

## 2021-09-22 NOTE — ANESTHESIA POSTPROCEDURE EVALUATION
Patient: Ashok Garcia    Procedure(s):  CHOLECYSTECTOMY, LAPAROSCOPIC WITH LIVER BIOPSY    Diagnosis:Acute cholecystitis [K81.0]  Calculus of gallbladder with chronic cholecystitis without obstruction [K80.10]  Diagnosis Additional Information: No value filed.    Anesthesia Type:  General    Note:  Disposition: Outpatient   Postop Pain Control: Uneventful            Sign Out: Well controlled pain   PONV: No   Neuro/Psych: Uneventful            Sign Out: Acceptable/Baseline neuro status   Airway/Respiratory: Uneventful            Sign Out: Acceptable/Baseline resp. status   CV/Hemodynamics: Uneventful            Sign Out: Acceptable CV status; No obvious hypovolemia; No obvious fluid overload   Other NRE: NONE   DID A NON-ROUTINE EVENT OCCUR? No           Last vitals:  Vitals Value Taken Time   /93 09/22/21 1123   Temp 36.5  C (97.7  F) 09/22/21 1122   Pulse 59 09/22/21 1126   Resp 35 09/22/21 1126   SpO2 98 % 09/22/21 1126   Vitals shown include unvalidated device data.    Electronically Signed By: Shanon Hassan MD  September 22, 2021  11:27 AM

## 2021-09-22 NOTE — PLAN OF CARE
Denies discomfort overnight.  Sleeping between cares.  Some disorientation upon waking, reorients quickly.  NPO for anticipated lap traci this morning.

## 2021-09-22 NOTE — ANESTHESIA PROCEDURE NOTES
Airway       Patient location during procedure: OR       Procedure Start/Stop Times: 9/22/2021 9:46 AM  Staff -        Anesthesiologist:  Shanon Hassan MD       CRNA: Ganesh Villatoro APRN CRNA       Performed By: CRNA  Consent for Airway        Urgency: elective  Indications and Patient Condition       Indications for airway management: cass-procedural       Induction type:intravenous       Mask difficulty assessment: 1 - vent by mask    Final Airway Details       Final airway type: endotracheal airway       Successful airway: ETT - single  Endotracheal Airway Details        ETT size (mm): 7.5       Cuffed: yes       Successful intubation technique: direct laryngoscopy       DL Blade Type: Smith 2       Adjucts: stylet and tooth guard       Position: Right       Measured from: lips       Secured at (cm): 24    Post intubation assessment        Placement verified by: capnometry, equal breath sounds and chest rise        Number of attempts at approach: 1       Number of other approaches attempted: 0       Secured with: silk tape       Ease of procedure: easy       Dentition: Intact and Unchanged

## 2021-09-22 NOTE — PLAN OF CARE
Problem: Adult Inpatient Plan of Care  Goal: Optimal Comfort and Wellbeing  Outcome: Improving   Patient denies pain or discomfort, alert and oriented, vitals within normal limits, had good fluid intake for super, was up to the bathroom with assist or one using a walker, has to be NPO after midnight for Lap Jennifer except meds, no verbal complaints.

## 2021-09-22 NOTE — PLAN OF CARE
Occupational Therapy Discharge Summary    Reason for therapy discharge:    Discharged to home.    Progress towards therapy goal(s). See goals on Care Plan in [facility] electronic health record for goal details.  Goals partially met.  Barriers to achieving goals:   discharge from facility.    Therapy recommendation(s):    Pt declined TCU.  Recommend home care and family support

## 2021-09-22 NOTE — PLAN OF CARE
Pt returned from surgery around 1300 this afternoon.  Pt drowsy but easily arousable and able to answer questions appropriately.  Pt reported abdominal pain 8/10 and received 0.5mg of IV hydromorphone. He was noted to be sleeping afterwards.  Attempted to wean the pt off O2 but his O2 sat dipped to 88% on RA. He was then at 91% on 1L so he was placed back on 2L and sats are 98%.  Pt's 2 sons present and updated.   Lap sites C,D,I.

## 2021-09-23 ENCOUNTER — APPOINTMENT (OUTPATIENT)
Dept: PHYSICAL THERAPY | Facility: HOSPITAL | Age: 81
DRG: 854 | End: 2021-09-23
Payer: MEDICARE

## 2021-09-23 LAB
ALBUMIN SERPL-MCNC: 2.8 G/DL (ref 3.5–5)
ALP SERPL-CCNC: 228 U/L (ref 45–120)
ALT SERPL W P-5'-P-CCNC: 77 U/L (ref 0–45)
ANION GAP SERPL CALCULATED.3IONS-SCNC: 6 MMOL/L (ref 5–18)
AST SERPL W P-5'-P-CCNC: 53 U/L (ref 0–40)
BASOPHILS # BLD AUTO: 0 10E3/UL (ref 0–0.2)
BASOPHILS NFR BLD AUTO: 0 %
BILIRUB SERPL-MCNC: 3.3 MG/DL (ref 0–1)
BUN SERPL-MCNC: 17 MG/DL (ref 8–28)
CALCIUM SERPL-MCNC: 9.2 MG/DL (ref 8.5–10.5)
CHLORIDE BLD-SCNC: 106 MMOL/L (ref 98–107)
CO2 SERPL-SCNC: 27 MMOL/L (ref 22–31)
CREAT SERPL-MCNC: 0.98 MG/DL (ref 0.7–1.3)
EOSINOPHIL # BLD AUTO: 0 10E3/UL (ref 0–0.7)
EOSINOPHIL NFR BLD AUTO: 0 %
ERYTHROCYTE [DISTWIDTH] IN BLOOD BY AUTOMATED COUNT: 13.7 % (ref 10–15)
GFR SERPL CREATININE-BSD FRML MDRD: 72 ML/MIN/1.73M2
GLUCOSE BLD-MCNC: 128 MG/DL (ref 70–125)
GLUCOSE BLDC GLUCOMTR-MCNC: 123 MG/DL (ref 70–99)
GLUCOSE BLDC GLUCOMTR-MCNC: 137 MG/DL (ref 70–99)
GLUCOSE BLDC GLUCOMTR-MCNC: 93 MG/DL (ref 70–99)
HCT VFR BLD AUTO: 38.3 % (ref 40–53)
HGB BLD-MCNC: 12 G/DL (ref 13.3–17.7)
IMM GRANULOCYTES # BLD: 0.1 10E3/UL
IMM GRANULOCYTES NFR BLD: 1 %
LYMPHOCYTES # BLD AUTO: 0.6 10E3/UL (ref 0.8–5.3)
LYMPHOCYTES NFR BLD AUTO: 7 %
MCH RBC QN AUTO: 33.9 PG (ref 26.5–33)
MCHC RBC AUTO-ENTMCNC: 31.3 G/DL (ref 31.5–36.5)
MCV RBC AUTO: 108 FL (ref 78–100)
MONOCYTES # BLD AUTO: 0.6 10E3/UL (ref 0–1.3)
MONOCYTES NFR BLD AUTO: 7 %
NEUTROPHILS # BLD AUTO: 7.5 10E3/UL (ref 1.6–8.3)
NEUTROPHILS NFR BLD AUTO: 85 %
NRBC # BLD AUTO: 0 10E3/UL
NRBC BLD AUTO-RTO: 0 /100
PATH REPORT.COMMENTS IMP SPEC: NORMAL
PATH REPORT.COMMENTS IMP SPEC: NORMAL
PATH REPORT.FINAL DX SPEC: NORMAL
PATH REPORT.GROSS SPEC: NORMAL
PATH REPORT.MICROSCOPIC SPEC OTHER STN: NORMAL
PATH REPORT.RELEVANT HX SPEC: NORMAL
PHOTO IMAGE: NORMAL
PLATELET # BLD AUTO: 241 10E3/UL (ref 150–450)
POTASSIUM BLD-SCNC: 4.1 MMOL/L (ref 3.5–5)
PROT SERPL-MCNC: 6.8 G/DL (ref 6–8)
RBC # BLD AUTO: 3.54 10E6/UL (ref 4.4–5.9)
SODIUM SERPL-SCNC: 139 MMOL/L (ref 136–145)
WBC # BLD AUTO: 8.7 10E3/UL (ref 4–11)

## 2021-09-23 PROCEDURE — 250N000013 HC RX MED GY IP 250 OP 250 PS 637: Performed by: SURGERY

## 2021-09-23 PROCEDURE — 250N000013 HC RX MED GY IP 250 OP 250 PS 637: Performed by: INTERNAL MEDICINE

## 2021-09-23 PROCEDURE — 88313 SPECIAL STAINS GROUP 2: CPT | Mod: 26 | Performed by: PATHOLOGY

## 2021-09-23 PROCEDURE — 120N000001 HC R&B MED SURG/OB

## 2021-09-23 PROCEDURE — 258N000003 HC RX IP 258 OP 636: Performed by: SURGERY

## 2021-09-23 PROCEDURE — 82040 ASSAY OF SERUM ALBUMIN: CPT | Performed by: SURGERY

## 2021-09-23 PROCEDURE — 88307 TISSUE EXAM BY PATHOLOGIST: CPT | Mod: 26 | Performed by: PATHOLOGY

## 2021-09-23 PROCEDURE — 99024 POSTOP FOLLOW-UP VISIT: CPT | Performed by: PHYSICIAN ASSISTANT

## 2021-09-23 PROCEDURE — 36415 COLL VENOUS BLD VENIPUNCTURE: CPT | Performed by: SURGERY

## 2021-09-23 PROCEDURE — 250N000011 HC RX IP 250 OP 636: Performed by: SURGERY

## 2021-09-23 PROCEDURE — 88304 TISSUE EXAM BY PATHOLOGIST: CPT | Mod: 26 | Performed by: PATHOLOGY

## 2021-09-23 PROCEDURE — 85025 COMPLETE CBC W/AUTO DIFF WBC: CPT | Performed by: SURGERY

## 2021-09-23 PROCEDURE — 97110 THERAPEUTIC EXERCISES: CPT | Mod: GP

## 2021-09-23 PROCEDURE — 99233 SBSQ HOSP IP/OBS HIGH 50: CPT | Performed by: INTERNAL MEDICINE

## 2021-09-23 RX ORDER — ACETAMINOPHEN 650 MG/1
650 SUPPOSITORY RECTAL EVERY 4 HOURS PRN
Status: DISCONTINUED | OUTPATIENT
Start: 2021-09-23 | End: 2021-09-24 | Stop reason: HOSPADM

## 2021-09-23 RX ORDER — DABIGATRAN ETEXILATE 150 MG/1
150 CAPSULE ORAL 2 TIMES DAILY
Status: DISCONTINUED | OUTPATIENT
Start: 2021-09-23 | End: 2021-09-24 | Stop reason: HOSPADM

## 2021-09-23 RX ORDER — ACETAMINOPHEN 325 MG/1
650 TABLET ORAL EVERY 4 HOURS PRN
Status: DISCONTINUED | OUTPATIENT
Start: 2021-09-23 | End: 2021-09-24 | Stop reason: HOSPADM

## 2021-09-23 RX ADMIN — DOCUSATE SODIUM 50 MG AND SENNOSIDES 8.6 MG 1 TABLET: 8.6; 5 TABLET, FILM COATED ORAL at 20:45

## 2021-09-23 RX ADMIN — Medication 400 MCG: at 10:01

## 2021-09-23 RX ADMIN — PIPERACILLIN AND TAZOBACTAM 3.38 G: 3; .375 INJECTION, POWDER, FOR SOLUTION INTRAVENOUS at 09:57

## 2021-09-23 RX ADMIN — PIPERACILLIN AND TAZOBACTAM 3.38 G: 3; .375 INJECTION, POWDER, FOR SOLUTION INTRAVENOUS at 00:54

## 2021-09-23 RX ADMIN — ACETAMINOPHEN 650 MG: 325 TABLET ORAL at 20:53

## 2021-09-23 RX ADMIN — FUROSEMIDE 40 MG: 20 TABLET ORAL at 09:56

## 2021-09-23 RX ADMIN — LOSARTAN POTASSIUM 50 MG: 50 TABLET, FILM COATED ORAL at 09:55

## 2021-09-23 RX ADMIN — KETOROLAC TROMETHAMINE 15 MG: 15 INJECTION, SOLUTION INTRAMUSCULAR; INTRAVENOUS at 11:50

## 2021-09-23 RX ADMIN — PIPERACILLIN AND TAZOBACTAM 3.38 G: 3; .375 INJECTION, POWDER, FOR SOLUTION INTRAVENOUS at 16:41

## 2021-09-23 RX ADMIN — KETOROLAC TROMETHAMINE 15 MG: 15 INJECTION, SOLUTION INTRAMUSCULAR; INTRAVENOUS at 04:56

## 2021-09-23 RX ADMIN — FAMOTIDINE 20 MG: 20 TABLET, FILM COATED ORAL at 09:56

## 2021-09-23 RX ADMIN — POTASSIUM CHLORIDE, DEXTROSE MONOHYDRATE AND SODIUM CHLORIDE: 150; 5; 450 INJECTION, SOLUTION INTRAVENOUS at 00:54

## 2021-09-23 RX ADMIN — POLYETHYLENE GLYCOL 3350 17 G: 17 POWDER, FOR SOLUTION ORAL at 09:57

## 2021-09-23 RX ADMIN — DOCUSATE SODIUM 50 MG AND SENNOSIDES 8.6 MG 1 TABLET: 8.6; 5 TABLET, FILM COATED ORAL at 09:56

## 2021-09-23 RX ADMIN — FAMOTIDINE 20 MG: 20 TABLET, FILM COATED ORAL at 20:45

## 2021-09-23 RX ADMIN — DABIGATRAN ETEXILATE MESYLATE 150 MG: 150 CAPSULE ORAL at 20:45

## 2021-09-23 NOTE — PROGRESS NOTES
ASSESSMENT:  1. Calculus of gallbladder with chronic cholecystitis without obstruction    2. Sepsis, due to unspecified organism, unspecified whether acute organ dysfunction present (H)    3. Acute cholecystitis        Ashok Garcia is a 80 year old male who is s/p lap traci and liver biopsy on 9/22/21     PLAN:  -ADAT  -Pain control  -Drain can be removed on day of discharge  -Ok to start anticoagulation at anytime from our standpoint  -Strictly from surgical standpoint, he could discharge at anytime once he is medically stable    Sylvain Jackson PA-C  Pager - [phone]  Phone - [phone]   General Surgery    SUBJECTIVE:   He is doing well, denies pain, looking forward to drinking and eating, no n/v    Patient Vitals for the past 24 hrs:   BP Temp Temp src Pulse Resp SpO2   09/23/21 0736 (!) 174/74 97.1  F (36.2  C) Axillary 73 18 99 %   09/23/21 0340 (!) 143/70 97.8  F (36.6  C) Oral 53 18 99 %   09/22/21 2334 130/69 97.4  F (36.3  C) Oral 50 20 93 %   09/22/21 2204 122/57 -- -- -- -- 94 %   09/22/21 1946 (!) 163/87 97.4  F (36.3  C) Oral 78 24 --   09/22/21 1709 (!) 161/65 97.4  F (36.3  C) Oral 58 16 96 %   09/22/21 1608 138/67 97.3  F (36.3  C) Oral 57 16 99 %   09/22/21 1510 138/68 (!) 96.3  F (35.7  C) Axillary 55 16 99 %   09/22/21 1415 -- -- -- -- -- 98 %   09/22/21 1411 -- -- -- -- -- 91 %   09/22/21 1408 -- -- -- -- -- (!) 88 %   09/22/21 1405 (!) 155/78 96.8  F (36  C) Axillary 60 18 97 %   09/22/21 1310 -- -- -- -- -- 94 %   09/22/21 1308 -- -- -- -- -- (!) 88 %   09/22/21 1302 (!) 145/83 97.3  F (36.3  C) Oral 63 16 98 %   09/22/21 1230 (!) 160/75 97.2  F (36.2  C) Temporal 52 14 100 %   09/22/21 1215 (!) 162/74 -- -- 55 17 100 %   09/22/21 1207 (!) 159/73 -- -- 57 14 100 %   09/22/21 1200 (!) 172/79 -- -- 56 16 99 %   09/22/21 1145 (!) 169/75 -- -- 56 18 100 %   09/22/21 1130 (!) 153/67 -- -- 65 18 100 %   09/22/21 1122 (!) 145/93 97.7  F (36.5  C) Temporal 59 16 99 %        PHYSICAL  EXAM:  GEN: No acute distress, comfortable  CV:RRR  ABD:soft, obese, mild ttp RUQ  Drains: serosanguinous output   EXT:No cyanosis, edema or obvious abnormalities    09/22 0700 - 09/23 0659  In: 2201 [I.V.:2101]  Out: 390 [Urine:300; Drains:60]    Lab Results   Component Value Date    WBC 6.8 09/21/2021    HGB 11.9 09/21/2021    HCT 35.7 09/21/2021     09/21/2021     09/21/2021     INR/Prothrombin Time  Recent Labs   Lab 09/21/21  0614      CO2 24   BUN 15     Lab Results   Component Value Date    ALT 71 (H) 09/21/2021    AST 46 (H) 09/21/2021    ALKPHOS 323 (H) 09/21/2021

## 2021-09-23 NOTE — PLAN OF CARE
Problem: Adult Inpatient Plan of Care  Goal: Plan of Care Review  Outcome: Improving  Goal: Patient-Specific Goal (Individualized)  Outcome: Improving  Goal: Absence of Hospital-Acquired Illness or Injury  Outcome: Improving  Intervention: Identify and Manage Fall Risk  Recent Flowsheet Documentation  Taken 9/23/2021 0100 by Sharda Arzate RN  Safety Promotion/Fall Prevention: activity supervised  Intervention: Prevent Skin Injury  Recent Flowsheet Documentation  Taken 9/23/2021 0100 by Sharda Arzate RN  Body Position: position changed independently  Intervention: Prevent and Manage VTE (Venous Thromboembolism) Risk  Recent Flowsheet Documentation  Taken 9/23/2021 0100 by Sharda Arzate RN  VTE Prevention/Management: pneumatic compression device  Goal: Optimal Comfort and Wellbeing  Outcome: Improving  Goal: Readiness for Transition of Care  Outcome: Improving  Patient alert, oriented x 3. Lap sited clean, dry, intact. Used urinal at bedside. Tolerated Zosyn infusion okay. Reported abdominal soreness rating 1-2, administered scheduled Toradol. Slept well during the night.

## 2021-09-23 NOTE — PLAN OF CARE
Problem: Adult Inpatient Plan of Care  Goal: Optimal Comfort and Wellbeing  Outcome: Improving   Patient had scheduled pain meds, denies pain or discomfort, was drowsy at the start of the shift, now alert and oriented, MILLI drain intact and patent, remains on 02 @ 2l, sats has been between 93-98%, has been taking 02 off at times, has not been passing gas yet, abdominal dressing has old drainage, tolerating iv fluids well, voided in the urinal.

## 2021-09-23 NOTE — PROGRESS NOTES
Care Management Follow Up    Length of Stay (days): 7    Expected Discharge Date: 09/24/2021     Concerns to be Addressed:       Patient plan of care discussed at interdisciplinary rounds: Yes    Anticipated Discharge Disposition:  Home with family and [facility] [place].      Anticipated Discharge Services:    Anticipated Discharge DME:      Patient/family educated on Medicare website which has current facility and service quality ratings:    Education Provided on the Discharge Plan:    Patient/Family in Agreement with the Plan:      Referrals Placed by CM/SW:    Private pay costs discussed: Not applicable    Additional Information:  SW met with pt in pt room. Pt has arranged for [facility] [place] to come 3 times a week and for family to transport him and stay with him at discharge as needed. Pt pleasant and agreeable to assistance from others at discharge.      BENTLEY Rogel

## 2021-09-23 NOTE — PLAN OF CARE
Pt noted to be confused this morning when he woke up. He was oriented to self only. Pt also pulled out his IV at this time. Pt reoriented and was assisted into the chair. His IV was replaced. Pt later alert and oriented x4 once fully awake. Dr. Messina updated on this. No new orders.  Pt reported the highest his pain level got was 1/10. He received his scheduled toradol but no prn pain medication.  Pt ambulated in the hallways with assist x1 and tolerated well.  IVF discontinued as pt is taking in PO fluids well.   Diet upgraded to full liquids per surgery. Pt tolerating.   Pt continues on zosyn.       All medications administered by Maryann Hong, nursing student, were supervised and witnessed by this writer.

## 2021-09-23 NOTE — PROGRESS NOTES
"CLINICAL NUTRITION SERVICES - ASSESSMENT NOTE     Nutrition Prescription    RECOMMENDATIONS FOR MDs/PROVIDERS TO ORDER:  None at this time    Malnutrition Status:    Does not meet criteria    Recommendations already ordered by Registered Dietitian (RD):  Ensure Enlive    Future/Additional Recommendations:  Monitor diet advance     REASON FOR ASSESSMENT  Ashok Garcia is a/an 80 year old male assessed by the dietitian for LOS - pt likely to discharge today    NUTRITION HISTORY  Pt usually eats well, has been on cl/full liquids or NPO last 3 days  He has been trying to eat healthier, loves gravy.  Bought capsules for vegetable and fruit supplement. Pt cooks for himself and dines out.    CURRENT NUTRITION ORDERS  Diet: Full Liquid  Intake/Tolerance: hungry today, would like a supplement - chocolate    LABS  Labs reviewed    MEDICATIONS  Medications reviewed    ANTHROPOMETRICS  Height: 185.4 cm (6' 1\")  Most Recent Weight: 101.6 kg (224 lb)    IBW: 78.1 kg  BMI: Overweight BMI 25-29.9  Weight History:   Wt Readings from Last 3 Encounters:   09/20/21 101.6 kg (224 lb)   07/07/21 101.8 kg (224 lb 6.4 oz)   06/08/20 98.4 kg (217 lb)       MALNUTRITION  % Intake: Decreased intake does not meet criteria  % Weight Loss: None noted  Subcutaneous Fat Loss: mild orbital darkening  Muscle Loss: None observed  Fluid Accumulation/Edema: None noted  Malnutrition Diagnosis: Patient does not meet two of the established criteria necessary for diagnosing malnutrition    NUTRITION DIAGNOSIS  Altered GI function related to gall stones and recent surgery as evidenced by gradual advance to regular texture      INTERVENTIONS  Implementation  Nutrition Education: discussed lower fat balance nutrition and how to manage eating out   Medical food supplement therapy   Ensure Enlive daily    Goals  Diet advancement     Monitoring/Evaluation  Progress toward goals will be monitored and evaluated per protocol.    "

## 2021-09-23 NOTE — PROGRESS NOTES
Daily Progress Note        CODE STATUS:  Full Code    09/23/21  Assessment/Plan:  Ashok Garcia is a 80 year old male admitted on 9/16/2021. He presented after a fall and was found to be febrile and subsequently found to have biliary obstruction from sludge.     #Sepsis  #Acute on chronic cholecystitis  --- Patient presented after a fall found to be septic with fever, leukocytosis, elevated lactic acid  --- Ultrasound showed cholelithiasis, mildly positive Washington sign. CT showed pericholecystic soft tissue stranding and mild gallbladder wall thickening with numerous stones.  Bile ducts were not dilated.  Liver appeared cirrhotic.  --- Patient was started on IV Zosyn and sepsis has resolved  --- Patient was not felt to have a clinical exam terribly convincing for acute cholecystitis. He was felt to be higher operative risk due to new diagnosis of liver cirrhosis. Labs were monitored for a few days.  --- Liver enzymes remained elevated. GI took him for ERCP 9/20 and found stones and sludge in duct. Biliary sphincterotomy was performed, duct swept clear of stones/sludge.  --- General surgery was consulted and did initially recommend cholecystectomy however this plan was suspended pending ERCP result.  Since ERCP showed sludge in his bile duct, general surgery did recommend cholecystectomy which was completed 9/22. Intraop GB looked to have acute on chronic inflammation.  --- Continue IV Zosyn. Would continue abx a few more days as documented there was some bile spillage intraop.  --- Diet being advanced per surgeon  --- Surgeon ok with starting AC today.  --- Trend LFTs     #Elevated lft; new dx cirrhosis  --- Meld score 21  --- No bile duct dilation on US or CT, but found to have sludge in bile duct  --- Trend lft. Stably elevated despite sludge clearance. GI recommend continue to follow liver enzymes to normalization.  --- Holding statin  --- Viral hepatitis panel normal, ceruloplasmin low at 11, F-actin Ab  negative, A1AT normal phenotype  --- Needs to quit alcohol, has said he will  --- Liver biopsy obtained intraop, await result, can be followed up as outpatient     #Heart failure with preserved ejection fraction:  --- No evidence of exacerbation     --- Continue home Lasix and losartan  --- Last echo August 3 showed EF of 55 to 60%.     #Hypertension  --- Continue home Lasix and losartan  --- IV hydralazine as needed     #Persistent atrial fibrillation:   --- Pradaxa was on hold due to procedure(s). Surgery okayed for resumption of Pradaxa. Will restart from today.      #Coronary artery disease:  --- No current evidence of exacerbation.   --- Not on beta-blocker.  --- Troponin normal.     #Incidental finding of gynecomastia versus breast mass on imaging.  This can be followed up as outpatient.     #Mild macrocytic anemia, supports concerns about alcoholism     #Transient disorientation  #Deconditioning  --- He is overall doing well but PT and OT did recommend someone to stay with him at home for first few days, family aware and are going to do this  Home care PT/OT also recommended     #Postop pulmonary insufficiency  --- Likely due to sedation  --- Wean O2 as tolerated     DVT Prophylaxis: Moderate risk. Resuming pradaxa today  Code Status: Full Code        Disposition; Likely home with [facility] tomorrow  Barrier to discharge; IV antibiotics. Need for monitoring LFT and bleeding after resuming pradaxa.     LOS: 7 days     Subjective:  Interval History: Patient seen and examined. Notes, labs, imaging reports personally reviewed. Patient is new to me. Patient reports doing better. Reports only dull pain over the right side of the abdomen. No fevers or chills. No nausea or vomiting. Passing gas.    Review of Systems:   As mentioned in subjective.    Patient Active Problem List   Diagnosis     Hyperlipidemia LDL goal <70     Coronary Artery Disease     Obesity     Hypertension     Right bundle branch block     Esophageal  "Reflux     Persistent atrial fibrillation (H)     Cardiomyopathy (H)     Chronic diastolic congestive heart failure (H)     Acute cholecystitis     Sepsis, due to unspecified organism, unspecified whether acute organ dysfunction present (H)       Scheduled Meds:    ceFAZolin  2 g Intravenous Pre-Op/Pre-procedure x 1 dose     ceFAZolin  2 g Intravenous See Admin Instructions     dabigatran ANTICOAGULANT  150 mg Oral BID     famotidine  20 mg Oral Q12H    Or     famotidine  40 mg Intravenous Q12H     folic acid  400 mcg Oral Daily     furosemide  40 mg Oral Daily     losartan  50 mg Oral Daily     piperacillin-tazobactam  3.375 g Intravenous Q8H     polyethylene glycol  17 g Oral Daily     senna-docusate  1 tablet Oral BID     sodium chloride (PF)  3 mL Intracatheter Q8H     Continuous Infusions:    dextrose 5% and 0.45% NaCl + KCl 20 mEq/L 100 mL/hr at 09/23/21 0054     PRN Meds:.acetaminophen **OR** acetaminophen, bisacodyl, calcium carbonate, carboxymethylcellulose PF, diphenhydrAMINE **OR** diphenhydrAMINE, docusate sodium, doxylamine, hydrALAZINE, HYDROmorphone **OR** HYDROmorphone, lidocaine (buffered or not buffered), magnesium hydroxide, melatonin, morphine, naloxone **OR** naloxone **OR** naloxone **OR** naloxone, nitroGLYcerin, ondansetron **OR** ondansetron, oxyCODONE, prochlorperazine **OR** prochlorperazine, sodium chloride, sodium chloride (PF)    Objective:  Vital signs in last 24 hours:  Temp:  [96.3  F (35.7  C)-97.8  F (36.6  C)] 97.6  F (36.4  C)  Pulse:  [50-78] 68  Resp:  [16-24] 18  BP: (111-174)/(54-87) 111/54  SpO2:  [93 %-99 %] 95 %  Weight:   [unfilled]      Intake/Output Summary (Last 24 hours) at 9/23/2021 1502  Last data filed at 9/23/2021 1149  Gross per 24 hour   Intake 1091 ml   Output 670 ml   Net 421 ml       Physical Exam:  /54 (BP Location: Left arm)   Pulse 68   Temp 97.6  F (36.4  C) (Oral)   Resp 18   Ht 1.854 m (6' 1\")   Wt 101.6 kg (224 lb)   SpO2 95%   BMI 29.55 " kg/m       General: Not in obvious distress.  HEENT: NC, AT   Chest: Clear to auscultation bilaterally  Heart: S1S2 normal, regular. No M/R/G  Abdomen: Soft. Non-distended. Expected tenderness. Bowel sounds- active.  Extremities: No legs swelling  Neuro: alert and awake, grossly non-focal      Lab Results:(I have personally reviewed the results)    Recent Results (from the past 24 hour(s))   Glucose by meter    Collection Time: 09/22/21  9:10 PM   Result Value Ref Range    GLUCOSE BY METER POCT 156 (H) 70 - 99 mg/dL   Glucose by meter    Collection Time: 09/23/21  6:05 AM   Result Value Ref Range    GLUCOSE BY METER POCT 137 (H) 70 - 99 mg/dL   Comprehensive metabolic panel    Collection Time: 09/23/21  9:07 AM   Result Value Ref Range    Sodium 139 136 - 145 mmol/L    Potassium 4.1 3.5 - 5.0 mmol/L    Chloride 106 98 - 107 mmol/L    Carbon Dioxide (CO2) 27 22 - 31 mmol/L    Anion Gap 6 5 - 18 mmol/L    Urea Nitrogen 17 8 - 28 mg/dL    Creatinine 0.98 0.70 - 1.30 mg/dL    Calcium 9.2 8.5 - 10.5 mg/dL    Glucose 128 (H) 70 - 125 mg/dL    Alkaline Phosphatase 228 (H) 45 - 120 U/L    AST 53 (H) 0 - 40 U/L    ALT 77 (H) 0 - 45 U/L    Protein Total 6.8 6.0 - 8.0 g/dL    Albumin 2.8 (L) 3.5 - 5.0 g/dL    Bilirubin Total 3.3 (H) 0.0 - 1.0 mg/dL    GFR Estimate 72 >60 mL/min/1.73m2   CBC with platelets and differential    Collection Time: 09/23/21  9:07 AM   Result Value Ref Range    WBC Count 8.7 4.0 - 11.0 10e3/uL    RBC Count 3.54 (L) 4.40 - 5.90 10e6/uL    Hemoglobin 12.0 (L) 13.3 - 17.7 g/dL    Hematocrit 38.3 (L) 40.0 - 53.0 %     (H) 78 - 100 fL    MCH 33.9 (H) 26.5 - 33.0 pg    MCHC 31.3 (L) 31.5 - 36.5 g/dL    RDW 13.7 10.0 - 15.0 %    Platelet Count 241 150 - 450 10e3/uL    % Neutrophils 85 %    % Lymphocytes 7 %    % Monocytes 7 %    % Eosinophils 0 %    % Basophils 0 %    % Immature Granulocytes 1 %    NRBCs per 100 WBC 0 <1 /100    Absolute Neutrophils 7.5 1.6 - 8.3 10e3/uL    Absolute Lymphocytes 0.6  (L) 0.8 - 5.3 10e3/uL    Absolute Monocytes 0.6 0.0 - 1.3 10e3/uL    Absolute Eosinophils 0.0 0.0 - 0.7 10e3/uL    Absolute Basophils 0.0 0.0 - 0.2 10e3/uL    Absolute Immature Granulocytes 0.1 (H) <=0.0 10e3/uL    Absolute NRBCs 0.0 10e3/uL       All laboratory and imaging data in the past 24 hours reviewed  Serum Glucose range:   Recent Labs   Lab 09/23/21  0907 09/23/21  0605 09/22/21  2110 09/21/21  0614   * 137* 156* 135*     ABG: No lab results found in last 7 days.  CBC:   Recent Labs   Lab 09/23/21  0907 09/21/21  0614 09/20/21  0612 09/17/21  0641 09/16/21  1517   WBC 8.7 6.8 7.8   < > 15.5*   HGB 12.0* 11.9* 11.2*   < > 12.5*   HCT 38.3* 35.7* 33.5*   < > 36.2*   * 102* 100   < > 99    199 184   < > 173   NEUTROPHIL 85  --   --   --  91   LYMPH 7  --   --   --  3   MONOCYTE 7  --   --   --  5   EOSINOPHIL 0  --   --   --  0    < > = values in this interval not displayed.     Chemistry:   Recent Labs   Lab 09/23/21  0907 09/21/21  0614 09/20/21  0612    137 137   POTASSIUM 4.1 3.7 3.5   CHLORIDE 106 103 105   CO2 27 24 23   BUN 17 15 17   CR 0.98 0.91 0.93   GFRESTIMATED 72 78 76   SILVIO 9.2 9.2 8.9   PROTTOTAL 6.8 6.9 6.2   ALBUMIN 2.8* 2.7* 2.4*   AST 53* 46* 65*   ALT 77* 71* 77*   ALKPHOS 228* 323* 326*   BILITOTAL 3.3* 4.1* 5.8*     Coags:  Recent Labs   Lab 09/18/21  0706 09/17/21  0850   INR 1.35* 1.53*     Cardiac Markers:  Recent Labs   Lab 09/16/21  1517   TROPONINI 0.02          Abdomen US, limited (RUQ only)    Result Date: 9/16/2021  EXAM: US ABDOMEN LIMITED LOCATION: [facility] DATE/TIME: 9/16/2021 7:22 PM INDICATION: abnormal CT and LFTs/bili, abnormal gallbladder on CT COMPARISON: CT abdomen the same date TECHNIQUE: Limited abdominal ultrasound. FINDINGS: GALLBLADDER: Limited evaluation due to technical factors including intercostal scanning. Exam is also limited due to the patient's body habitus. Cholelithiasis better seen on comparison CT.  Mild layering sludge. Mild wall thickening. Mildly positive sonographic Washington's sign. BILE DUCTS: No biliary dilatation. The common duct measures 4 mm. LIVER: Nodular surface contour and diffusely coarsened heterogeneous echotexture. No focal mass. RIGHT KIDNEY: No hydronephrosis. PANCREAS: The visualized portions are normal. No ascites.     IMPRESSION: 1.  Cholelithiasis better seen on comparison CT and mildly positive sonographic Washington's sign. In conjunction with pericholecystic edema seen on comparison CT, these findings are suspicious for acute cholecystitis. No biliary ductal dilatation. 2.  Cirrhosis.     XR Chest Port 1 View    Result Date: 9/16/2021  EXAM: XR CHEST PORT 1 VIEW LOCATION: [facility] DATE/TIME: 9/16/2021 4:18 PM INDICATION: Hypoxia and fever. COMPARISON: None.     IMPRESSION: Previous sternotomy for coronary artery bypass. Heart size and vascularity are normal. Shallow inspiration. Bibasilar linear opacities could represent subsegmental atelectasis versus scar. No pneumothorax nor gross pleural effusion.    CT Abdomen Pelvis w Contrast    Result Date: 9/16/2021  EXAM: CT ABDOMEN PELVIS W CONTRAST LOCATION: [facility] DATE/TIME: 9/16/2021 4:55 PM INDICATION: Abdominal pain and fever. COMPARISON: None. TECHNIQUE: CT scan of the abdomen and pelvis was performed following injection of IV contrast. Multiplanar reformats were obtained. Dose reduction techniques were used. CONTRAST: 100 mL Isovue-370 FINDINGS: LOWER CHEST: Mild pleural thickening and calcific plaque present involving right lung base with mild linear fibrosis. Very slight fibrosis at left base. Previous CABG. HEPATOBILIARY: There is mild gallbladder wall thickening and pericholecystic soft tissue stranding, numerous stones are present. Findings concerning for acute cholecystitis. No bile duct dilatation. Cirrhotic morphology liver, 4 mm presumed cyst dome of right lobe.. PANCREAS:  Normal. SPLEEN: Normal. ADRENAL GLANDS: Normal. KIDNEYS/BLADDER: Subcentimeter cyst right lower pole, kidneys otherwise negative. BOWEL: No obstruction or inflammatory change. LYMPH NODES: Normal. VASCULATURE: Modest atherosclerotic plaque. PELVIC ORGANS: Normal, no ascites. MUSCULOSKELETAL: No suspicious bony lesion. Significant asymmetry soft tissue density left chest wall suggests unilateral left gynecomastia.     IMPRESSION: 1.  Cholelithiasis with inflamed appearance of gallbladder suggesting acute cholecystitis. Consider follow-up targeted ultrasound as indicated. 2.  Cirrhotic appearing liver. 3.  Presumed unilateral left gynecomastia, however, left breast malignancy not excluded by this exam..    CT Chest Pulmonary Embolism w Contrast    Result Date: 9/16/2021  EXAM: CT CHEST PULMONARY EMBOLISM W CONTRAST LOCATION: [facility] DATE/TIME: 9/16/2021 4:55 PM INDICATION: Abdominal pain, fever, hypertension, coronary disease, atrial fibrillation, weakness, fatigue COMPARISON: None. TECHNIQUE: CT chest pulmonary angiogram during arterial phase injection of IV contrast. Multiplanar reformats and MIP reconstructions were performed. Dose reduction techniques were used. CONTRAST: isovue 370 100ml FINDINGS: ANGIOGRAM CHEST: Mildly limited due to respiratory motion. No central or definite peripheral pulmonary artery embolism. Thoracic aorta is negative for dissection. No CT evidence of right heart strain. LUNGS AND PLEURA: Right-sided calcified pleural plaque. Bibasilar atelectasis versus scarring. Mosaic lung attenuation which may reflect air trapping. No focal pneumonic consolidation or pleural effusion. 4 mm left lower lobe nodule image 144 series 7. MEDIASTINUM/AXILLAE: Asymmetric left-sided gynecomastia. Upper normal heart size. No pericardial effusion. Post open-heart surgery. Tiny hiatal hernia. CORONARY ARTERY CALCIFICATION: Previous intervention (stents or CABG). UPPER ABDOMEN: Please  see the concurrent CT abdomen report. Cholelithiasis. Lobulated liver surface contour. MUSCULOSKELETAL: Osseous demineralization. Mild to moderate age indeterminate T8 vertebral body compression fracture.     IMPRESSION: 1.  No pulmonary artery embolism or thoracic aortic dissection. 2.  Findings suggesting pulmonary air trapping which can be seen with nonspecific bronchiolitis. No focal pneumonic consolidation or pleural effusion. 3.  Left lower lobe 4 mm pulmonary nodule. This can be followed per guidelines below. 4.  Mild to moderate T12 vertebral body compression fracture, age indeterminate but likely remote assuming no focal thoracic spinal pain.    Head CT w/o contrast    Result Date: 9/16/2021  EXAM: CT HEAD W/O CONTRAST LOCATION: [facility] DATE/TIME: 9/16/2021 4:55 PM INDICATION: Head injury on anticoagulation COMPARISON: None. TECHNIQUE: Routine CT Head without IV contrast. Multiplanar reformats. Dose reduction techniques were used. FINDINGS: INTRACRANIAL CONTENTS: No intracranial hemorrhage, extraaxial collection, or mass effect.  No CT evidence of acute infarct. Moderate presumed chronic small vessel ischemic changes. Moderate generalized volume loss. No hydrocephalus. VISUALIZED ORBITS/SINUSES/MASTOIDS: No intraorbital abnormality. No paranasal sinus mucosal disease. No middle ear or mastoid effusion. BONES/SOFT TISSUES: No acute abnormality.     IMPRESSION: 1.  No acute intracranial process.    XR Surgery YENNY Fluoro L/T 5 Min    Result Date: 9/20/2021  This exam was marked as non-reportable because it will not be read by a radiologist or a [facility] non-radiologist provider.       Latest radiology report personally reviewed.    Note created using dragon voice recognition software so sounds alike errors may have escaped editing.      09/23/2021   Zay Messina MD  Hospitalist, [facility]  Pager: [phone]

## 2021-09-24 ENCOUNTER — APPOINTMENT (OUTPATIENT)
Dept: PHYSICAL THERAPY | Facility: HOSPITAL | Age: 81
DRG: 854 | End: 2021-09-24
Payer: MEDICARE

## 2021-09-24 VITALS
WEIGHT: 224 LBS | TEMPERATURE: 97.5 F | DIASTOLIC BLOOD PRESSURE: 57 MMHG | HEART RATE: 84 BPM | RESPIRATION RATE: 18 BRPM | BODY MASS INDEX: 29.69 KG/M2 | OXYGEN SATURATION: 96 % | SYSTOLIC BLOOD PRESSURE: 120 MMHG | HEIGHT: 73 IN

## 2021-09-24 DIAGNOSIS — I10 ESSENTIAL HYPERTENSION: ICD-10-CM

## 2021-09-24 DIAGNOSIS — E78.5 HYPERLIPIDEMIA LDL GOAL <70: Primary | ICD-10-CM

## 2021-09-24 DIAGNOSIS — I42.9 CARDIOMYOPATHY (H): ICD-10-CM

## 2021-09-24 DIAGNOSIS — I48.19 PERSISTENT ATRIAL FIBRILLATION (H): ICD-10-CM

## 2021-09-24 DIAGNOSIS — I25.10 CORONARY ATHEROSCLEROSIS: ICD-10-CM

## 2021-09-24 LAB
ALBUMIN SERPL-MCNC: 2.8 G/DL (ref 3.5–5)
ALP SERPL-CCNC: 207 U/L (ref 45–120)
ALT SERPL W P-5'-P-CCNC: 80 U/L (ref 0–45)
ANION GAP SERPL CALCULATED.3IONS-SCNC: 4 MMOL/L (ref 5–18)
AST SERPL W P-5'-P-CCNC: 63 U/L (ref 0–40)
BILIRUB SERPL-MCNC: 3.6 MG/DL (ref 0–1)
BUN SERPL-MCNC: 16 MG/DL (ref 8–28)
CALCIUM SERPL-MCNC: 8.9 MG/DL (ref 8.5–10.5)
CHLORIDE BLD-SCNC: 103 MMOL/L (ref 98–107)
CO2 SERPL-SCNC: 29 MMOL/L (ref 22–31)
CREAT SERPL-MCNC: 0.93 MG/DL (ref 0.7–1.3)
GFR SERPL CREATININE-BSD FRML MDRD: 76 ML/MIN/1.73M2
GLUCOSE BLD-MCNC: 109 MG/DL (ref 70–125)
GLUCOSE BLDC GLUCOMTR-MCNC: 82 MG/DL (ref 70–99)
GLUCOSE BLDC GLUCOMTR-MCNC: 90 MG/DL (ref 70–99)
POTASSIUM BLD-SCNC: 3.8 MMOL/L (ref 3.5–5)
PROT SERPL-MCNC: 6.3 G/DL (ref 6–8)
SODIUM SERPL-SCNC: 136 MMOL/L (ref 136–145)

## 2021-09-24 PROCEDURE — 250N000013 HC RX MED GY IP 250 OP 250 PS 637: Performed by: SURGERY

## 2021-09-24 PROCEDURE — 97110 THERAPEUTIC EXERCISES: CPT | Mod: GP

## 2021-09-24 PROCEDURE — 99024 POSTOP FOLLOW-UP VISIT: CPT | Performed by: PHYSICIAN ASSISTANT

## 2021-09-24 PROCEDURE — 36415 COLL VENOUS BLD VENIPUNCTURE: CPT | Performed by: INTERNAL MEDICINE

## 2021-09-24 PROCEDURE — 97116 GAIT TRAINING THERAPY: CPT | Mod: GP

## 2021-09-24 PROCEDURE — 99239 HOSP IP/OBS DSCHRG MGMT >30: CPT | Performed by: INTERNAL MEDICINE

## 2021-09-24 PROCEDURE — 250N000013 HC RX MED GY IP 250 OP 250 PS 637: Performed by: INTERNAL MEDICINE

## 2021-09-24 PROCEDURE — 250N000011 HC RX IP 250 OP 636: Performed by: SURGERY

## 2021-09-24 PROCEDURE — 82040 ASSAY OF SERUM ALBUMIN: CPT | Performed by: INTERNAL MEDICINE

## 2021-09-24 RX ADMIN — POLYETHYLENE GLYCOL 3350 17 G: 17 POWDER, FOR SOLUTION ORAL at 09:54

## 2021-09-24 RX ADMIN — PIPERACILLIN AND TAZOBACTAM 3.38 G: 3; .375 INJECTION, POWDER, FOR SOLUTION INTRAVENOUS at 09:55

## 2021-09-24 RX ADMIN — PIPERACILLIN AND TAZOBACTAM 3.38 G: 3; .375 INJECTION, POWDER, FOR SOLUTION INTRAVENOUS at 00:06

## 2021-09-24 RX ADMIN — FAMOTIDINE 20 MG: 20 TABLET, FILM COATED ORAL at 09:54

## 2021-09-24 RX ADMIN — DABIGATRAN ETEXILATE MESYLATE 150 MG: 150 CAPSULE ORAL at 09:53

## 2021-09-24 RX ADMIN — DOCUSATE SODIUM 50 MG AND SENNOSIDES 8.6 MG 1 TABLET: 8.6; 5 TABLET, FILM COATED ORAL at 09:53

## 2021-09-24 RX ADMIN — ACETAMINOPHEN 650 MG: 325 TABLET ORAL at 06:06

## 2021-09-24 RX ADMIN — Medication 400 MCG: at 09:53

## 2021-09-24 RX ADMIN — FUROSEMIDE 40 MG: 20 TABLET ORAL at 09:54

## 2021-09-24 RX ADMIN — LOSARTAN POTASSIUM 50 MG: 50 TABLET, FILM COATED ORAL at 09:53

## 2021-09-24 RX ADMIN — ACETAMINOPHEN 650 MG: 325 TABLET ORAL at 09:52

## 2021-09-24 NOTE — DISCHARGE INSTRUCTIONS
Follow up: It is our practice to have all patients follow up with us 2-3 weeks after their surgery to ensure they are recovering well.  For straightforward laparoscopic procedures, this can be done either in clinic as a scheduled follow up appointment, or over the phone.  If you would like a scheduled follow up appointment in clinic, please call us at [phone] to schedule an appointment at your convenience.  If you would prefer to follow up with us by phone please let us know so that we may contact you 2-3 weeks following your procedure.        Diet: Regular diet. Patients can have difficulty with constipation following surgery, due in part to the administration of narcotic medications.  If you are suffering with constipation, you should avoid foods such as hard cheeses or red meat.  Foods high in fiber are recommended.      Activity: You should continue to be active at home, including ambulating frequently.  If possible try to limit the amount of time spent in bed.    Restrictions: You have no lifting restrictions post operatively, but may wish to avoid strenuous physical activity for 1-2 weeks.  You should limit your physical activity if it causes you discomfort; however, this should resolve within 1-2 weeks.   Walking does not count as strenuous physical activity.  You are safe to walk up and down stairs.  Following 2 weeks you may resume all normal physical activity.    Wound / drain care: 1.  Remove dressings in 2 days  2.  It is okay to shower starting tomorrow  3.  Do not soak in a tub for 1 week    It is normal to have a small rim of red present around the incisions. This should not, however, extend beyond 1/4 inch from the incision.  If your incisions become increasingly tender, red, or draining, please contact us.             Home care services have been arranged for you.  Agency: [facility]  Services: Physical Therapy  Phone: [phone]  Instructions: Home Care will contact you within 24  hours to arrange the first visit.

## 2021-09-24 NOTE — PROGRESS NOTES
ASSESSMENT:  1. Calculus of gallbladder with chronic cholecystitis without obstruction    2. Sepsis, due to unspecified organism, unspecified whether acute organ dysfunction present (H)    3. Acute cholecystitis        Ashok Garcia is a 80 year old male who is s/p lap traci and liver biopsy on 9/22/21     PLAN:  -ADAT  -Pain control  -Drain can be removed on day of discharge  -Ok to start anticoagulation at anytime from our standpoint  -Strictly from surgical standpoint, he could discharge at anytime once he is medically stable    Sylvain Jackson PA-C  Pager - [phone]  Phone - [phone]   General Surgery    SUBJECTIVE:   He is doing well, denies pain, wants to eat more, passing gas    Patient Vitals for the past 24 hrs:   BP Temp Temp src Pulse Resp SpO2   09/24/21 0719 (!) 148/72 97.8  F (36.6  C) Oral 73 19 96 %   09/23/21 2353 (!) 150/67 97.5  F (36.4  C) Oral 65 18 95 %   09/23/21 1547 118/58 97.4  F (36.3  C) Oral 60 18 97 %   09/23/21 1120 111/54 97.6  F (36.4  C) Oral 68 18 95 %        PHYSICAL EXAM:  GEN: No acute distress, comfortable  CV:RRR  ABD:soft, obese, mild ttp RUQ  Drains: serosanguinous output   EXT:No cyanosis, edema or obvious abnormalities    09/23 0700 - 09/24 0659  In: 951 [P.O.:840; I.V.:111]  Out: 1640 [Urine:1450; Drains:190]    Lab Results   Component Value Date    WBC 6.8 09/21/2021    HGB 11.9 09/21/2021    HCT 35.7 09/21/2021     09/21/2021     09/21/2021     INR/Prothrombin Time  Recent Labs   Lab 09/23/21  0907      CO2 27   BUN 17     Lab Results   Component Value Date    ALT 77 (H) 09/23/2021    AST 53 (H) 09/23/2021    ALKPHOS 228 (H) 09/23/2021

## 2021-09-24 NOTE — PLAN OF CARE
Pt alert and oriented X4, no confusion noted. He was up on the chair most of the evening. Ambulated in room, declined to ambulate in hallway stating that he already did three times .    Tolerating oral fluids and the full liquid diet. No BM, pt passing lots of gas. The MILLI drain patent and draining, emptied 60 ML.    Pt C/O of increased pain at bed time, managed with prn Tylenol.    Miguel Cummins RN

## 2021-09-24 NOTE — PLAN OF CARE
Problem: Adult Inpatient Plan of Care  Goal: Plan of Care Review  Outcome: Improving     Problem: Risk for Delirium  Goal: Optimal Coping  Outcome: Improving    Patient is alert and oriented. BP slightly elevated but not requiring prn hydralazine. Denies dizziness or lightheadedness.   Reports sore, dull pain to the right abdomen. Denies needing any pain intervention at this time. MILLI patent and draining.  No BM yet since surgery but reports passing out gas.   SCDs in place and working.  Receiving IV antibiotics.  Tolerating full liquid diet.  0606Abdominal george 3/10. Tylenol given with relief.

## 2021-09-24 NOTE — PLAN OF CARE
Care Management Discharge Note    Discharge Date: 09/24/2021       Discharge Disposition: Home Care    Discharge Services: None    Discharge DME: None    Discharge Transportation: family or friend will provide    Private pay costs discussed: Not applicable    PAS Confirmation Code:    Patient/family educated on Medicare website which has current facility and service quality ratings: yes    Education Provided on the Discharge Plan:    Persons Notified of Discharge Plans: Patient  Patient/Family in Agreement with the Plan: yes    Handoff Referral Completed: No    Additional Information:  Patient to discharge home with [facility]x a week and [facility] for home PT.      SHIRA Gabriel

## 2021-09-24 NOTE — DISCHARGE SUMMARY
[facility] MEDICINE  DISCHARGE SUMMARY     Primary Care Physician: Stef Sims  Admission Date: 9/16/2021   Discharge Provider: IVÁN Rolle Discharge Date: 9/24/2021   Diet: Regular   Code Status: Full Code   Activity: DCACTIVITY: Activity as tolerated        Condition at Discharge: Stable     REASON FOR PRESENTATION(See Admission Note for Details)   Acute cholecystitis, sepsis    PRINCIPAL & ACTIVE DISCHARGE DIAGNOSES     Active Problems:    Acute cholecystitis    Sepsis, due to unspecified organism, unspecified whether acute organ dysfunction present (H)      PENDING LABS     Unresulted Labs Ordered in the Past 30 Days of this Admission     No orders found from 8/17/2021 to 9/17/2021.            PROCEDURES ( this hospitalization only)      Procedure(s):  CHOLECYSTECTOMY, LAPAROSCOPIC WITH LIVER BIOPSY    RECOMMENDATIONS TO OUTPATIENT PROVIDER FOR F/U VISIT     Follow-up Appointments     Follow-up and recommended labs and tests       Follow up with PCP in 7 days  Follow up with [facility] in next 2-4 wks                 DISPOSITION     Home with home care    SUMMARY OF HOSPITAL COURSE:    Ashok Garcia is a 80 year old male admitted on 9/16/2021. He presented after a fall and was found to be febrile and subsequently found to have biliary obstruction from sludge.     #Sepsis  #Acute on chronic cholecystitis  --- Patient presented after a fall found to be septic with fever, leukocytosis, elevated lactic acid  --- Ultrasound showed cholelithiasis, mildly positive Washington sign. CT showed pericholecystic soft tissue stranding and mild gallbladder wall thickening with numerous stones.  Bile ducts were not dilated.  Liver appeared cirrhotic.  --- Patient was started on IV Zosyn and sepsis has resolved  --- Patient was not felt to have a clinical exam terribly convincing for acute cholecystitis. He was felt to be higher operative risk due to new diagnosis of liver  cirrhosis. Labs were monitored for a few days.  --- Liver enzymes remained elevated. GI took him for ERCP 9/20 and found stones and sludge in duct. Biliary sphincterotomy was performed, duct swept clear of stones/sludge.  --- General surgery was consulted and did initially recommend cholecystectomy however this plan was suspended pending ERCP result.  Since ERCP showed sludge in his bile duct, general surgery did recommend cholecystectomy which was completed 9/22. Intraop GB looked to have acute on chronic inflammation.  --- Continue IV Zosyn. Would continue abx a few more days as documented there was some bile spillage intraop.  --- Trend LFTs     #Elevated lft; new dx cirrhosis  --- Meld score 21  --- No bile duct dilation on US or CT, but found to have sludge in bile duct  --- Trend lft. Stably elevated despite sludge clearance. GI recommend continue to follow liver enzymes to normalization.  --- Holding statin  --- Viral hepatitis panel normal, ceruloplasmin low at 11, F-actin Ab negative, A1AT normal phenotype  --- Needs to quit alcohol, has said he will  --- Liver biopsy obtained intraop, await result, can be followed up as outpatient     #Heart failure with preserved ejection fraction:  --- No evidence of exacerbation     --- Continue home Lasix and losartan  --- Last echo August 3 showed EF of 55 to 60%.     #Hypertension  --- Continue home Lasix and losartan  --- IV hydralazine as needed     #Persistent atrial fibrillation:   --- Pradaxa was on hold due to procedure(s). Surgery okayed for resumption of Pradaxa. Pradaxa resumed 9/23      #Coronary artery disease:  --- No current evidence of exacerbation.   --- Not on beta-blocker.  --- Troponin normal.     #Incidental finding of gynecomastia versus breast mass on imaging.  This can be followed up as outpatient.     #Mild macrocytic anemia, supports concerns about alcoholism     #Transient disorientation  #Deconditioning  --- He is overall doing well but PT  and OT did recommend someone to stay with him at home for first few days, family aware and are going to do this  Home care PT/OT also recommended     #Postop pulmonary insufficiency  --- Likely due to sedation  --- Wean O2 as tolerated      Discharge Medications with Med changes:     Current Discharge Medication List      START taking these medications    Details   amoxicillin-clavulanate (AUGMENTIN) 875-125 MG tablet Take 1 tablet by mouth 2 times daily for 5 days  Qty: 10 tablet, Refills: 0    Associated Diagnoses: Acute cholecystitis         CONTINUE these medications which have NOT CHANGED    Details   cyanocobalamin (VITAMIN B-12) 1000 MCG tablet [CYANOCOBALAMIN (VITAMIN B-12) 1000 MCG TABLET] Take 1,000 mcg by mouth daily.      dabigatran ANTICOAGULANT (PRADAXA ANTICOAGULANT) 150 MG capsule Take 1 capsule (150 mg) by mouth 2 times daily Store in original 's bottle or blister pack; use within 120 days of opening.  Qty: 180 capsule, Refills: 2    Associated Diagnoses: Atrial fibrillation (H)      doxylamine (UNISOM) 25 mg tablet [DOXYLAMINE (UNISOM) 25 MG TABLET] Take 25 mg by mouth at bedtime as needed for sleep.      folic acid (FOLVITE) 400 MCG tablet [FOLIC ACID (FOLVITE) 400 MCG TABLET] Take 400 mcg by mouth daily.      furosemide (LASIX) 40 MG tablet Take 1 tablet (40 mg) by mouth 2 times daily  Qty: 180 tablet, Refills: 1    Associated Diagnoses: CHF (congestive heart failure), NYHA class II, chronic, diastolic (H)      losartan (COZAAR) 50 MG tablet Take 1 tablet (50 mg) by mouth daily  Qty: 90 tablet, Refills: 1    Associated Diagnoses: Coronary atherosclerosis      magnesium oxide (MAG-OX) 400 mg (241.3 mg magnesium) tablet [MAGNESIUM OXIDE (MAG-OX) 400 MG (241.3 MG MAGNESIUM) TABLET] Take 1 tablet (400 mg total) by mouth daily.  Qty: 90 tablet, Refills: 1    Associated Diagnoses: CHF (congestive heart failure), NYHA class II, chronic, diastolic (H)      multivitamin therapeutic tablet  [MULTIVITAMIN THERAPEUTIC TABLET] Take 1 tablet by mouth daily.      nitroglycerin (NITROSTAT) 0.4 MG SL tablet [NITROGLYCERIN (NITROSTAT) 0.4 MG SL TABLET] PLACE 1 TABLET UNDER THE TONGUE EVERY 5 MINUTES AS NEEDED FOR CHEST PAIN.  Qty: 3 Bottle, Refills: 3    Associated Diagnoses: CAD (coronary artery disease)      omeprazole (PRILOSEC) 20 MG capsule [OMEPRAZOLE (PRILOSEC) 20 MG CAPSULE] Take 20 mg by mouth daily.      simvastatin (ZOCOR) 40 MG tablet [SIMVASTATIN (ZOCOR) 40 MG TABLET] TAKE 1 TABLET BY MOUTH DAILY  Qty: 90 tablet, Refills: 2    Associated Diagnoses: Hypercholesterolemia      VIRTUSSIN AC  mg/5 mL liquid Take 1 teaspoonful by mouth every 6 hours as needed for cough   Refills: 2                   Rationale for medication changes:      Please see the discharge summary        Consults   GI and general surgery      Immunizations given this encounter     Most Recent Immunizations   Administered Date(s) Administered     Flu, Unspecified 10/11/2017     Influenza (IIV3) PF 12/03/2013     Pneumo Conj 13-V (2010&after) 09/22/2015     Pneumococcal 23 valent 10/25/2016     TD (ADULT, 7+) 11/05/2011     Zoster vaccine, live 06/18/2012           Anticoagulation Information      Recent INR results:   Recent Labs   Lab 09/18/21  0706   INR 1.35*     Warfarin doses (if applicable) or name of other anticoagulant: NA      SIGNIFICANT IMAGING FINDINGS     Results for orders placed or performed during the hospital encounter of 09/16/21   XR Chest Port 1 View    Impression    IMPRESSION: Previous sternotomy for coronary artery bypass. Heart size and vascularity are normal. Shallow inspiration. Bibasilar linear opacities could represent subsegmental atelectasis versus scar. No pneumothorax nor gross pleural effusion.   Head CT w/o contrast    Impression    IMPRESSION:  1.  No acute intracranial process.   CT Abdomen Pelvis w Contrast    Impression    IMPRESSION:   1.  Cholelithiasis with inflamed appearance of  gallbladder suggesting acute cholecystitis. Consider follow-up targeted ultrasound as indicated.  2.  Cirrhotic appearing liver.  3.  Presumed unilateral left gynecomastia, however, left breast malignancy not excluded by this exam..   CT Chest Pulmonary Embolism w Contrast    Impression    IMPRESSION:  1.  No pulmonary artery embolism or thoracic aortic dissection.  2.  Findings suggesting pulmonary air trapping which can be seen with nonspecific bronchiolitis. No focal pneumonic consolidation or pleural effusion.  3.  Left lower lobe 4 mm pulmonary nodule. This can be followed per guidelines below.  4.  Mild to moderate T12 vertebral body compression fracture, age indeterminate but likely remote assuming no focal thoracic spinal pain.   Abdomen US, limited (RUQ only)    Impression    IMPRESSION:  1.  Cholelithiasis better seen on comparison CT and mildly positive sonographic Washington's sign. In conjunction with pericholecystic edema seen on comparison CT, these findings are suspicious for acute cholecystitis. No biliary ductal dilatation.  2.  Cirrhosis.           SIGNIFICANT LABORATORY FINDINGS     Most Recent 3 CBC's:Recent Labs   Lab Test 09/23/21  0907 09/21/21  0614 09/20/21  0612   WBC 8.7 6.8 7.8   HGB 12.0* 11.9* 11.2*   * 102* 100    199 184     Most Recent 2 LFT's:Recent Labs   Lab Test 09/24/21  1052 09/23/21  0907   AST 63* 53*   ALT 80* 77*   ALKPHOS 207* 228*   BILITOTAL 3.6* 3.3*     Most Recent 3 INR's:Recent Labs   Lab Test 09/18/21  0706 09/17/21  0850   INR 1.35* 1.53*         Discharge Orders        Home Care PT Referral for Hospital Discharge      Home Care OT Referral for Hospital Discharge      Reason for your hospital stay    Acute cholecystitis     Follow-up and recommended labs and tests     Follow up with PCP in 7 days  Follow up with [facility] in next 2-4 wks     Activity    Your activity upon discharge: activity as tolerated     MD face to face encounter    Documentation of Face  to Face and Certification for Home Health Services    I certify that patient: Ashok Garcia is under my care and that I, or a nurse practitioner or physician's assistant working with me, had a face-to-face encounter that meets the physician face-to-face encounter requirements with this patient on: 9/24/2021.    This encounter with the patient was in whole, or in part, for the following medical condition, which is the primary reason for home health care: acute cholecystitis, new diagnosis of liver cirrhosis.    I certify that, based on my findings, the following services are medically necessary home health services: Occupational Therapy and Physical Therapy.    My clinical findings support the need for the above services because: Occupational Therapy Services are needed to assess and treat cognitive ability and address ADL safety due to impairment in cognition. and Physical Therapy Services are needed to assess and treat the following functional impairments: gait.    Further, I certify that my clinical findings support that this patient is homebound (i.e. absences from home require considerable and taxing effort and are for medical reasons or [employer] services or infrequently or of short duration when for other reasons) because: Patient is unable to leave the house.    Based on the above findings. I certify that this patient is confined to the home and needs intermittent skilled nursing care, physical therapy and/or speech therapy.  The patient is under my care, and I have initiated the establishment of the plan of care.  This patient will be followed by a physician who will periodically review the plan of care.  Physician/Provider to provide follow up care: Stef Sims    Attending [facility] physician (the Medicare certified [place] provider): IVÁN Rolle  Physician Signature: See electronic signature associated with these discharge orders.  Date: 9/24/2021     Diet    Follow this diet  "upon discharge: Regular       Examination   /57 (BP Location: Left arm)   Pulse 84   Temp 97.5  F (36.4  C) (Oral)   Resp 18   Ht 1.854 m (6' 1\")   Wt 101.6 kg (224 lb)   SpO2 96%   BMI 29.55 kg/m         General: Not in obvious distress.  HEENT: NC, AT   Chest: Clear to auscultation bilaterally  Heart: S1S2 normal, regular. No M/R/G  Abdomen: Soft. Non-distended. Expected tenderness. Bowel sounds- active.  Extremities: No legs swelling. Drain is in place (which the surgeon is planning to remove before the discharge)  Neuro: alert and awake, grossly non-focal    Please see EMR for more detailed significant labs, imaging, consultant notes etc.    I, IVÁN Rolle, personally saw the patient today and spent greater than 30 minutes discharging this patient.    IVÁN Rolle  [facility]    CC:Stef Sims    "

## 2021-09-24 NOTE — PROGRESS NOTES
Care Management Follow Up    Length of Stay (days): 8    Expected Discharge Date: 09/24/2021     Concerns to be Addressed:       Patient plan of care discussed at interdisciplinary rounds: Yes    Anticipated Discharge Disposition:       Anticipated Discharge Services:    Anticipated Discharge DME:      Patient/family educated on Medicare website which has current facility and service quality ratings:  Yes  Education Provided on the Discharge Plan:    Patient/Family in Agreement with the Plan:      Referrals Placed by CM/SW:    Private pay costs discussed: Not applicable    Additional Information:  Met with pt to discuss recommendation for home care PT.  Pt is in agreement and discussed home care agencies and prefers referral to [facility].  Referral made and [facility] able to accept pt.        SHIRA Gabriel

## 2021-09-24 NOTE — PLAN OF CARE
Problem: Adult Inpatient Plan of Care  Goal: Plan of Care Review  Outcome: Adequate for Discharge  Pt ready for discharge.  Reviewed discharge orders with pt who verbalized understanding of discharge orders.  Pt to follow up with primary care provider and surgery.  Home PT arranged.  Pt left via wheelchair with NA.  Son to drive pt home.    Problem: Adult Inpatient Plan of Care  Goal: Optimal Comfort and Wellbeing  Outcome: Adequate for Discharge  Pt took tylenol for pain and pain resolved.

## 2021-09-25 NOTE — PLAN OF CARE
Physical Therapy Discharge Summary    Reason for therapy discharge:    Discharged to home with home therapy.    Progress towards therapy goal(s). See goals on Care Plan in [facility] electronic health record for goal details.  Goals partially met.  Barriers to achieving goals:   discharge from facility.    Therapy recommendation(s):    Continued therapy is recommended.  Rationale/Recommendations:  Goals tno fully met, recommend home PT.    Katie Singletary, PT  9/25/2021

## 2021-09-30 ENCOUNTER — PATIENT OUTREACH (OUTPATIENT)
Dept: CARE COORDINATION | Facility: CLINIC | Age: 81
End: 2021-09-30

## 2021-09-30 NOTE — PROGRESS NOTES
Clinic Care Coordination Contact  Care Team Conversations    Patient identified for care management outreach, however patient is not on a value based contract so cannot complete outreach. Will escalate to clinic staff if specific needs or resources are indicated.    Edel Mckoy [facility]  Clinic Care Coordinator  [facility]   [phone]

## 2021-10-04 ENCOUNTER — LAB REQUISITION (OUTPATIENT)
Dept: LAB | Facility: CLINIC | Age: 81
End: 2021-10-04
Payer: MEDICARE

## 2021-10-04 DIAGNOSIS — K74.60 UNSPECIFIED CIRRHOSIS OF LIVER (H): ICD-10-CM

## 2021-10-04 LAB
ALBUMIN SERPL-MCNC: 3.2 G/DL (ref 3.5–5)
ALP SERPL-CCNC: 171 U/L (ref 45–120)
ALT SERPL W P-5'-P-CCNC: 25 U/L (ref 0–45)
ANION GAP SERPL CALCULATED.3IONS-SCNC: 10 MMOL/L (ref 5–18)
AST SERPL W P-5'-P-CCNC: 32 U/L (ref 0–40)
BILIRUB SERPL-MCNC: 1.5 MG/DL (ref 0–1)
BUN SERPL-MCNC: 9 MG/DL (ref 8–28)
CALCIUM SERPL-MCNC: 9.1 MG/DL (ref 8.5–10.5)
CHLORIDE BLD-SCNC: 100 MMOL/L (ref 98–107)
CO2 SERPL-SCNC: 25 MMOL/L (ref 22–31)
CREAT SERPL-MCNC: 0.97 MG/DL (ref 0.7–1.3)
GFR SERPL CREATININE-BSD FRML MDRD: 72 ML/MIN/1.73M2
GLUCOSE BLD-MCNC: 105 MG/DL (ref 70–125)
POTASSIUM BLD-SCNC: 4.2 MMOL/L (ref 3.5–5)
PROT SERPL-MCNC: 6.9 G/DL (ref 6–8)
SODIUM SERPL-SCNC: 135 MMOL/L (ref 136–145)

## 2021-10-04 PROCEDURE — 80053 COMPREHEN METABOLIC PANEL: CPT | Mod: ORL | Performed by: FAMILY MEDICINE

## 2022-03-09 ENCOUNTER — TELEPHONE (OUTPATIENT)
Dept: CARDIOLOGY | Facility: CLINIC | Age: 82
End: 2022-03-09
Payer: MEDICARE

## 2022-03-09 NOTE — TELEPHONE ENCOUNTER
I am unable to give cardiovascular clearance given that I cannot see him for a number of months.  If they feel they need to hold the Pradaxa they should hold it for short a time as possible and resume as soon as cleared because there will be a increased risk of stroke while holding the medication.  If he needs more clearance will need to be seen by a physician down there.    Gelacio Martinez MD

## 2022-03-09 NOTE — TELEPHONE ENCOUNTER
----- Message from Lidia Modi sent at 3/9/2022  9:27 AM CST -----  Regarding: [facility] PATIENT  General phone call:    Caller: LORRAINE FROM PATIENTS  DENTAL OFFICE IN  [facility]    Primary cardiologist: [facility]    Detailed reason for call: NEEDING CLEARANCE FROM [facility] FOR EXTRACTION OF A TOOTH. PLEASE CALL AND ADVISE,     Best phone number: [phone]    Best time to contact: ANY    Ok to leave a detailedmessage? YES    Device? DEVICE    Additional Info:

## 2022-03-09 NOTE — TELEPHONE ENCOUNTER
Received request from [facility] in [facility] for medical clearance, medical records, and permission to hold Pradaxa prior to tooth extraction.  Anesthesia choice will be oral surgeon's discretion.    Dr Martinez - pt last seen 7/7/21, rec was to follow-up in 3 months.  Any new recommendations?  Not sure if the fact that he is in [place] helps.  -kenisha

## 2022-07-28 ENCOUNTER — OFFICE VISIT (OUTPATIENT)
Dept: CARDIOLOGY | Facility: CLINIC | Age: 82
End: 2022-07-28
Attending: INTERNAL MEDICINE
Payer: MEDICARE

## 2022-07-28 VITALS
BODY MASS INDEX: 27.41 KG/M2 | DIASTOLIC BLOOD PRESSURE: 62 MMHG | SYSTOLIC BLOOD PRESSURE: 120 MMHG | WEIGHT: 206.8 LBS | RESPIRATION RATE: 12 BRPM | HEIGHT: 73 IN | HEART RATE: 96 BPM

## 2022-07-28 DIAGNOSIS — I50.30 HEART FAILURE WITH PRESERVED EJECTION FRACTION, NYHA CLASS I (H): ICD-10-CM

## 2022-07-28 DIAGNOSIS — I10 ESSENTIAL HYPERTENSION: ICD-10-CM

## 2022-07-28 DIAGNOSIS — I25.10 CORONARY ARTERY DISEASE DUE TO LIPID RICH PLAQUE: Primary | ICD-10-CM

## 2022-07-28 DIAGNOSIS — I48.19 PERSISTENT ATRIAL FIBRILLATION (H): ICD-10-CM

## 2022-07-28 DIAGNOSIS — I25.83 CORONARY ARTERY DISEASE DUE TO LIPID RICH PLAQUE: Primary | ICD-10-CM

## 2022-07-28 DIAGNOSIS — E78.5 HYPERLIPIDEMIA LDL GOAL <70: ICD-10-CM

## 2022-07-28 LAB
ALBUMIN SERPL-MCNC: 4 G/DL (ref 3.5–5)
ALP SERPL-CCNC: 98 U/L (ref 45–120)
ALT SERPL W P-5'-P-CCNC: 25 U/L (ref 0–45)
ANION GAP SERPL CALCULATED.3IONS-SCNC: 10 MMOL/L (ref 5–18)
AST SERPL W P-5'-P-CCNC: 24 U/L (ref 0–40)
BILIRUB SERPL-MCNC: 1.3 MG/DL (ref 0–1)
BUN SERPL-MCNC: 21 MG/DL (ref 8–28)
CALCIUM SERPL-MCNC: 9.3 MG/DL (ref 8.5–10.5)
CHLORIDE BLD-SCNC: 101 MMOL/L (ref 98–107)
CO2 SERPL-SCNC: 28 MMOL/L (ref 22–31)
CREAT SERPL-MCNC: 1.24 MG/DL (ref 0.7–1.3)
GFR SERPL CREATININE-BSD FRML MDRD: 58 ML/MIN/1.73M2
GLUCOSE BLD-MCNC: 98 MG/DL (ref 70–125)
MAGNESIUM SERPL-MCNC: 1.9 MG/DL (ref 1.8–2.6)
POTASSIUM BLD-SCNC: 4.1 MMOL/L (ref 3.5–5)
PROT SERPL-MCNC: 7.6 G/DL (ref 6–8)
SODIUM SERPL-SCNC: 139 MMOL/L (ref 136–145)

## 2022-07-28 PROCEDURE — 99214 OFFICE O/P EST MOD 30 MIN: CPT | Performed by: INTERNAL MEDICINE

## 2022-07-28 PROCEDURE — 80053 COMPREHEN METABOLIC PANEL: CPT | Performed by: INTERNAL MEDICINE

## 2022-07-28 PROCEDURE — 83735 ASSAY OF MAGNESIUM: CPT | Performed by: INTERNAL MEDICINE

## 2022-07-28 PROCEDURE — 36415 COLL VENOUS BLD VENIPUNCTURE: CPT | Performed by: INTERNAL MEDICINE

## 2022-07-28 RX ORDER — NITROGLYCERIN 0.4 MG/1
0.4 TABLET SUBLINGUAL EVERY 5 MIN PRN
Qty: 25 TABLET | Refills: 4 | Status: SHIPPED | OUTPATIENT
Start: 2022-07-28

## 2022-07-28 NOTE — LETTER
7/28/2022    Stef Sims MD  [facility] [address]  [place] [address]    RE: Ashok Garcia       Dear Colleague,     I had the pleasure of seeing Ashok Garcia in the [facility].    HEART CARE ENCOUNTER CONSULTATON NOTE      [facility]  [phone]      Assessment/Recommendations   Assessment/Plan:  1.  Coronary artery disease.  No complaints of anginal chest discomfort.  Negative nuclear stress test in 2019 without ischemia and ejection fraction 54%.  He has declined follow-up stress testing at this time but will continue to monitor for symptoms.    2.  Heart failure with preserved ejection fraction.  The most recent echocardiogram is from August 2021 where LV systolic function was said to be normal, normal right ventricular systolic function, left atrial enlargement without significant valve abnormality.  He continues on 40 mg of furosemide twice daily.  Historically previous attempts to wean the diuretic lower has resulted in increasing edema.    3.  Persistent atrial fibrillation.  Rate appears controlled.  He has been off beta-blocker secondary to some trend towards bradycardia.  Holter monitor completed August 2021 demonstrated ventricular response to be well controlled without significant bradycardia.  Moderately frequent PVCs with one 5 beat run of slower ventricular rhythm.  No sustained ventricular tachycardia.  Plan laboratory studies today as outlined.  He remains on Pradaxa.  He is aware of the watchman device or alternatives to Pradaxa.  He continues to prefer Pradaxa over the alternatives as discussed.  He is aware of the risk of bleeding.  He is using a cane to be more steady on his feet and has not had any recent falling events.    4.  Dyslipidemia chronically on simvastatin.  Lipids from August 2021 mines a cholesterol of 113, triglycerides 100,, LDL of 45.  HDL 48    Comprehensive metabolic profile and magnesium  today.  Follow-up 1 year as he mata in [place] or sooner if any specific heart related issues arise.       History of Present Illness/Subjective    HPI: Ashok Garcia is a 81 year old male who is seen in follow-up.  I have reviewed the chart records.  I note that he was hospitalized in September 2021 for acute cholecystitis and sepsis.  I reviewed follow-up notes from his primary care provider as well as follow-up laboratory studies.  He visited July 2021.  He has a history of coronary artery disease with bypass surgery in [facility] in 2008 with preoperative ejection fraction of 30 to 35% and postoperative ejection fraction of 60%.  He has had persistent atrial fibrillation since October 2016.  In 2019 he returned from [place] with symptoms of heart failure and fluid retention.  He improved with diuresis.  His BNP in August 2019 was 894.  August 2021 he underwent echocardiography that demonstrated normal systolic function, normal right ventricular function without significant valve abnormality reported.  He wore a Holter monitor that demonstrated atrial fibrillation with controlled ventricular response.  Nuclear stress test most recently completed in 2019 was said to be negative for inducible ischemia with ejection fraction of 54%.  He came off beta-blockers previously secondary to a trend towards bradycardia and atrial fibrillation.  In the past we discussed anticoagulation and the watchman device.  He spends a fair amount of time in [place].    He reports today that he has been feeling well from a cardiovascular standpoint.  He specifically denies chest discomfort, shortness of breath, orthopnea, PND or lower extremity edema.  He has been diligent about watching his diet and watching for fluid retention which she had previously experienced in 2019.  We discussed whether we would repeat a stress test as its been since 2019 that he has a remote history of bypass surgery but he is of the opinion that he  feels well and would like to hold off on follow-up stress testing.    Recent Echocardiogram Results:  Name: CINTHYA SOL  MRN: [id number]  : 1940  Study Date: 2021 12:49 PM  Age: 80 yrs  Gender: Male  Patient Location: [facility]  Reason For Study: CAD (coronary artery disease)  Ordering Physician: RAKESH WEBB  Referring Physician: RAKESH WEBB  Performed By: IFEOMA     BSA: 2.2 m2  Height: 72 in  Weight: 224 lb  HR: 67  BP: 136/78 mmHg  ______________________________________________________________________________  Procedure  Complete Echo Adult. Technically difficult study.  ______________________________________________________________________________  Interpretation Summary     The left ventricle is normal in size with mild concentric left ventricular  hypertrophy.  Left ventricular function is normal.The ejection fraction is 55-60%.  Normal right ventricle size and systolic function.  Sclerodegenerative valve disease is present without hemodynamically  significant stenosis or regurgitation.  ______________________________________________________________________________  Left Ventricle  The left ventricle is normal in size. Left ventricular function is normal.The  ejection fraction is 55-60%. There is mild concentric left ventricular  hypertrophy. Diastolic function not assessed due to atrial fibrillation. No  regional wall motion abnormalities noted.     Right Ventricle  Normal right ventricle size and systolic function.     Atria  The left atrium is moderate to severely dilated. Right atrial size is normal.  There is no color Doppler evidence of an atrial shunt.     Mitral Valve  Mitral valve leaflets appear normal. There is no evidence of mitral stenosis  or clinically significant mitral regurgitation.     Tricuspid Valve  Tricuspid valve leaflets appear normal. There is no evidence of tricuspid  stenosis or clinically significant tricuspid regurgitation. Right ventricle  systolic  pressure estimate normal. The right ventricular systolic pressure is  approximated at 20.3 mmHg plus the right atrial pressure. There is trace  tricuspid regurgitation.     Aortic Valve  The aortic valve is trileaflet with aortic valve sclerosis. No aortic  regurgitation is present. No aortic stenosis is present.     Pulmonic Valve  The pulmonic valve is not well seen, but is grossly normal. This degree of  valvular regurgitation is within normal limits. There is trace pulmonic  valvular regurgitation.     Vessels  The aorta root is normal. Normal size ascending aorta. IVC diameter <2.1 cm  collapsing >50% with sniff suggests a normal RA pressure of 3 mmHg.     Pericardium  There is no pericardial effusion.     Rhythm  The rhythm was atrial fibrillation.    Recent Coronary Angiogram Results:      Scan on 8/5/2021  8:41 AM        Conclusion    [facility]     HOLTER REPORT     Results:    Indication for study: Atrial fibrillation    The predominant rhythm throughout the tracing was atrial fibrillation with an average ventricular response of 69 bpm.  The heart rate response to activities of daily living appears to be well controlled.  The QRS duration was prolonged with a IVCD (apparently RBBB by derived twelve-lead EKG).  The QT interval was normal.  The study demonstrated no significant bradycardia/pauses.    The patient demonstrated moderately frequent ventricular ectopy accounting for 4% of the total ventricular depolarizations.  Complex ventricular ectopy was observed on 1 occasion with a 5 beat run of slow idioventricular rhythm (85 bpm).  Sustained ventricular tachycardia was not observed.    The patient return a diary.  No cardiac symptoms were reported     Impression:    Abnormal Holter monitor tracing by virtue of the finding of persistent atrial fibrillation throughout the recording.    Ventricular response in atrial fibrillation appears to be well controlled.    Conduction system disease is  present with the manifest IVCD (probably RBBB).    Increased burden of PVCs and 1 short run of idioventricular rhythm.  This did not appear to be symptomatic and the tracings do not appear overly worrisome.    No significant bradycardia or pauses.        Comment: The recording was for 23 hours and 59 minutes.  The recording quality was adequate.      8/5/19 11:21 AM 8/5/19  2:23 PM [id number] [facility]        [id number]           PACS Images     Show images for NM NURSE PHARM STRESS         Study Result    Narrative & Impression     The pharmacologic nuclear stress test is negative for inducible myocardial ischemia or infarction.    The left ventricular ejection fraction is 54% with abnormal septal motion consistent with postoperative state    When compared to the images of 5/22/2017, there has been no significant change.               Physical Examination  Review of Systems   Vitals: 120/62, weight 206 pounds, heart rate of 80s and irregular.  Wt Readings from Last 3 Encounters:   09/20/21 101.6 kg (224 lb)   07/07/21 101.8 kg (224 lb 6.4 oz)   06/08/20 98.4 kg (217 lb)       General Appearance:   no distress, normal body habitus   ENT/Mouth: 's mask in place      EYES:  no scleral icterus, normal conjunctivae   Neck: no carotid bruits or thyromegaly   Chest/Lungs:   lungs are clear to auscultation, no rales or wheezing, well-healed sternal scar, equal chest wall expansion    Cardiovascular:    Irregular . Normal first and second heart sounds with soft systolic murmur, no rubs, or gallops; the carotid, radial and posterior tibial pulses are intact, Jugular venous pressure within normal limits, trace edema bilaterally    Abdomen:  no organomegaly, masses, bruits, or tenderness; bowel sounds are present   Extremities: no cyanosis or clubbing   Skin: no xanthelasma, warm.    Neurologic: , no tremors     Psychiatric: alert and oriented x3, calm        Please refer above for cardiac ROS details.         Medical History  Surgical History Family History Social History   Past Medical History:   Diagnosis Date     CAD (coronary artery disease)      CAD (coronary artery disease)      Esophageal reflux      Esophageal reflux      Hyperlipidemia      Hyperlipidemia      Hypertension      Hypertension      Past Surgical History:   Procedure Laterality Date     ENDOSCOPIC RETROGRADE CHOLANGIOPANCREATOGRAM N/A 9/20/2021    Procedure: ENDOSCOPIC RETROGRADE CHOLANGIOPANCREATOGRAPHY, BILIARY SPHINCTEROTOMY STONE EXTRACTION.;  Surgeon: Jarrell Alvarado MD;  Location: [facility] OR     ESOPHAGOSCOPY, GASTROSCOPY, DUODENOSCOPY (EGD), COMBINED N/A 9/20/2021    Procedure: ENDOSCOPIC ULTRASOUND;  Surgeon: Jarrell Alvarado MD;  Location: [facility] OR     LAPAROSCOPIC CHOLECYSTECTOMY N/A 9/22/2021    Procedure: CHOLECYSTECTOMY, LAPAROSCOPIC WITH LIVER BIOPSY;  Surgeon: Bernardo Elizondo MD;  Location: [facility] OR     ZZC CABG, VEIN, SINGLE      Description: CABG (CABG);  Proc Date: 01/01/2008;  Comments: LIMA to LAD, SVG to diagonal and OM, radial artery to OM     No family history on file.     Social History     Socioeconomic History     Marital status:      Spouse name: Not on file     Number of children: Not on file     Years of education: Not on file     Highest education level: Not on file   Occupational History     Not on file   Tobacco Use     Smoking status: Never Smoker     Smokeless tobacco: Never Used   Substance and Sexual Activity     Alcohol use: Never     Drug use: Never     Sexual activity: Not on file   Other Topics Concern     Parent/sibling w/ CABG, MI or angioplasty before 65F 55M? Not Asked   Social History Narrative     Not on file     Social Determinants of Health     Financial Resource Strain: Not on file   Food Insecurity: Not on file   Transportation Needs: Not on file   Physical Activity: Not on file   Stress: Not on file   Social Connections: Not on file   Intimate Partner  Violence: Not on file   Housing Stability: Not on file           Medications  Allergies   Current Outpatient Medications   Medication Sig Dispense Refill     cyanocobalamin (VITAMIN B-12) 1000 MCG tablet [CYANOCOBALAMIN (VITAMIN B-12) 1000 MCG TABLET] Take 1,000 mcg by mouth daily.       doxylamine (UNISOM) 25 mg tablet [DOXYLAMINE (UNISOM) 25 MG TABLET] Take 25 mg by mouth at bedtime as needed for sleep.       folic acid (FOLVITE) 400 MCG tablet [FOLIC ACID (FOLVITE) 400 MCG TABLET] Take 400 mcg by mouth daily.       furosemide (LASIX) 40 MG tablet TAKE 1 TABLET(40 MG) BY MOUTH TWICE DAILY 180 tablet 0     losartan (COZAAR) 50 MG tablet TAKE 1 TABLET(50 MG) BY MOUTH DAILY 90 tablet 1     magnesium oxide (MAG-OX) 400 mg (241.3 mg magnesium) tablet [MAGNESIUM OXIDE (MAG-OX) 400 MG (241.3 MG MAGNESIUM) TABLET] Take 1 tablet (400 mg total) by mouth daily. 90 tablet 1     MAGNESIUM-OXIDE 400 (240 Mg) MG tablet TAKE 1 TABLET(400 MG) BY MOUTH DAILY 90 tablet 0     multivitamin therapeutic tablet [MULTIVITAMIN THERAPEUTIC TABLET] Take 1 tablet by mouth daily.       nitroglycerin (NITROSTAT) 0.4 MG SL tablet [NITROGLYCERIN (NITROSTAT) 0.4 MG SL TABLET] PLACE 1 TABLET UNDER THE TONGUE EVERY 5 MINUTES AS NEEDED FOR CHEST PAIN. 3 Bottle 3     omeprazole (PRILOSEC) 20 MG capsule [OMEPRAZOLE (PRILOSEC) 20 MG CAPSULE] Take 20 mg by mouth daily.       PRADAXA ANTICOAGULANT 150 MG capsule TAKE 1 CAPSULE BY MOUTH TWICE DAILY 180 capsule 0     simvastatin (ZOCOR) 40 MG tablet TAKE 1 TABLET(40 MG) BY MOUTH AT BEDTIME 90 tablet 0     VIRTUSSIN AC  mg/5 mL liquid Take 1 teaspoonful by mouth every 6 hours as needed for cough   2       Allergies   Allergen Reactions     Nuts - Unspecified [Nuts] Unknown          Lab Results    Chemistry/lipid CBC Cardiac Enzymes/BNP/TSH/INR   Recent Labs   Lab Test 08/26/21  1121   CHOL 113   HDL 48   LDL 45   TRIG 100     Recent Labs   Lab Test 08/26/21  1121 10/23/19  1356 05/29/19  1535   LDL 45  42 30     Recent Labs   Lab Test 10/04/21  1547   *   POTASSIUM 4.2   CHLORIDE 100   CO2 25      BUN 9   CR 0.97   GFRESTIMATED 72   SILVIO 9.1     Recent Labs   Lab Test 10/04/21  1547 09/24/21  1052 09/23/21  0907   CR 0.97 0.93 0.98     No results for input(s): A1C in the last 53404 hours.       Recent Labs   Lab Test 09/23/21  0907   WBC 8.7   HGB 12.0*   HCT 38.3*   *        Recent Labs   Lab Test 09/23/21  0907 09/21/21  0614 09/20/21  0612   HGB 12.0* 11.9* 11.2*    Recent Labs   Lab Test 09/16/21  1517   TROPONINI 0.02     Recent Labs   Lab Test 07/07/21  1445 06/10/20  1119 09/16/19  1559   * 159* 186*     Recent Labs   Lab Test 06/15/18  1047   TSH 2.69     Recent Labs   Lab Test 09/18/21  0706 09/17/21  0850   INR 1.35* 1.53*        Gelacio Martinez MD    Thank you for allowing me to participate in the care of your patient.      Sincerely,     Gelacio Martinez MD     [facility]  cc:   Gelacio Martinez MD  [address]  [address]  [place] [address]

## 2022-07-28 NOTE — PATIENT INSTRUCTIONS
Please call my nurse Kelly with any heart related questions.Please try to avoid aspirin, motrin advil.Ok to take Tylenol.Please call my nurse Kelly with any heart related questions.Please call with increased weight gain more than 3 to 5 pounds, increased leg swelling.Her number is [phone]

## 2022-07-28 NOTE — LETTER
July 29, 2022      Ashok Garcia  [address] [address]        Dear ,    We are writing to inform you of your test results.    Your test results fall within the expected range(s) or remain unchanged from previous results.  Please continue with current treatment plan.    Resulted Orders   Comprehensive metabolic panel   Result Value Ref Range    Sodium 139 136 - 145 mmol/L    Potassium 4.1 3.5 - 5.0 mmol/L    Chloride 101 98 - 107 mmol/L    Carbon Dioxide (CO2) 28 22 - 31 mmol/L    Anion Gap 10 5 - 18 mmol/L    Urea Nitrogen 21 8 - 28 mg/dL    Creatinine 1.24 0.70 - 1.30 mg/dL    Calcium 9.3 8.5 - 10.5 mg/dL    Glucose 98 70 - 125 mg/dL    Alkaline Phosphatase 98 45 - 120 U/L    AST 24 0 - 40 U/L    ALT 25 0 - 45 U/L    Protein Total 7.6 6.0 - 8.0 g/dL    Albumin 4.0 3.5 - 5.0 g/dL    Bilirubin Total 1.3 (H) 0.0 - 1.0 mg/dL    GFR Estimate 58 (L) >60 mL/min/1.73m2      Comment:      Effective December 21, 2021 eGFRcr in adults is calculated using the 2021 CKD-EPI creatinine equation which includes age and gender (Jesus et al., NEJM, DOI: 10.1056/PLRNyx0248119)   Magnesium   Result Value Ref Range    Magnesium 1.9 1.8 - 2.6 mg/dL       If you have any questions or concerns, please call the clinic at the number listed above.       Sincerely,      Gelacio Martinez MD

## 2022-07-28 NOTE — PROGRESS NOTES
HEART CARE ENCOUNTER CONSULTATON NOTE      [facility]  [phone]      Assessment/Recommendations   Assessment/Plan:  1.  Coronary artery disease.  No complaints of anginal chest discomfort.  Negative nuclear stress test in 2019 without ischemia and ejection fraction 54%.  He has declined follow-up stress testing at this time but will continue to monitor for symptoms.    2.  Heart failure with preserved ejection fraction.  The most recent echocardiogram is from August 2021 where LV systolic function was said to be normal, normal right ventricular systolic function, left atrial enlargement without significant valve abnormality.  He continues on 40 mg of furosemide twice daily.  Historically previous attempts to wean the diuretic lower has resulted in increasing edema.    3.  Persistent atrial fibrillation.  Rate appears controlled.  He has been off beta-blocker secondary to some trend towards bradycardia.  Holter monitor completed August 2021 demonstrated ventricular response to be well controlled without significant bradycardia.  Moderately frequent PVCs with one 5 beat run of slower ventricular rhythm.  No sustained ventricular tachycardia.  Plan laboratory studies today as outlined.  He remains on Pradaxa.  He is aware of the watchman device or alternatives to Pradaxa.  He continues to prefer Pradaxa over the alternatives as discussed.  He is aware of the risk of bleeding.  He is using a cane to be more steady on his feet and has not had any recent falling events.    4.  Dyslipidemia chronically on simvastatin.  Lipids from August 2021 mines a cholesterol of 113, triglycerides 100,, LDL of 45.  HDL 48    Comprehensive metabolic profile and magnesium today.  Follow-up 1 year as he mata in [place] or sooner if any specific heart related issues arise.       History of Present Illness/Subjective    HPI: Ashok MCFARLANE Radha is a 81 year old male who is seen in follow-up.  I have reviewed the  chart records.  I note that he was hospitalized in 2021 for acute cholecystitis and sepsis.  I reviewed follow-up notes from his primary care provider as well as follow-up laboratory studies.  He visited 2021.  He has a history of coronary artery disease with bypass surgery in [facility] in  with preoperative ejection fraction of 30 to 35% and postoperative ejection fraction of 60%.  He has had persistent atrial fibrillation since 2016.  In  he returned from [place] with symptoms of heart failure and fluid retention.  He improved with diuresis.  His BNP in 2019 was 894.  2021 he underwent echocardiography that demonstrated normal systolic function, normal right ventricular function without significant valve abnormality reported.  He wore a Holter monitor that demonstrated atrial fibrillation with controlled ventricular response.  Nuclear stress test most recently completed in  was said to be negative for inducible ischemia with ejection fraction of 54%.  He came off beta-blockers previously secondary to a trend towards bradycardia and atrial fibrillation.  In the past we discussed anticoagulation and the watchman device.  He spends a fair amount of time in [place].    He reports today that he has been feeling well from a cardiovascular standpoint.  He specifically denies chest discomfort, shortness of breath, orthopnea, PND or lower extremity edema.  He has been diligent about watching his diet and watching for fluid retention which she had previously experienced in 2019.  We discussed whether we would repeat a stress test as its been since 2019 that he has a remote history of bypass surgery but he is of the opinion that he feels well and would like to hold off on follow-up stress testing.    Recent Echocardiogram Results:  Name: CINTHYA SOL  MRN: [id number]  : 1940  Study Date: 2021 12:49 PM  Age: 80 yrs  Gender: Male  Patient Location:  [facility]  Reason For Study: CAD (coronary artery disease)  Ordering Physician: RAKESH WEBB  Referring Physician: RAKESH WEBB  Performed By: IFEOMA     BSA: 2.2 m2  Height: 72 in  Weight: 224 lb  HR: 67  BP: 136/78 mmHg  ______________________________________________________________________________  Procedure  Complete Echo Adult. Technically difficult study.  ______________________________________________________________________________  Interpretation Summary     The left ventricle is normal in size with mild concentric left ventricular  hypertrophy.  Left ventricular function is normal.The ejection fraction is 55-60%.  Normal right ventricle size and systolic function.  Sclerodegenerative valve disease is present without hemodynamically  significant stenosis or regurgitation.  ______________________________________________________________________________  Left Ventricle  The left ventricle is normal in size. Left ventricular function is normal.The  ejection fraction is 55-60%. There is mild concentric left ventricular  hypertrophy. Diastolic function not assessed due to atrial fibrillation. No  regional wall motion abnormalities noted.     Right Ventricle  Normal right ventricle size and systolic function.     Atria  The left atrium is moderate to severely dilated. Right atrial size is normal.  There is no color Doppler evidence of an atrial shunt.     Mitral Valve  Mitral valve leaflets appear normal. There is no evidence of mitral stenosis  or clinically significant mitral regurgitation.     Tricuspid Valve  Tricuspid valve leaflets appear normal. There is no evidence of tricuspid  stenosis or clinically significant tricuspid regurgitation. Right ventricle  systolic pressure estimate normal. The right ventricular systolic pressure is  approximated at 20.3 mmHg plus the right atrial pressure. There is trace  tricuspid regurgitation.     Aortic Valve  The aortic valve is trileaflet with aortic valve sclerosis.  No aortic  regurgitation is present. No aortic stenosis is present.     Pulmonic Valve  The pulmonic valve is not well seen, but is grossly normal. This degree of  valvular regurgitation is within normal limits. There is trace pulmonic  valvular regurgitation.     Vessels  The aorta root is normal. Normal size ascending aorta. IVC diameter <2.1 cm  collapsing >50% with sniff suggests a normal RA pressure of 3 mmHg.     Pericardium  There is no pericardial effusion.     Rhythm  The rhythm was atrial fibrillation.    Recent Coronary Angiogram Results:      Scan on 8/5/2021  8:41 AM        Conclusion    [facility]     HOLTER REPORT     Results:    Indication for study: Atrial fibrillation    The predominant rhythm throughout the tracing was atrial fibrillation with an average ventricular response of 69 bpm.  The heart rate response to activities of daily living appears to be well controlled.  The QRS duration was prolonged with a IVCD (apparently RBBB by derived twelve-lead EKG).  The QT interval was normal.  The study demonstrated no significant bradycardia/pauses.    The patient demonstrated moderately frequent ventricular ectopy accounting for 4% of the total ventricular depolarizations.  Complex ventricular ectopy was observed on 1 occasion with a 5 beat run of slow idioventricular rhythm (85 bpm).  Sustained ventricular tachycardia was not observed.    The patient return a diary.  No cardiac symptoms were reported     Impression:    Abnormal Holter monitor tracing by virtue of the finding of persistent atrial fibrillation throughout the recording.    Ventricular response in atrial fibrillation appears to be well controlled.    Conduction system disease is present with the manifest IVCD (probably RBBB).    Increased burden of PVCs and 1 short run of idioventricular rhythm.  This did not appear to be symptomatic and the tracings do not appear overly worrisome.    No significant bradycardia or  pauses.        Comment: The recording was for 23 hours and 59 minutes.  The recording quality was adequate.      8/5/19 11:21 AM 8/5/19  2:23 PM [id number] [facility]        [id number]           PACS Images     Show images for NM NURSE PHARM STRESS         Study Result    Narrative & Impression     The pharmacologic nuclear stress test is negative for inducible myocardial ischemia or infarction.    The left ventricular ejection fraction is 54% with abnormal septal motion consistent with postoperative state    When compared to the images of 5/22/2017, there has been no significant change.               Physical Examination  Review of Systems   Vitals: 120/62, weight 206 pounds, heart rate of 80s and irregular.  Wt Readings from Last 3 Encounters:   09/20/21 101.6 kg (224 lb)   07/07/21 101.8 kg (224 lb 6.4 oz)   06/08/20 98.4 kg (217 lb)       General Appearance:   no distress, normal body habitus   ENT/Mouth: 's mask in place      EYES:  no scleral icterus, normal conjunctivae   Neck: no carotid bruits or thyromegaly   Chest/Lungs:   lungs are clear to auscultation, no rales or wheezing, well-healed sternal scar, equal chest wall expansion    Cardiovascular:    Irregular . Normal first and second heart sounds with soft systolic murmur, no rubs, or gallops; the carotid, radial and posterior tibial pulses are intact, Jugular venous pressure within normal limits, trace edema bilaterally    Abdomen:  no organomegaly, masses, bruits, or tenderness; bowel sounds are present   Extremities: no cyanosis or clubbing   Skin: no xanthelasma, warm.    Neurologic: , no tremors     Psychiatric: alert and oriented x3, calm        Please refer above for cardiac ROS details.        Medical History  Surgical History Family History Social History   Past Medical History:   Diagnosis Date     CAD (coronary artery disease)      CAD (coronary artery disease)      Esophageal reflux      Esophageal reflux       Hyperlipidemia      Hyperlipidemia      Hypertension      Hypertension      Past Surgical History:   Procedure Laterality Date     ENDOSCOPIC RETROGRADE CHOLANGIOPANCREATOGRAM N/A 9/20/2021    Procedure: ENDOSCOPIC RETROGRADE CHOLANGIOPANCREATOGRAPHY, BILIARY SPHINCTEROTOMY STONE EXTRACTION.;  Surgeon: Jarrell Alvarado MD;  Location: [facility] OR     ESOPHAGOSCOPY, GASTROSCOPY, DUODENOSCOPY (EGD), COMBINED N/A 9/20/2021    Procedure: ENDOSCOPIC ULTRASOUND;  Surgeon: Jarrell Alvarado MD;  Location: [facility] OR     LAPAROSCOPIC CHOLECYSTECTOMY N/A 9/22/2021    Procedure: CHOLECYSTECTOMY, LAPAROSCOPIC WITH LIVER BIOPSY;  Surgeon: Bernardo Elizondo MD;  Location: [facility] OR     ZZC CABG, VEIN, SINGLE      Description: CABG (CABG);  Proc Date: 01/01/2008;  Comments: LIMA to LAD, SVG to diagonal and OM, radial artery to OM     No family history on file.     Social History     Socioeconomic History     Marital status:      Spouse name: Not on file     Number of children: Not on file     Years of education: Not on file     Highest education level: Not on file   Occupational History     Not on file   Tobacco Use     Smoking status: Never Smoker     Smokeless tobacco: Never Used   Substance and Sexual Activity     Alcohol use: Never     Drug use: Never     Sexual activity: Not on file   Other Topics Concern     Parent/sibling w/ CABG, MI or angioplasty before 65F 55M? Not Asked   Social History Narrative     Not on file     Social Determinants of Health     Financial Resource Strain: Not on file   Food Insecurity: Not on file   Transportation Needs: Not on file   Physical Activity: Not on file   Stress: Not on file   Social Connections: Not on file   Intimate Partner Violence: Not on file   Housing Stability: Not on file           Medications  Allergies   Current Outpatient Medications   Medication Sig Dispense Refill     cyanocobalamin (VITAMIN B-12) 1000 MCG tablet [CYANOCOBALAMIN (VITAMIN  B-12) 1000 MCG TABLET] Take 1,000 mcg by mouth daily.       doxylamine (UNISOM) 25 mg tablet [DOXYLAMINE (UNISOM) 25 MG TABLET] Take 25 mg by mouth at bedtime as needed for sleep.       folic acid (FOLVITE) 400 MCG tablet [FOLIC ACID (FOLVITE) 400 MCG TABLET] Take 400 mcg by mouth daily.       furosemide (LASIX) 40 MG tablet TAKE 1 TABLET(40 MG) BY MOUTH TWICE DAILY 180 tablet 0     losartan (COZAAR) 50 MG tablet TAKE 1 TABLET(50 MG) BY MOUTH DAILY 90 tablet 1     magnesium oxide (MAG-OX) 400 mg (241.3 mg magnesium) tablet [MAGNESIUM OXIDE (MAG-OX) 400 MG (241.3 MG MAGNESIUM) TABLET] Take 1 tablet (400 mg total) by mouth daily. 90 tablet 1     MAGNESIUM-OXIDE 400 (240 Mg) MG tablet TAKE 1 TABLET(400 MG) BY MOUTH DAILY 90 tablet 0     multivitamin therapeutic tablet [MULTIVITAMIN THERAPEUTIC TABLET] Take 1 tablet by mouth daily.       nitroglycerin (NITROSTAT) 0.4 MG SL tablet [NITROGLYCERIN (NITROSTAT) 0.4 MG SL TABLET] PLACE 1 TABLET UNDER THE TONGUE EVERY 5 MINUTES AS NEEDED FOR CHEST PAIN. 3 Bottle 3     omeprazole (PRILOSEC) 20 MG capsule [OMEPRAZOLE (PRILOSEC) 20 MG CAPSULE] Take 20 mg by mouth daily.       PRADAXA ANTICOAGULANT 150 MG capsule TAKE 1 CAPSULE BY MOUTH TWICE DAILY 180 capsule 0     simvastatin (ZOCOR) 40 MG tablet TAKE 1 TABLET(40 MG) BY MOUTH AT BEDTIME 90 tablet 0     VIRTUSSIN AC  mg/5 mL liquid Take 1 teaspoonful by mouth every 6 hours as needed for cough   2       Allergies   Allergen Reactions     Nuts - Unspecified [Nuts] Unknown          Lab Results    Chemistry/lipid CBC Cardiac Enzymes/BNP/TSH/INR   Recent Labs   Lab Test 08/26/21  1121   CHOL 113   HDL 48   LDL 45   TRIG 100     Recent Labs   Lab Test 08/26/21  1121 10/23/19  1356 05/29/19  1535   LDL 45 42 30     Recent Labs   Lab Test 10/04/21  1547   *   POTASSIUM 4.2   CHLORIDE 100   CO2 25      BUN 9   CR 0.97   GFRESTIMATED 72   SILVIO 9.1     Recent Labs   Lab Test 10/04/21  1547 09/24/21  1052 09/23/21  0907    CR 0.97 0.93 0.98     No results for input(s): A1C in the last 19854 hours.       Recent Labs   Lab Test 09/23/21  0907   WBC 8.7   HGB 12.0*   HCT 38.3*   *        Recent Labs   Lab Test 09/23/21  0907 09/21/21  0614 09/20/21  0612   HGB 12.0* 11.9* 11.2*    Recent Labs   Lab Test 09/16/21  1517   TROPONINI 0.02     Recent Labs   Lab Test 07/07/21  1445 06/10/20  1119 09/16/19  1559   * 159* 186*     Recent Labs   Lab Test 06/15/18  1047   TSH 2.69     Recent Labs   Lab Test 09/18/21  0706 09/17/21  0850   INR 1.35* 1.53*        Gelacio Martinez MD

## 2022-08-19 ENCOUNTER — TELEPHONE (OUTPATIENT)
Dept: CARDIOLOGY | Facility: CLINIC | Age: 82
End: 2022-08-19

## 2022-08-19 NOTE — TELEPHONE ENCOUNTER
Plan does not cover Pradaxa. Pls call plan at [phone] to initiate prior auth.   Pt ID # [id number].

## 2022-09-02 DIAGNOSIS — I25.10 CORONARY ATHEROSCLEROSIS: ICD-10-CM

## 2022-09-02 RX ORDER — LOSARTAN POTASSIUM 50 MG/1
TABLET ORAL
Qty: 90 TABLET | Refills: 1 | Status: SHIPPED | OUTPATIENT
Start: 2022-09-02 | End: 2023-02-27

## 2023-02-27 DIAGNOSIS — I25.10 CORONARY ATHEROSCLEROSIS: ICD-10-CM

## 2023-02-27 RX ORDER — LOSARTAN POTASSIUM 50 MG/1
TABLET ORAL
Qty: 90 TABLET | Refills: 1 | Status: SHIPPED | OUTPATIENT
Start: 2023-02-27 | End: 2024-06-18

## 2023-05-11 DIAGNOSIS — I48.91 ATRIAL FIBRILLATION (H): ICD-10-CM

## 2023-05-11 RX ORDER — ATENOLOL 100 MG/1
TABLET ORAL
Qty: 180 CAPSULE | Refills: 3 | Status: SHIPPED | OUTPATIENT
Start: 2023-05-11 | End: 2023-11-28

## 2023-05-18 ENCOUNTER — OFFICE VISIT (OUTPATIENT)
Dept: CARDIOLOGY | Facility: CLINIC | Age: 83
End: 2023-05-18
Payer: MEDICARE

## 2023-05-18 VITALS
WEIGHT: 207 LBS | DIASTOLIC BLOOD PRESSURE: 72 MMHG | HEART RATE: 64 BPM | SYSTOLIC BLOOD PRESSURE: 122 MMHG | OXYGEN SATURATION: 98 % | RESPIRATION RATE: 16 BRPM | BODY MASS INDEX: 27.31 KG/M2

## 2023-05-18 DIAGNOSIS — E78.5 HYPERLIPIDEMIA LDL GOAL <70: ICD-10-CM

## 2023-05-18 DIAGNOSIS — I48.19 PERSISTENT ATRIAL FIBRILLATION (H): ICD-10-CM

## 2023-05-18 DIAGNOSIS — I25.83 CORONARY ARTERY DISEASE DUE TO LIPID RICH PLAQUE: ICD-10-CM

## 2023-05-18 DIAGNOSIS — I25.10 CORONARY ARTERY DISEASE DUE TO LIPID RICH PLAQUE: ICD-10-CM

## 2023-05-18 PROCEDURE — 99214 OFFICE O/P EST MOD 30 MIN: CPT | Performed by: INTERNAL MEDICINE

## 2023-05-18 NOTE — PATIENT INSTRUCTIONS
Please ask Dr Rodriguez to check your chemistries, magnesium and cholesterol panel and ask that the results be forwarded to me.Please call my nurse Kelly with any heart related questions.Her number is [phone].We are going to schedule a heart ultrasound.Please call with any heart related concerns.[facility] medicine practice in [facility].Dr Boubacar Gibbs.

## 2023-05-18 NOTE — LETTER
5/18/2023    Stef Sims MD  [address]  [place] [address]    RE: Ashok Garcia       Dear Colleague,     I had the pleasure of seeing Ashok Garcia in the [facility].    HEART CARE ENCOUNTER CONSULTATON NOTE      [facility]  [phone]      Assessment/Recommendations   Assessment/Plan:  1.  Coronary artery disease.  There is noted a negative nuclear stress test in 2019.  He has declined follow-up stress testing as we have previously discussed.  He is not experiencing any symptoms of chest discomfort.  Our plan is to repeat an echocardiogram to reevaluate left ventricular function given his history of coronary artery disease and failure with preserved ejection fraction.    2.  Heart failure with preserved ejection fraction.  Most recent echocardiogram is from August 2021.  He was last seen July 2022 and then mata in [place].  He appears well compensated on today's examination.    3.  Persistent atrial fibrillation.  I have reviewed the recent Holter monitor.  He has not complained of dizziness or lightheadedness.  He continues on Pradaxa.  He does have some underlying neuropathy.  We have reviewed the Watchman device but he continues to be not enthusiastic about considering the Watchman device and knows to change positions slowly and to be careful with respect to increased risk of bleeding.  4.  Dyslipidemia.  Most recent lipids date back to August 2021 at which time total cholesterol is 113 LDL was 45.  He had a comprehensive metabolic profile that was within normal limits as well as magnesium.      1.  Echocardiogram  2.  He would like to have some blood work completed when he sees his primary care physician.  I would appreciate a chemistry profile, magnesium and a lipid panel.  He is going to bring this request to Dr. Rayray Smith at his appointment next week.  3.  Follow-up in 6 months.       History of Present Illness/Subjective    HPI:  Ashok Garcia is a 82 year old male who is seen in follow-up.  We last visited 2022.  He has a history of heart failure with preserved ejection fraction, coronary artery disease with a negative nuclear stress test most recently in  and persistent atrial fibrillation.  He mata in [place].  They had an echocardiogram 2021 where left-ventricular function was said to be normal with left atrial enlargement.  He wore a Holter monitor 2021 and demonstrated ventricular response was well controlled.  He was not having any particular symptoms.    He reports that he did well this past winter in [place].  He has had no specific cardiovascular symptoms or complaints.  He denies chest discomfort, shortness of breath, dizziness or lightheadedness.  He does note some difficulty with neuropathy of his feet.  He mentioned difficulty swallowing larger pills but no difficulty with swallowing food or liquid.    Recent Echocardiogram Results:  Echocardiogram Complete  Order: [id number]  Status: Edited Result - FINAL     Visible to patient: No (inaccessible in MyChart)     Next appt: 2023 at 12:50 PM in Cardiology (Gelacio Martinez MD)     Dx: CAD (coronary artery disease)     4 Result Notes  Details    Reading Physician Reading Date Result Priority   Chucho Cowan MD  [phone] 8/3/2021      Narrative & Impression  [id number]  [id number]  [id number]  [id number]^JON^GELACIO^[place] [address]     Name: ASHOK GARCIA  MRN: [id number]  : 1940  Study Date: 2021 12:49 PM  Age: 80 yrs  Gender: Male  Patient Location: [facility]  Reason For Study: CAD (coronary artery disease)  Ordering Physician: GELACIO MARTINEZ  Referring Physician: GELACIO MARTINEZ  Performed By: IFEOMA     BSA: 2.2 m2  Height: 72 in  Weight: 224 lb  HR: 67  BP: 136/78 mmHg  ______________________________________________________________________________  Procedure  Complete Echo Adult.  Technically difficult study.  ______________________________________________________________________________  Interpretation Summary     The left ventricle is normal in size with mild concentric left ventricular  hypertrophy.  Left ventricular function is normal.The ejection fraction is 55-60%.  Normal right ventricle size and systolic function.  Sclerodegenerative valve disease is present without hemodynamically  significant stenosis or regurgitation.  ______________________________________________________________________________  Left Ventricle  The left ventricle is normal in size. Left ventricular function is normal.The  ejection fraction is 55-60%. There is mild concentric left ventricular  hypertrophy. Diastolic function not assessed due to atrial fibrillation. No  regional wall motion abnormalities noted.     Right Ventricle  Normal right ventricle size and systolic function.     Atria  The left atrium is moderate to severely dilated. Right atrial size is normal.  There is no color Doppler evidence of an atrial shunt.     Mitral Valve  Mitral valve leaflets appear normal. There is no evidence of mitral stenosis  or clinically significant mitral regurgitation.     Tricuspid Valve  Tricuspid valve leaflets appear normal. There is no evidence of tricuspid  stenosis or clinically significant tricuspid regurgitation. Right ventricle  systolic pressure estimate normal. The right ventricular systolic pressure is  approximated at 20.3 mmHg plus the right atrial pressure. There is trace  tricuspid regurgitation.     Aortic Valve  The aortic valve is trileaflet with aortic valve sclerosis. No aortic  regurgitation is present. No aortic stenosis is present.     Pulmonic Valve  The pulmonic valve is not well seen, but is grossly normal. This degree of  valvular regurgitation is within normal limits. There is trace pulmonic  valvular regurgitation.     Vessels  The aorta root is normal. Normal size ascending aorta. IVC  diameter <2.1 cm  collapsing >50% with sniff suggests a normal RA pressure of 3 mmHg.     Pericardium  There is no pericardial effusion.     Rhythm  The rhythm was atrial fibrillation.  __________________________     Conclusion    [facility]     HOLTER REPORT     Results:  Indication for study: Atrial fibrillation  The predominant rhythm throughout the tracing was atrial fibrillation with an average ventricular response of 69 bpm.  The heart rate response to activities of daily living appears to be well controlled.  The QRS duration was prolonged with a IVCD (apparently RBBB by derived twelve-lead EKG).  The QT interval was normal.  The study demonstrated no significant bradycardia/pauses.  The patient demonstrated moderately frequent ventricular ectopy accounting for 4% of the total ventricular depolarizations.  Complex ventricular ectopy was observed on 1 occasion with a 5 beat run of slow idioventricular rhythm (85 bpm).  Sustained ventricular tachycardia was not observed.  The patient return a diary.  No cardiac symptoms were reported     Impression:  Abnormal Holter monitor tracing by virtue of the finding of persistent atrial fibrillation throughout the recording.  Ventricular response in atrial fibrillation appears to be well controlled.  Conduction system disease is present with the manifest IVCD (probably RBBB).  Increased burden of PVCs and 1 short run of idioventricular rhythm.  This did not appear to be symptomatic and the tracings do not appear overly worrisome.  No significant bradycardia or pauses.            Recent Coronary Angiogram Results:         Physical Examination  Review of Systems   Vitals: 122/72, heart rate of 64 and regular, weight 207 pounds  Wt Readings from Last 3 Encounters:   07/28/22 93.8 kg (206 lb 12.8 oz)   09/20/21 101.6 kg (224 lb)   07/07/21 101.8 kg (224 lb 6.4 oz)       General Appearance:   no distress, normal body habitus   ENT/Mouth: membranes moist, no oral  lesions or bleeding gums.      EYES:  no scleral icterus, normal conjunctivae   Neck: no carotid bruits or thyromegaly   Chest/Lungs:   lungs are clear to auscultation, no rales or wheezing, well-healed sternal scar, equal chest wall expansion    Cardiovascular:    Irregular normal first and second heart sounds with soft systolic murmur murmurs, rubs, or gallops; the carotid, radial and posterior tibial pulses are intact, Jugular venous pressure within normal limits, no significant edema bilaterally    Abdomen:  no organomegaly, masses, bruits, or tenderness; bowel sounds are present   Extremities: no cyanosis or clubbing   Skin: no xanthelasma, warm.    Neurologic:  no tremors     Psychiatric: alert and oriented x3, calm        Please refer above for cardiac ROS details.        Medical History  Surgical History Family History Social History   Past Medical History:   Diagnosis Date    CAD (coronary artery disease)     CAD (coronary artery disease)     Esophageal reflux     Esophageal reflux     Hyperlipidemia     Hyperlipidemia     Hypertension     Hypertension      Past Surgical History:   Procedure Laterality Date    ENDOSCOPIC RETROGRADE CHOLANGIOPANCREATOGRAM N/A 9/20/2021    Procedure: ENDOSCOPIC RETROGRADE CHOLANGIOPANCREATOGRAPHY, BILIARY SPHINCTEROTOMY STONE EXTRACTION.;  Surgeon: Jarrell Alvarado MD;  Location: [facility] OR    ESOPHAGOSCOPY, GASTROSCOPY, DUODENOSCOPY (EGD), COMBINED N/A 9/20/2021    Procedure: ENDOSCOPIC ULTRASOUND;  Surgeon: Jarrell Alvarado MD;  Location: [facility] OR    LAPAROSCOPIC CHOLECYSTECTOMY N/A 9/22/2021    Procedure: CHOLECYSTECTOMY, LAPAROSCOPIC WITH LIVER BIOPSY;  Surgeon: Bernardo Elizondo MD;  Location: [facility] OR    ZZ CABG, VEIN, SINGLE      Description: CABG (CABG);  Proc Date: 01/01/2008;  Comments: LIMA to LAD, SVG to diagonal and OM, radial artery to OM     No family history on file.     Social History     Socioeconomic History    Marital  status:      Spouse name: Not on file    Number of children: Not on file    Years of education: Not on file    Highest education level: Not on file   Occupational History    Not on file   Tobacco Use    Smoking status: Never    Smokeless tobacco: Never   Vaping Use    Vaping status: Not on file   Substance and Sexual Activity    Alcohol use: Never    Drug use: Never    Sexual activity: Not on file   Other Topics Concern    Parent/sibling w/ CABG, MI or angioplasty before 65F 55M? Not Asked   Social History Narrative    Not on file     Social Determinants of Health     Financial Resource Strain: Not on file   Food Insecurity: Not on file   Transportation Needs: Not on file   Physical Activity: Not on file   Stress: Not on file   Social Connections: Not on file   Intimate Partner Violence: Not on file   Housing Stability: Not on file           Medications  Allergies   Current Outpatient Medications   Medication Sig Dispense Refill    cyanocobalamin (VITAMIN B-12) 1000 MCG tablet [CYANOCOBALAMIN (VITAMIN B-12) 1000 MCG TABLET] Take 1,000 mcg by mouth daily.      doxylamine (UNISOM) 25 mg tablet [DOXYLAMINE (UNISOM) 25 MG TABLET] Take 25 mg by mouth at bedtime as needed for sleep.      folic acid (FOLVITE) 400 MCG tablet [FOLIC ACID (FOLVITE) 400 MCG TABLET] Take 400 mcg by mouth daily.      furosemide (LASIX) 40 MG tablet TAKE 1 TABLET(40 MG) BY MOUTH TWICE DAILY 180 tablet 3    losartan (COZAAR) 50 MG tablet TAKE 1 TABLET(50 MG) BY MOUTH DAILY 90 tablet 1    magnesium oxide (MAG-OX) 400 mg (241.3 mg magnesium) tablet [MAGNESIUM OXIDE (MAG-OX) 400 MG (241.3 MG MAGNESIUM) TABLET] Take 1 tablet (400 mg total) by mouth daily. 90 tablet 1    MAGNESIUM-OXIDE 400 (240 Mg) MG tablet TAKE 1 TABLET(400 MG) BY MOUTH DAILY 90 tablet 3    multivitamin therapeutic tablet [MULTIVITAMIN THERAPEUTIC TABLET] Take 1 tablet by mouth daily.      nitroGLYcerin (NITROSTAT) 0.4 MG sublingual tablet Place 1 tablet (0.4 mg) under the  tongue every 5 minutes as needed for chest pain For chest pain place 1 tablet under the tongue every 5 minutes for 3 doses. If symptoms persist 5 minutes after 1st dose call 911. 25 tablet 4    omeprazole (PRILOSEC) 20 MG capsule [OMEPRAZOLE (PRILOSEC) 20 MG CAPSULE] Take 20 mg by mouth daily.      PRADAXA ANTICOAGULANT 150 MG capsule TAKE 1 CAPSULE BY MOUTH TWICE DAILY 180 capsule 3    simvastatin (ZOCOR) 40 MG tablet TAKE 1 TABLET(40 MG) BY MOUTH AT BEDTIME 90 tablet 3    VIRTUSSIN AC  mg/5 mL liquid Take 1 teaspoonful by mouth every 6 hours as needed for cough   2       Allergies   Allergen Reactions    Nuts - Unspecified [Nuts] Unknown          Lab Results    Chemistry/lipid CBC Cardiac Enzymes/BNP/TSH/INR   Recent Labs   Lab Test 08/26/21  1121   CHOL 113   HDL 48   LDL 45   TRIG 100     Recent Labs   Lab Test 08/26/21  1121 10/23/19  1356 05/29/19  1535   LDL 45 42 30     Recent Labs   Lab Test 07/28/22  1327      POTASSIUM 4.1   CHLORIDE 101   CO2 28   GLC 98   BUN 21   CR 1.24   GFRESTIMATED 58*   SILVIO 9.3     Recent Labs   Lab Test 07/28/22  1327 10/04/21  1547 09/24/21  1052   CR 1.24 0.97 0.93     No results for input(s): A1C in the last 11339 hours.       Recent Labs   Lab Test 09/23/21  0907   WBC 8.7   HGB 12.0*   HCT 38.3*   *        Recent Labs   Lab Test 09/23/21  0907 09/21/21  0614 09/20/21  0612   HGB 12.0* 11.9* 11.2*    Recent Labs   Lab Test 09/16/21  1517   TROPONINI 0.02     Recent Labs   Lab Test 07/07/21  1445 06/10/20  1119 09/16/19  1559   * 159* 186*     Recent Labs   Lab Test 06/15/18  1047   TSH 2.69     Recent Labs   Lab Test 09/18/21  0706 09/17/21  0850   INR 1.35* 1.53*        Gelacio Martinez MD          Thank you for allowing me to participate in the care of your patient.      Sincerely,     Gelacio Martinez MD     [facility]  cc:   Gelacio Martinez MD  [address]  [facility]   [place] [address]

## 2023-05-18 NOTE — PROGRESS NOTES
HEART CARE ENCOUNTER CONSULTATON NOTE      [facility]  [phone]      Assessment/Recommendations   Assessment/Plan:  1.  Coronary artery disease.  There is noted a negative nuclear stress test in 2019.  He has declined follow-up stress testing as we have previously discussed.  He is not experiencing any symptoms of chest discomfort.  Our plan is to repeat an echocardiogram to reevaluate left ventricular function given his history of coronary artery disease and failure with preserved ejection fraction.    2.  Heart failure with preserved ejection fraction.  Most recent echocardiogram is from August 2021.  He was last seen July 2022 and then mata in [place].  He appears well compensated on today's examination.    3.  Persistent atrial fibrillation.  I have reviewed the recent Holter monitor.  He has not complained of dizziness or lightheadedness.  He continues on Pradaxa.  He does have some underlying neuropathy.  We have reviewed the Watchman device but he continues to be not enthusiastic about considering the Watchman device and knows to change positions slowly and to be careful with respect to increased risk of bleeding.  4.  Dyslipidemia.  Most recent lipids date back to August 2021 at which time total cholesterol is 113 LDL was 45.  He had a comprehensive metabolic profile that was within normal limits as well as magnesium.      1.  Echocardiogram  2.  He would like to have some blood work completed when he sees his primary care physician.  I would appreciate a chemistry profile, magnesium and a lipid panel.  He is going to bring this request to Dr. Rayray Smith at his appointment next week.  3.  Follow-up in 6 months.       History of Present Illness/Subjective    HPI: Ashok Garcia is a 82 year old male who is seen in follow-up.  We last visited July 2022.  He has a history of heart failure with preserved ejection fraction, coronary artery disease with a negative nuclear stress  test most recently in 2019 and persistent atrial fibrillation.  He mata in [place].  They had an echocardiogram 2021 where left-ventricular function was said to be normal with left atrial enlargement.  He wore a Holter monitor 2021 and demonstrated ventricular response was well controlled.  He was not having any particular symptoms.    He reports that he did well this past winter in [place].  He has had no specific cardiovascular symptoms or complaints.  He denies chest discomfort, shortness of breath, dizziness or lightheadedness.  He does note some difficulty with neuropathy of his feet.  He mentioned difficulty swallowing larger pills but no difficulty with swallowing food or liquid.    Recent Echocardiogram Results:  Echocardiogram Complete  Order: [id number]   Status: Edited Result - FINAL      Visible to patient: No (inaccessible in MyChart)      Next appt: 2023 at 12:50 PM in Cardiology (Gelacio Martinez MD)      Dx: CAD (coronary artery disease)      4 Result Notes  Details    Reading Physician Reading Date Result Priority   Chucho Cowan MD  [phone] 8/3/2021      Narrative & Impression  [id number]  [id number]  [id number]  [id number]^JON^GELACIO^[place] [address]     Name: CINTHYA SOL  MRN: [id number]  : 1940  Study Date: 2021 12:49 PM  Age: 80 yrs  Gender: Male  Patient Location: [facility]  Reason For Study: CAD (coronary artery disease)  Ordering Physician: GELACIO MARTINEZ  Referring Physician: GELACIO MARTINEZ  Performed By: IFEOMA     BSA: 2.2 m2  Height: 72 in  Weight: 224 lb  HR: 67  BP: 136/78 mmHg  ______________________________________________________________________________  Procedure  Complete Echo Adult. Technically difficult study.  ______________________________________________________________________________  Interpretation Summary     The left ventricle is normal in size with mild concentric left  ventricular  hypertrophy.  Left ventricular function is normal.The ejection fraction is 55-60%.  Normal right ventricle size and systolic function.  Sclerodegenerative valve disease is present without hemodynamically  significant stenosis or regurgitation.  ______________________________________________________________________________  Left Ventricle  The left ventricle is normal in size. Left ventricular function is normal.The  ejection fraction is 55-60%. There is mild concentric left ventricular  hypertrophy. Diastolic function not assessed due to atrial fibrillation. No  regional wall motion abnormalities noted.     Right Ventricle  Normal right ventricle size and systolic function.     Atria  The left atrium is moderate to severely dilated. Right atrial size is normal.  There is no color Doppler evidence of an atrial shunt.     Mitral Valve  Mitral valve leaflets appear normal. There is no evidence of mitral stenosis  or clinically significant mitral regurgitation.     Tricuspid Valve  Tricuspid valve leaflets appear normal. There is no evidence of tricuspid  stenosis or clinically significant tricuspid regurgitation. Right ventricle  systolic pressure estimate normal. The right ventricular systolic pressure is  approximated at 20.3 mmHg plus the right atrial pressure. There is trace  tricuspid regurgitation.     Aortic Valve  The aortic valve is trileaflet with aortic valve sclerosis. No aortic  regurgitation is present. No aortic stenosis is present.     Pulmonic Valve  The pulmonic valve is not well seen, but is grossly normal. This degree of  valvular regurgitation is within normal limits. There is trace pulmonic  valvular regurgitation.     Vessels  The aorta root is normal. Normal size ascending aorta. IVC diameter <2.1 cm  collapsing >50% with sniff suggests a normal RA pressure of 3 mmHg.     Pericardium  There is no pericardial effusion.     Rhythm  The rhythm was atrial  fibrillation.  __________________________     Conclusion    [facility]     HOLTER REPORT     Results:    Indication for study: Atrial fibrillation    The predominant rhythm throughout the tracing was atrial fibrillation with an average ventricular response of 69 bpm.  The heart rate response to activities of daily living appears to be well controlled.  The QRS duration was prolonged with a IVCD (apparently RBBB by derived twelve-lead EKG).  The QT interval was normal.  The study demonstrated no significant bradycardia/pauses.    The patient demonstrated moderately frequent ventricular ectopy accounting for 4% of the total ventricular depolarizations.  Complex ventricular ectopy was observed on 1 occasion with a 5 beat run of slow idioventricular rhythm (85 bpm).  Sustained ventricular tachycardia was not observed.    The patient return a diary.  No cardiac symptoms were reported     Impression:    Abnormal Holter monitor tracing by virtue of the finding of persistent atrial fibrillation throughout the recording.    Ventricular response in atrial fibrillation appears to be well controlled.    Conduction system disease is present with the manifest IVCD (probably RBBB).    Increased burden of PVCs and 1 short run of idioventricular rhythm.  This did not appear to be symptomatic and the tracings do not appear overly worrisome.    No significant bradycardia or pauses.            Recent Coronary Angiogram Results:         Physical Examination  Review of Systems   Vitals: 122/72, heart rate of 64 and regular, weight 207 pounds  Wt Readings from Last 3 Encounters:   07/28/22 93.8 kg (206 lb 12.8 oz)   09/20/21 101.6 kg (224 lb)   07/07/21 101.8 kg (224 lb 6.4 oz)       General Appearance:   no distress, normal body habitus   ENT/Mouth: membranes moist, no oral lesions or bleeding gums.      EYES:  no scleral icterus, normal conjunctivae   Neck: no carotid bruits or thyromegaly   Chest/Lungs:   lungs are clear to  auscultation, no rales or wheezing, well-healed sternal scar, equal chest wall expansion    Cardiovascular:    Irregular normal first and second heart sounds with soft systolic murmur murmurs, rubs, or gallops; the carotid, radial and posterior tibial pulses are intact, Jugular venous pressure within normal limits, no significant edema bilaterally    Abdomen:  no organomegaly, masses, bruits, or tenderness; bowel sounds are present   Extremities: no cyanosis or clubbing   Skin: no xanthelasma, warm.    Neurologic:  no tremors     Psychiatric: alert and oriented x3, calm        Please refer above for cardiac ROS details.        Medical History  Surgical History Family History Social History   Past Medical History:   Diagnosis Date     CAD (coronary artery disease)      CAD (coronary artery disease)      Esophageal reflux      Esophageal reflux      Hyperlipidemia      Hyperlipidemia      Hypertension      Hypertension      Past Surgical History:   Procedure Laterality Date     ENDOSCOPIC RETROGRADE CHOLANGIOPANCREATOGRAM N/A 9/20/2021    Procedure: ENDOSCOPIC RETROGRADE CHOLANGIOPANCREATOGRAPHY, BILIARY SPHINCTEROTOMY STONE EXTRACTION.;  Surgeon: Jarrell Alvarado MD;  [place] [facility] [place]     ESOPHAGOSCOPY, GASTROSCOPY, DUODENOSCOPY (EGD), COMBINED N/A 9/20/2021    Procedure: ENDOSCOPIC ULTRASOUND;  Surgeon: Jarrell Alvarado MD;  [facility] [facility] [place]     LAPAROSCOPIC CHOLECYSTECTOMY N/A 9/22/2021    Procedure: CHOLECYSTECTOMY, LAPAROSCOPIC WITH LIVER BIOPSY;  Surgeon: Bernardo Elizondo MD;  [facility] [facility] [place]     [facility] CABG, VEIN, SINGLE      Description: CABG (CABG);  Proc Date: 01/01/2008;  Comments: LIMA to LAD, SVG to diagonal and OM, radial artery to OM     No family history on file.     Social History     Socioeconomic History     Marital status:      Spouse name: Not on file     Number of children: Not on file     Years of education: Not on file     Highest education level:  Not on file   Occupational History     Not on file   Tobacco Use     Smoking status: Never     Smokeless tobacco: Never   Vaping Use     Vaping status: Not on file   Substance and Sexual Activity     Alcohol use: Never     Drug use: Never     Sexual activity: Not on file   Other Topics Concern     Parent/sibling w/ CABG, MI or angioplasty before 65F 55M? Not Asked   Social History Narrative     Not on file     Social Determinants of Health     Financial Resource Strain: Not on file   Food Insecurity: Not on file   Transportation Needs: Not on file   Physical Activity: Not on file   Stress: Not on file   Social Connections: Not on file   Intimate Partner Violence: Not on file   Housing Stability: Not on file           Medications  Allergies   Current Outpatient Medications   Medication Sig Dispense Refill     cyanocobalamin (VITAMIN B-12) 1000 MCG tablet [CYANOCOBALAMIN (VITAMIN B-12) 1000 MCG TABLET] Take 1,000 mcg by mouth daily.       doxylamine (UNISOM) 25 mg tablet [DOXYLAMINE (UNISOM) 25 MG TABLET] Take 25 mg by mouth at bedtime as needed for sleep.       folic acid (FOLVITE) 400 MCG tablet [FOLIC ACID (FOLVITE) 400 MCG TABLET] Take 400 mcg by mouth daily.       furosemide (LASIX) 40 MG tablet TAKE 1 TABLET(40 MG) BY MOUTH TWICE DAILY 180 tablet 3     losartan (COZAAR) 50 MG tablet TAKE 1 TABLET(50 MG) BY MOUTH DAILY 90 tablet 1     magnesium oxide (MAG-OX) 400 mg (241.3 mg magnesium) tablet [MAGNESIUM OXIDE (MAG-OX) 400 MG (241.3 MG MAGNESIUM) TABLET] Take 1 tablet (400 mg total) by mouth daily. 90 tablet 1     MAGNESIUM-OXIDE 400 (240 Mg) MG tablet TAKE 1 TABLET(400 MG) BY MOUTH DAILY 90 tablet 3     multivitamin therapeutic tablet [MULTIVITAMIN THERAPEUTIC TABLET] Take 1 tablet by mouth daily.       nitroGLYcerin (NITROSTAT) 0.4 MG sublingual tablet Place 1 tablet (0.4 mg) under the tongue every 5 minutes as needed for chest pain For chest pain place 1 tablet under the tongue every 5 minutes for 3 doses. If  symptoms persist 5 minutes after 1st dose call 911. 25 tablet 4     omeprazole (PRILOSEC) 20 MG capsule [OMEPRAZOLE (PRILOSEC) 20 MG CAPSULE] Take 20 mg by mouth daily.       PRADAXA ANTICOAGULANT 150 MG capsule TAKE 1 CAPSULE BY MOUTH TWICE DAILY 180 capsule 3     simvastatin (ZOCOR) 40 MG tablet TAKE 1 TABLET(40 MG) BY MOUTH AT BEDTIME 90 tablet 3     VIRTUSSIN AC  mg/5 mL liquid Take 1 teaspoonful by mouth every 6 hours as needed for cough   2       Allergies   Allergen Reactions     Nuts - Unspecified [Nuts] Unknown          Lab Results    Chemistry/lipid CBC Cardiac Enzymes/BNP/TSH/INR   Recent Labs   Lab Test 08/26/21  1121   CHOL 113   HDL 48   LDL 45   TRIG 100     Recent Labs   Lab Test 08/26/21  1121 10/23/19  1356 05/29/19  1535   LDL 45 42 30     Recent Labs   Lab Test 07/28/22  1327      POTASSIUM 4.1   CHLORIDE 101   CO2 28   GLC 98   BUN 21   CR 1.24   GFRESTIMATED 58*   SILVIO 9.3     Recent Labs   Lab Test 07/28/22  1327 10/04/21  1547 09/24/21  1052   CR 1.24 0.97 0.93     No results for input(s): A1C in the last 10442 hours.       Recent Labs   Lab Test 09/23/21  0907   WBC 8.7   HGB 12.0*   HCT 38.3*   *        Recent Labs   Lab Test 09/23/21  0907 09/21/21  0614 09/20/21  0612   HGB 12.0* 11.9* 11.2*    Recent Labs   Lab Test 09/16/21  1517   TROPONINI 0.02     Recent Labs   Lab Test 07/07/21  1445 06/10/20  1119 09/16/19  1559   * 159* 186*     Recent Labs   Lab Test 06/15/18  1047   TSH 2.69     Recent Labs   Lab Test 09/18/21  0706 09/17/21  0850   INR 1.35* 1.53*        Gelacio Martinez MD

## 2023-05-23 ENCOUNTER — TRANSFERRED RECORDS (OUTPATIENT)
Dept: HEALTH INFORMATION MANAGEMENT | Facility: CLINIC | Age: 83
End: 2023-05-23

## 2023-05-23 ENCOUNTER — LAB REQUISITION (OUTPATIENT)
Dept: LAB | Facility: CLINIC | Age: 83
End: 2023-05-23
Payer: MEDICARE

## 2023-05-23 DIAGNOSIS — I25.10 ATHEROSCLEROTIC HEART DISEASE OF NATIVE CORONARY ARTERY WITHOUT ANGINA PECTORIS: ICD-10-CM

## 2023-05-23 DIAGNOSIS — I10 ESSENTIAL (PRIMARY) HYPERTENSION: ICD-10-CM

## 2023-05-23 DIAGNOSIS — N18.2 CHRONIC KIDNEY DISEASE, STAGE 2 (MILD): ICD-10-CM

## 2023-05-23 DIAGNOSIS — E83.42 HYPOMAGNESEMIA: ICD-10-CM

## 2023-05-23 LAB
ANION GAP SERPL CALCULATED.3IONS-SCNC: 12 MMOL/L (ref 7–15)
BUN SERPL-MCNC: 17.5 MG/DL (ref 8–23)
CALCIUM SERPL-MCNC: 9.3 MG/DL (ref 8.8–10.2)
CHLORIDE SERPL-SCNC: 100 MMOL/L (ref 98–107)
CHOLEST SERPL-MCNC: 126 MG/DL
CREAT SERPL-MCNC: 1.08 MG/DL (ref 0.67–1.17)
DEPRECATED HCO3 PLAS-SCNC: 27 MMOL/L (ref 22–29)
GFR SERPL CREATININE-BSD FRML MDRD: 68 ML/MIN/1.73M2
GLUCOSE SERPL-MCNC: 108 MG/DL (ref 70–99)
HDLC SERPL-MCNC: 57 MG/DL
LDLC SERPL CALC-MCNC: 44 MG/DL
MAGNESIUM SERPL-MCNC: 2 MG/DL (ref 1.7–2.3)
NONHDLC SERPL-MCNC: 69 MG/DL
POTASSIUM SERPL-SCNC: 4.3 MMOL/L (ref 3.4–5.3)
SODIUM SERPL-SCNC: 139 MMOL/L (ref 136–145)
TRIGL SERPL-MCNC: 125 MG/DL

## 2023-05-23 PROCEDURE — 80061 LIPID PANEL: CPT | Mod: ORL | Performed by: FAMILY MEDICINE

## 2023-05-23 PROCEDURE — 80048 BASIC METABOLIC PNL TOTAL CA: CPT | Mod: ORL | Performed by: FAMILY MEDICINE

## 2023-05-23 PROCEDURE — 83735 ASSAY OF MAGNESIUM: CPT | Mod: ORL | Performed by: FAMILY MEDICINE

## 2023-09-06 ENCOUNTER — TELEPHONE (OUTPATIENT)
Dept: CARDIOLOGY | Facility: CLINIC | Age: 83
End: 2023-09-06
Payer: MEDICARE

## 2023-09-06 NOTE — TELEPHONE ENCOUNTER
Dr. Martinez, please review. Suggested that patient continue to monitor and records weights and symptoms. Offered follow-up with Laxmi, but patient opted for you to review update first. -ejb    ==  Phone call to patient. Discussed concerns. He reports that late last week he developed headache, sweats, and chills. Symptoms lasted about 24 hours and resolved. Patient also notes that weight has been fluctuating and is up from baseline about 5 pounds. He notes some mild LE swelling, but not really too much worse than usual.   Currently, patient takes Lasix 40 mg bid and wonders if med adjustments would be helpful.

## 2023-09-06 NOTE — TELEPHONE ENCOUNTER
----- Message from Lidia Modi sent at 9/6/2023 12:01 PM CDT -----  Regarding: [facility] PATIENT  General phone call:    Caller: PATIENT    Primary cardiologist: [facility]    Detailed reason for call:     New or active symptoms? PLEASE CALL QUESTIONS FOR YOU, HE DID NOT TELL WHAT QUESTIONS.     Best phone number: [phone]    Best time to contact: ANY    Ok to leave a detailedmessage? YES    Device? NO    Additional Info:

## 2023-09-07 NOTE — TELEPHONE ENCOUNTER
----- Message -----  From: Gelacio Martinez MD  Sent: 9/6/2023   5:03 PM CDT  To: Talia Mcclelland RN    I think in light of the combination of symptoms I prefer that he be seen and probably have lab work.  Do we know if he saw his primary care physician for any of those symptoms.  Please ask him to schedule the appointment M Juan    ==  Return call to patient. Reviewed response and recommendations per Dr. Martinez. Patient verbalized understanding and will reach out to PCP for appointment. Encouraged to call back with ongoing/worsening cardiac symptoms. -brettb

## 2023-10-31 ENCOUNTER — TRANSFERRED RECORDS (OUTPATIENT)
Dept: HEALTH INFORMATION MANAGEMENT | Facility: CLINIC | Age: 83
End: 2023-10-31

## 2023-11-21 ENCOUNTER — TRANSFERRED RECORDS (OUTPATIENT)
Dept: HEALTH INFORMATION MANAGEMENT | Facility: CLINIC | Age: 83
End: 2023-11-21

## 2023-11-28 DIAGNOSIS — I48.91 ATRIAL FIBRILLATION (H): ICD-10-CM

## 2023-11-28 RX ORDER — DABIGATRAN ETEXILATE 150 MG/1
150 CAPSULE ORAL 2 TIMES DAILY
Qty: 180 CAPSULE | Refills: 0 | Status: SHIPPED | OUTPATIENT
Start: 2023-11-28 | End: 2024-02-26

## 2023-11-29 ENCOUNTER — TELEPHONE (OUTPATIENT)
Dept: CARDIOLOGY | Facility: CLINIC | Age: 83
End: 2023-11-29
Payer: MEDICARE

## 2023-11-29 NOTE — TELEPHONE ENCOUNTER
[facility] Call Center    Phone Message    May a detailed message be left on voicemail: yes     Reason for Call: Other: Pt would like a call back as he is confused and concerned that the Pradaxa but was given diabigatran as he gets originally Pradaxa which is a few hundred dollars and the diabigatran is only 49 dollars and pt was informed its the same medication but he would like to discuss     Action Taken: Other: Cardio    Travel Screening: Not Applicable

## 2023-11-29 NOTE — TELEPHONE ENCOUNTER
PC to patient and review. Reassurance that Dabigatran is the generic of Pradaxa. The  generic medication has recently become available for purchase/use. Pt pleased with the cost savings, just wanted to make sure same medication. No further questions at this time. SRINIVAS,Rn

## 2023-12-12 ENCOUNTER — HOSPITAL ENCOUNTER (OUTPATIENT)
Dept: RADIOLOGY | Facility: HOSPITAL | Age: 83
Discharge: HOME | End: 2023-12-12
Attending: FAMILY MEDICINE
Payer: MEDICARE

## 2023-12-12 ENCOUNTER — HOSPITAL ENCOUNTER (OUTPATIENT)
Dept: SPEECH THERAPY | Facility: HOSPITAL | Age: 83
Discharge: HOME | End: 2023-12-12
Payer: MEDICARE

## 2023-12-12 DIAGNOSIS — R13.12 OROPHARYNGEAL DYSPHAGIA: ICD-10-CM

## 2023-12-12 PROCEDURE — 92611 MOTION FLUOROSCOPY/SWALLOW: CPT | Mod: GN,XU

## 2023-12-12 PROCEDURE — 74230 X-RAY XM SWLNG FUNCJ C+: CPT

## 2023-12-12 PROCEDURE — 92610 EVALUATE SWALLOWING FUNCTION: CPT | Mod: GN

## 2023-12-12 RX ORDER — BARIUM SULFATE 400 MG/ML
SUSPENSION ORAL ONCE
Status: COMPLETED | OUTPATIENT
Start: 2023-12-12 | End: 2023-12-12

## 2023-12-12 RX ADMIN — BARIUM SULFATE 20 ML: 400 SUSPENSION ORAL at 10:54

## 2023-12-12 RX ADMIN — BARIUM SULFATE 60 ML: 400 SUSPENSION ORAL at 10:54

## 2023-12-12 NOTE — PROGRESS NOTES
"SPEECH LANGUAGE PATHOLOGY EVALUATION    See electronic medical record for Abuse and Falls Screening details.    Subjective     Presenting condition or subjective complaint: altered diet of puree textures, hopeful for upgrade  Date of onset: 11/1/23    MD: Dr. Sims  Relevant medical history: Patient is a 83 year old male with recent hospitalization at [facility] for aspiration pneumonia and was discharged home to hospice. Patient reportedly had hospice consult at home and hospice was deferred. Patient arrives to exam hopeful to advance from pureed textures to more solid foods. He reports having pureed food delivered/ordered or prepared and thin liquids. He denies any recent fevers or congestion. He denies overt concerns with swallowing at meals. He reports his goals are consistent with restorative measures and completes physical exercises daily. He does not express openness to thickened liquids per brief conversation but is open to strategies, exercises and altered foods beyond puree.   Previous VFSS on 11/1/23 showed: severe oropharyngeal dysphagia with high risk prandial aspiration over time with all PO textures. Deep laryngeal penetration observed with mildly thick liquids by independent cup sip. Material that entered the airway was from severe diffuse pharyngeal pooling during spontaneous subsequent swallows. \"Retrograde flow\" within the pharynx was observed with mildly thick liquids by cup with material moving back towards the tongue base from the pharyngeal sinuses with subsequent swallows. Pt did not demonstrate evidence of sensation in his laryngeal vestibule, material did not clear, and is at high risk of being aspirated over time. Laryngeal penetration was minimized with mildly thick and thin liquids with small volumes by spoon, however small amounts of material was visualized entering the airway from the pyriform sinus without evidence of sensation. Pudding thick material resulted in severe oral, tongue " base, and pharyngeal sinus residue after the swallow which pt made no attempt to minimize without cues. Liquid washes by spoon and cues for effortful and repeated swallows did reduce pharyngeal residue to within the moderate range, but risk for aspiration over time remains high.     SLP discharge summary from [facility]: Pt seen upright in bed. Pt met with palliative this AM and changed status to DNR and will plan to d/c home on hospice. Pt expressed desire to eat orally and does NOT want tube feeding. Based on VFSS results, puree and thin liquids were the best options with risk. SLP provided education and instruction on the following strategies; swallow multiple times per bolus and volitional throat clear. Trials of thin by spoon and cup, and puree were completed. Pt demonstrated no overt cough or s/s of aspiration with thin by spoon. Delayed cough noted after all trials were completed, but overall swallow appeared functional with puree and thin by cup. Given pt's desire to eat by mouth with known risks, SLP recommending IDDSI-4 Puree diet and thin liquids, NO Straws. SLP will continue to follow to facilitate oropharyngeal exercises.     Objective     SWALLOW EVALUATION  Current Diet/Method of Nutritional Intake: thin liquids (level 0), pureed (level 4)      CLINICAL SWALLOW EVALUATION  Clinical swallow evaluation completed prior to VFSS via records review, clinical interview and oral motor examination.   Oral Motor Function: generally intact  Oral labial function: WFL  Lingual function: WFL  Buccal function: intact   Head/neck position noted to be in a tight/tucked posture. He was able to lift head to a more neutral position with cues.      VIDEOFLUOROSCOPIC SWALLOW STUDY  Radiologist: Dr. Dupont  Views Taken: left lateral   Physical location of procedure: [facility] [place]  Patient sitting upright on chair/stool     VFSS textures trialed:   VFSS Eval: Thin Liquids  Mode of Presentation: cup, spoon, straw, self-fed,  fed by clinician   Preparatory Phase:  reduced bolus control  Oral Phase: WFL, premature pharyngeal entry  Bolus Location When Swallow Initiated: pyriforms  Pharyngeal Phase: impaired epiglottic movement, impaired pharyngoesophageal segment opening, residue in valleculae, residue in pyriform sinus  Rosenbeck's Penetration Aspiration Scale: 8 - contrast passes glottis, visible subglottic residue remains, absent patient response (aspiration); difficult to fully view - appears to have at least trace, delayed aspiration without consistent spontaneous cough response    VFSS Eval: Mildly Thick Liquids  Mode of Presentation: cup, spoon, fed by clinician   Preparatory Phase: WFL  Oral Phase: WFL  Bolus Location When Swallow Initiated: pyriforms  Pharyngeal Phase: impaired hyolaryngel excursion, impaired epiglottic movement, impaired pharyngoesophageal segment opening, residue in valleculae, residue in pyriform sinus  Rosenbeck's Penetration Aspiration Scale: 3 - contrast remains above the vocal cords, visible residue remains (penetration)    VFSS Eval: Purees  Mode of Presentation: spoon, self-fed, fed by clinician   Preparatory Phase: WFL  Oral Phase: WFL  Bolus Location When Swallow Initiated: valleculae  Pharyngeal Phase: impaired hyolaryngel excursion, impaired epiglottic movement, impaired pharyngoesophageal segment opening, residue in valleculae, residue in pyriform sinus  Rosenbeck s Penetration Aspiration Scale: 1 - no aspiration, contrast does not enter airway    VFSS Eval: Solids  Mode of Presentation: self-fed   Preparatory Phase: WFL  Oral Phase: WFL  Bolus Location When Swallow Initiated: valleculae  Pharyngeal Phase: impaired hyolaryngel excursion, impaired epiglottic movement, impaired pharyngoesophageal segment opening, residue in valleculae, residue in pyriform sinus   Rosenbeck s Penetration Aspiration Scale: 1 - no aspiration, contrast does not enter airway    ESOPHAGEAL PHASE OF SWALLOW  no observed or  reported concerns related to esophageal function     SWALLOW ASSESSMENT CLINICAL IMPRESSIONS AND RATIONALE  Diet Consistency Recommendations: thin liquids (level 0), soft & bite-sized (level 6) appears to be the safest diet level at this time while maintaining quality of life  Recommended Feeding/Eating Techniques: slow rate of intake, alternate food and liquid intake, double swallow, effortful (hard) swallow, maintain upright sitting position for eating, maintain upright posture during/after eating for 30 minutes   Medication Administration Recommendations: consider crushed in puree   Instrumental Assessment Recommendations: VFSS (videofluoroscopic swallowing study)- consider repeat in 6-8 weeks pending progress with OP ST    Assessment & Plan   CLINICAL IMPRESSIONS   Medical Diagnosis: Dysphagia    Treatment Diagnosis: Dysphagia   Impression/Assessment:   Videofluoroscopic Swallow Study completed. Patient had eventual, trace aspiration with intake, not directly observed but suspect due to trickle down from stasis; unclear if any specific texture, suspect mildly thick and thin.  Inconsistent penetration with thin and mildly thick liquids with laryngeal vestibule residuals. Oral phase is slow but functional. Tongue base retraction was adequate. Swallow response was minimally delayed. Epiglottic inversion was posterior to posterior inferior.  Mild, increasing to moderate stasis occurred with puree and solids, mild stasis with thin and mildly thick; No clear increase in stasis with solids versus puree. Hyolaryngeal elevation was adequate and hyolaryngeal excursion was reduced.  Mastication was slow but complete. Cricopharyngeus was difficult to view but noted to be highly prominent and suspicious for cricopharyngeal bar as noted on previous VFSS. Chin tuck strategy was ineffective at increasing swallow safety. Patient did significantly benefit from intentional lifting of his head/neck to upgright or neutral position.  This resulted in increased clearing of residuals and widened pyriform space for increased area for pooling. There is no apparent benefit from mildly thick over thin liquids at this time. Moderately thick was not trialed due to impact of stasis. Patient does remain at risk for aspiration and aspiration related complications, however, he is motivated to continue oral intake with some reasonable risks. If patient elects a more conservative approach to dysphagia management, a tube feeding discussion could be appropriate. A selective and reasonably safe approach would be to upgrade to Soft and Bite Sized diet with Thin liquids and strict use of head upright, double swallow with every bite/sip and alternate solids and liquids. With extensive education on risks and benefits, patient does appear satisfied with option of SB and Thin liquids with strict use of strategies. He does appear likely to benefit from pharyngeal exercises program and was provided handouts for CTAR and hyolaryngeal exercises. He may benefit from the Shaker exercise under guidance of an SLP. ST is recommended via homecare or as an OP to establish a full pharyngeal exercise program and ongoing instruction of safe swallow strategies to reduce risk of aspiration and aspiration related complications.     PLAN OF CARE    Recommended Referrals to Other Professionals: Speech Language Pathology  Education Assessment:   Learner/Method: Patient;Family;Listening;No Barriers to Learning;Demonstration  Education Comments: VFSS protocol, results and recommendations    Risks and benefits of evaluation/treatment have been explained.   Patient/Family/caregiver agrees with Plan of Care.     Evaluation Time:    SLP Eval: oral/pharyngeal swallow function, clinical minutes (83768): 8  SLP Eval: VideoFluoroscopic Swallow function Minutes (54625): 15      Signing Clinician: ALANNAH Cleaning [facility]      Initial Assessment  See Epic  Evaluation-

## 2024-02-24 DIAGNOSIS — I48.91 ATRIAL FIBRILLATION (H): ICD-10-CM

## 2024-02-26 RX ORDER — DABIGATRAN ETEXILATE 150 MG/1
CAPSULE ORAL
Qty: 180 CAPSULE | Refills: 0 | Status: SHIPPED | OUTPATIENT
Start: 2024-02-26 | End: 2024-05-29

## 2024-02-26 NOTE — TELEPHONE ENCOUNTER
Pt last seen 5/18/23 - rec 6 mo follow-up.  Pt scheduled 4/5/24 with Dr Ochoa, rx filled to appt.  -ral

## 2024-04-05 ENCOUNTER — OFFICE VISIT (OUTPATIENT)
Dept: CARDIOLOGY | Facility: CLINIC | Age: 84
End: 2024-04-05
Payer: MEDICARE

## 2024-04-05 VITALS
RESPIRATION RATE: 18 BRPM | OXYGEN SATURATION: 96 % | BODY MASS INDEX: 28.1 KG/M2 | WEIGHT: 213 LBS | SYSTOLIC BLOOD PRESSURE: 124 MMHG | HEART RATE: 76 BPM | DIASTOLIC BLOOD PRESSURE: 73 MMHG

## 2024-04-05 DIAGNOSIS — I51.9 LV DYSFUNCTION: Primary | ICD-10-CM

## 2024-04-05 DIAGNOSIS — I25.10 CORONARY ARTERY DISEASE INVOLVING NATIVE CORONARY ARTERY OF NATIVE HEART WITHOUT ANGINA PECTORIS: ICD-10-CM

## 2024-04-05 DIAGNOSIS — I48.21 PERMANENT ATRIAL FIBRILLATION (H): ICD-10-CM

## 2024-04-05 DIAGNOSIS — Z79.01 CHRONIC ANTICOAGULATION: ICD-10-CM

## 2024-04-05 PROCEDURE — 99214 OFFICE O/P EST MOD 30 MIN: CPT | Performed by: STUDENT IN AN ORGANIZED HEALTH CARE EDUCATION/TRAINING PROGRAM

## 2024-04-05 PROCEDURE — G2211 COMPLEX E/M VISIT ADD ON: HCPCS | Performed by: STUDENT IN AN ORGANIZED HEALTH CARE EDUCATION/TRAINING PROGRAM

## 2024-04-05 NOTE — LETTER
4/5/2024    Stef Sims MD  [address]  [place] [address]    RE: Ashok Garcia       Dear Colleague,     I had the pleasure of seeing Ashok Garcia in the [facility].    [facility]   [address]  [place] [address]   [email or web address]   OFFICE: [phone]     CARDIOLOGY CLINIC NOTE     Thank you, Dr. Sims, Stef Brown, for asking the [facility] team to see Mr. Ashok Garcia to evaluate Follow Up          History of Present Illness   Mr. Ashok Garcia is a 83 year old male with a significant past history of permanent afib, CAD s/p CABG, HLD, HTN, chronic HFpEF with declined in LVEF in 11/2023 during hospitalization,  who presents for routine follow up.  The patient notes he was admitted to the hospital at [facility] with COVID in October 2023.  He apparently also aspirated.  He was eventually discharged on home hospice but hospice services noted he did not qualify for hospice.  He did end up getting home health services which he continues.  He apparently failed a swallow evaluation but has slowly worked himself back to eating normal foods except he continues to avoid meats like steak.  He notes no new symptoms.  He walks with a walker due to neuropathy.  He usually mata in [place], did not do it this year due to the illness, but would like to go back this October if possible.      Other than noted above, Mr. Garcia denies any chest pain/pressure/tightness, shortness of breath at rest or with exertion, light headedness/dizziness, pre-syncope, syncope, lower extremity swelling, palpitations, paroxysmal nocturnal dyspnea (PND), or orthopnea.       Medications  Allergies   Current Outpatient Medications   Medication Sig Dispense Refill    cyanocobalamin (VITAMIN B-12) 1000 MCG tablet [CYANOCOBALAMIN (VITAMIN B-12) 1000 MCG TABLET] Take 1,000 mcg by mouth daily.      dabigatran  ANTICOAGULANT (PRADAXA) 150 MG capsule TAKE 1 CAPSULE BY MOUTH TWICE DAILY( EVERY TWELVE HOURS) 180 capsule 0    doxylamine (UNISOM) 25 mg tablet [DOXYLAMINE (UNISOM) 25 MG TABLET] Take 25 mg by mouth at bedtime as needed for sleep.      folic acid (FOLVITE) 400 MCG tablet [FOLIC ACID (FOLVITE) 400 MCG TABLET] Take 400 mcg by mouth daily.      furosemide (LASIX) 40 MG tablet TAKE 1 TABLET(40 MG) BY MOUTH TWICE DAILY 180 tablet 3    losartan (COZAAR) 50 MG tablet TAKE 1 TABLET(50 MG) BY MOUTH DAILY 90 tablet 1    MAGNESIUM-OXIDE 400 (240 Mg) MG tablet TAKE 1 TABLET(400 MG) BY MOUTH DAILY 90 tablet 3    multivitamin therapeutic tablet [MULTIVITAMIN THERAPEUTIC TABLET] Take 1 tablet by mouth daily.      nitroGLYcerin (NITROSTAT) 0.4 MG sublingual tablet Place 1 tablet (0.4 mg) under the tongue every 5 minutes as needed for chest pain For chest pain place 1 tablet under the tongue every 5 minutes for 3 doses. If symptoms persist 5 minutes after 1st dose call 911. 25 tablet 4    omeprazole (PRILOSEC) 20 MG capsule [OMEPRAZOLE (PRILOSEC) 20 MG CAPSULE] Take 20 mg by mouth daily.      simvastatin (ZOCOR) 40 MG tablet TAKE 1 TABLET(40 MG) BY MOUTH AT BEDTIME 90 tablet 3    VIRTUSSIN AC  mg/5 mL liquid Take 1 teaspoonful by mouth every 6 hours as needed for cough   2      Allergies   Allergen Reactions    Nuts - Unspecified [Nuts] Unknown        Physical Examination Review of Systems   Vitals: There were no vitals taken for this visit.  BMI= There is no height or weight on file to calculate BMI.  Wt Readings from Last 3 Encounters:   05/18/23 93.9 kg (207 lb)   07/28/22 93.8 kg (206 lb 12.8 oz)   09/20/21 101.6 kg (224 lb)       General: pleasant male. No acute distress.   Neck: No JVD  Lungs: clear to auscultation  COR:  regular rate and rhythm, No murmurs, rubs, or gallops  Extrem: No edema        Please refer above for cardiac ROS details.       Past History     Family History: No family history on file.     Social  History:   Social History     Socioeconomic History    Marital status:      Spouse name: Not on file    Number of children: Not on file    Years of education: Not on file    Highest education level: Not on file   Occupational History    Not on file   Tobacco Use    Smoking status: Never    Smokeless tobacco: Never   Substance and Sexual Activity    Alcohol use: Never    Drug use: Never    Sexual activity: Not on file   Other Topics Concern    Parent/sibling w/ CABG, MI or angioplasty before 65F 55M? Not Asked   Social History Narrative    Not on file     Social Determinants of Health     Financial Resource Strain: Not on file   Food Insecurity: Not on file   Transportation Needs: Not on file   Physical Activity: Not on file   Stress: Not on file   Social Connections: Not on file   Interpersonal Safety: Not on file   Housing Stability: Not on file            Lab Results    Chemistry/lipid CBC Cardiac Enzymes/BNP/TSH/INR   Lab Results   Component Value Date    CHOL 126 05/23/2023    HDL 57 05/23/2023    TRIG 125 05/23/2023    BUN 17.5 05/23/2023     05/23/2023    CO2 27 05/23/2023    Lab Results   Component Value Date    WBC 8.7 09/23/2021    HGB 12.0 (L) 09/23/2021    HCT 38.3 (L) 09/23/2021     (H) 09/23/2021     09/23/2021    Lab Results   Component Value Date    TROPONINI 0.02 09/16/2021     (H) 07/07/2021    TSH 2.69 06/15/2018    INR 1.35 (H) 09/18/2021          Cardiac Problems and Cardiac Diagnostics     Most Recent Cardiac testing:  ECG Personal interpretation  7/26/2019  Afib  RBBB, LAD    HOLTER REPORT 8/3/2021  Results:  Indication for study: Atrial fibrillation  The predominant rhythm throughout the tracing was atrial fibrillation with an average ventricular response of 69 bpm.  The heart rate response to activities of daily living appears to be well controlled.  The QRS duration was prolonged with a IVCD (apparently RBBB by derived twelve-lead EKG).  The QT interval was  normal.  The study demonstrated no significant bradycardia/pauses.  The patient demonstrated moderately frequent ventricular ectopy accounting for 4% of the total ventricular depolarizations.  Complex ventricular ectopy was observed on 1 occasion with a 5 beat run of slow idioventricular rhythm (85 bpm).  Sustained ventricular tachycardia was not observed.  The patient return a diary.  No cardiac symptoms were reported     Impression:  Abnormal Holter monitor tracing by virtue of the finding of persistent atrial fibrillation throughout the recording.  Ventricular response in atrial fibrillation appears to be well controlled.  Conduction system disease is present with the manifest IVCD (probably RBBB).  Increased burden of PVCs and 1 short run of idioventricular rhythm.  This did not appear to be symptomatic and the tracings do not appear overly worrisome.  No significant bradycardia or pauses.    Echo 10/3/2023 ([facility])   Final Conclusion    Visually Estimated EF: 40-45%    Technically difficult study - contrast was used to enhance endocardial definition due to   suboptimal image quality.    Despite contrast use limited diagnostic data is obtainable.    Normal LV size with mild to moderate global LV systolic dysfunction (estimated ejection   fraction of 40-45%).    LAE.    Aortic valve sclerosis without significant stenosis.    Mild mitral regurgitation.    Estimated pulmonary artery pressure of 39 mmHg + RA pressure.    Dilated IVC.     ECHO 8/3/2021  Interpretation Summary     The left ventricle is normal in size with mild concentric left ventricular  hypertrophy.  Left ventricular function is normal.The ejection fraction is 55-60%.  Normal right ventricle size and systolic function.  Sclerodegenerative valve disease is present without hemodynamically  significant stenosis or regurgitation.         Assessment/Recommendations   Assessment:    Mr. Ashok Garcia is a 83 year old male with a significant past  history of permanent afib, CAD s/p CABG, HLD, HTN, chronic HFpEF with declined in LVEF in 11/2023 during hospitalization,  who presents for routine follow up.     Permanent afib   - Dates back to at least 2016   - Will not make any attempts at sinus rhythm   - Cont dabigatran for elevated CHADS-Vasc of 5   - Rate control is adequate    LV dysfunction possible stress CM   - Euvolemic today.  Wears compression stockings.   - LVEF noted to be 40-45% while hospitalized for COVID and aspiration pna.  Noted to be a difficult study   - Repeat TTE.  If continues to be depressed discuss whether or not to pursue LHC vs. Intensification of GDMT.  He is not sure at the moment if he would want to pursue LHC   - Cont losartan.  Not on BB due to bradycardia   - Cont lasix    CAD   - No anginal symptoms   - Cont simvastatin last LDL 44    RTC 6 months or sooner if repeat TTE abnormal    The longitudinal plan of care for afib was addressed during this visit. Due to the added complexity in care, I will continue to support Ashok Garcia  in the subsequent management of this condition(s) and with the ongoing continuity of care of this condition(s).           Haja Ochoa DO [facility]  Non-invasive Cardiologist  [facility]     Thank you for allowing me to participate in the care of your patient.      Sincerely,     Haja Ochoa DO     [facility]  cc:   Stef Sims MD  [address] [place] [address]

## 2024-04-05 NOTE — PATIENT INSTRUCTIONS
It was a pleasure meeting you today    In summary:    We will get an echocardiogram to follow up on the heart function.    Please call my nurse Aaron Washington at [phone] if you have any questions or issues.    We will schedule a follow up visit in  6 months      Haja Ochoa DO [facility]  Non-invasive Cardiologist  [facility]

## 2024-04-05 NOTE — PROGRESS NOTES
[facility] HEART CARE   [address]  [place] [address]   [email or web address]   OFFICE: [phone]     CARDIOLOGY CLINIC NOTE     Thank you, Dr. Sims, Stef Brown, for asking the [facility] team to see Mr. Ashok Garcia to evaluate Follow Up          History of Present Illness   Mr. Ashok aGrcia is a 83 year old male with a significant past history of permanent afib, CAD s/p CABG, HLD, HTN, chronic HFpEF with declined in LVEF in 11/2023 during hospitalization,  who presents for routine follow up.  The patient notes he was admitted to the hospital at [facility] with COVID in October 2023.  He apparently also aspirated.  He was eventually discharged on home hospice but hospice services noted he did not qualify for hospice.  He did end up getting home health services which he continues.  He apparently failed a swallow evaluation but has slowly worked himself back to eating normal foods except he continues to avoid meats like steak.  He notes no new symptoms.  He walks with a walker due to neuropathy.  He usually mata in [place], did not do it this year due to the illness, but would like to go back this October if possible.      Other than noted above, Mr. Garcia denies any chest pain/pressure/tightness, shortness of breath at rest or with exertion, light headedness/dizziness, pre-syncope, syncope, lower extremity swelling, palpitations, paroxysmal nocturnal dyspnea (PND), or orthopnea.       Medications  Allergies   Current Outpatient Medications   Medication Sig Dispense Refill    cyanocobalamin (VITAMIN B-12) 1000 MCG tablet [CYANOCOBALAMIN (VITAMIN B-12) 1000 MCG TABLET] Take 1,000 mcg by mouth daily.      dabigatran ANTICOAGULANT (PRADAXA) 150 MG capsule TAKE 1 CAPSULE BY MOUTH TWICE DAILY( EVERY TWELVE HOURS) 180 capsule 0    doxylamine (UNISOM) 25 mg tablet [DOXYLAMINE (UNISOM) 25 MG TABLET] Take 25 mg by mouth at bedtime as needed  No c/o at this time  Sister remains at bedside         Jael Gandhi RN  10/25/17 3720 for sleep.      folic acid (FOLVITE) 400 MCG tablet [FOLIC ACID (FOLVITE) 400 MCG TABLET] Take 400 mcg by mouth daily.      furosemide (LASIX) 40 MG tablet TAKE 1 TABLET(40 MG) BY MOUTH TWICE DAILY 180 tablet 3    losartan (COZAAR) 50 MG tablet TAKE 1 TABLET(50 MG) BY MOUTH DAILY 90 tablet 1    MAGNESIUM-OXIDE 400 (240 Mg) MG tablet TAKE 1 TABLET(400 MG) BY MOUTH DAILY 90 tablet 3    multivitamin therapeutic tablet [MULTIVITAMIN THERAPEUTIC TABLET] Take 1 tablet by mouth daily.      nitroGLYcerin (NITROSTAT) 0.4 MG sublingual tablet Place 1 tablet (0.4 mg) under the tongue every 5 minutes as needed for chest pain For chest pain place 1 tablet under the tongue every 5 minutes for 3 doses. If symptoms persist 5 minutes after 1st dose call 911. 25 tablet 4    omeprazole (PRILOSEC) 20 MG capsule [OMEPRAZOLE (PRILOSEC) 20 MG CAPSULE] Take 20 mg by mouth daily.      simvastatin (ZOCOR) 40 MG tablet TAKE 1 TABLET(40 MG) BY MOUTH AT BEDTIME 90 tablet 3    VIRTUSSIN AC  mg/5 mL liquid Take 1 teaspoonful by mouth every 6 hours as needed for cough   2      Allergies   Allergen Reactions    Nuts - Unspecified [Nuts] Unknown        Physical Examination Review of Systems   Vitals: There were no vitals taken for this visit.  BMI= There is no height or weight on file to calculate BMI.  Wt Readings from Last 3 Encounters:   05/18/23 93.9 kg (207 lb)   07/28/22 93.8 kg (206 lb 12.8 oz)   09/20/21 101.6 kg (224 lb)       General: pleasant male. No acute distress.   Neck: No JVD  Lungs: clear to auscultation  COR:  regular rate and rhythm, No murmurs, rubs, or gallops  Extrem: No edema        Please refer above for cardiac ROS details.       Past History     Family History: No family history on file.     Social History:   Social History     Socioeconomic History    Marital status:      Spouse name: Not on file    Number of children: Not on file    Years of education: Not on file    Highest education level: Not on file    Occupational History    Not on file   Tobacco Use    Smoking status: Never    Smokeless tobacco: Never   Substance and Sexual Activity    Alcohol use: Never    Drug use: Never    Sexual activity: Not on file   Other Topics Concern    Parent/sibling w/ CABG, MI or angioplasty before 65F 55M? Not Asked   Social History Narrative    Not on file     Social Determinants of Health     Financial Resource Strain: Not on file   Food Insecurity: Not on file   Transportation Needs: Not on file   Physical Activity: Not on file   Stress: Not on file   Social Connections: Not on file   Interpersonal Safety: Not on file   Housing Stability: Not on file            Lab Results    Chemistry/lipid CBC Cardiac Enzymes/BNP/TSH/INR   Lab Results   Component Value Date    CHOL 126 05/23/2023    HDL 57 05/23/2023    TRIG 125 05/23/2023    BUN 17.5 05/23/2023     05/23/2023    CO2 27 05/23/2023    Lab Results   Component Value Date    WBC 8.7 09/23/2021    HGB 12.0 (L) 09/23/2021    HCT 38.3 (L) 09/23/2021     (H) 09/23/2021     09/23/2021    Lab Results   Component Value Date    TROPONINI 0.02 09/16/2021     (H) 07/07/2021    TSH 2.69 06/15/2018    INR 1.35 (H) 09/18/2021          Cardiac Problems and Cardiac Diagnostics     Most Recent Cardiac testing:  ECG Personal interpretation  7/26/2019  Afib  RBBB, LAD    HOLTER REPORT 8/3/2021  Results:  Indication for study: Atrial fibrillation  The predominant rhythm throughout the tracing was atrial fibrillation with an average ventricular response of 69 bpm.  The heart rate response to activities of daily living appears to be well controlled.  The QRS duration was prolonged with a IVCD (apparently RBBB by derived twelve-lead EKG).  The QT interval was normal.  The study demonstrated no significant bradycardia/pauses.  The patient demonstrated moderately frequent ventricular ectopy accounting for 4% of the total ventricular depolarizations.  Complex ventricular  ectopy was observed on 1 occasion with a 5 beat run of slow idioventricular rhythm (85 bpm).  Sustained ventricular tachycardia was not observed.  The patient return a diary.  No cardiac symptoms were reported     Impression:  Abnormal Holter monitor tracing by virtue of the finding of persistent atrial fibrillation throughout the recording.  Ventricular response in atrial fibrillation appears to be well controlled.  Conduction system disease is present with the manifest IVCD (probably RBBB).  Increased burden of PVCs and 1 short run of idioventricular rhythm.  This did not appear to be symptomatic and the tracings do not appear overly worrisome.  No significant bradycardia or pauses.    Echo 10/3/2023 ([facility])   Final Conclusion    Visually Estimated EF: 40-45%    Technically difficult study - contrast was used to enhance endocardial definition due to   suboptimal image quality.    Despite contrast use limited diagnostic data is obtainable.    Normal LV size with mild to moderate global LV systolic dysfunction (estimated ejection   fraction of 40-45%).    LAE.    Aortic valve sclerosis without significant stenosis.    Mild mitral regurgitation.    Estimated pulmonary artery pressure of 39 mmHg + RA pressure.    Dilated IVC.     ECHO 8/3/2021  Interpretation Summary     The left ventricle is normal in size with mild concentric left ventricular  hypertrophy.  Left ventricular function is normal.The ejection fraction is 55-60%.  Normal right ventricle size and systolic function.  Sclerodegenerative valve disease is present without hemodynamically  significant stenosis or regurgitation.         Assessment/Recommendations   Assessment:    Mr. Ashok Garcia is a 83 year old male with a significant past history of permanent afib, CAD s/p CABG, HLD, HTN, chronic HFpEF with declined in LVEF in 11/2023 during hospitalization,  who presents for routine follow up.     Permanent afib   - Dates back to at least 2016   -  Will not make any attempts at sinus rhythm   - Cont dabigatran for elevated CHADS-Vasc of 5   - Rate control is adequate    LV dysfunction possible stress CM   - Euvolemic today.  Wears compression stockings.   - LVEF noted to be 40-45% while hospitalized for COVID and aspiration pna.  Noted to be a difficult study   - Repeat TTE.  If continues to be depressed discuss whether or not to pursue LHC vs. Intensification of GDMT.  He is not sure at the moment if he would want to pursue LHC   - Cont losartan.  Not on BB due to bradycardia   - Cont lasix    CAD   - No anginal symptoms   - Cont simvastatin last LDL 44    RTC 6 months or sooner if repeat TTE abnormal    The longitudinal plan of care for afib was addressed during this visit. Due to the added complexity in care, I will continue to support Ashok Garcia  in the subsequent management of this condition(s) and with the ongoing continuity of care of this condition(s).           Haja Ochoa, DO [facility]  Non-invasive Cardiologist  [facility]

## 2024-04-29 ENCOUNTER — HOSPITAL ENCOUNTER (OUTPATIENT)
Dept: CARDIOLOGY | Facility: HOSPITAL | Age: 84
Discharge: HOME | End: 2024-04-29
Attending: STUDENT IN AN ORGANIZED HEALTH CARE EDUCATION/TRAINING PROGRAM | Admitting: STUDENT IN AN ORGANIZED HEALTH CARE EDUCATION/TRAINING PROGRAM
Payer: MEDICARE

## 2024-04-29 DIAGNOSIS — I51.9 LV DYSFUNCTION: ICD-10-CM

## 2024-04-29 LAB — LVEF ECHO: NORMAL

## 2024-04-29 PROCEDURE — 93306 TTE W/DOPPLER COMPLETE: CPT

## 2024-04-29 PROCEDURE — 93306 TTE W/DOPPLER COMPLETE: CPT | Mod: 26 | Performed by: INTERNAL MEDICINE

## 2024-05-26 DIAGNOSIS — I48.91 ATRIAL FIBRILLATION (H): ICD-10-CM

## 2024-05-29 RX ORDER — DABIGATRAN ETEXILATE 150 MG/1
150 CAPSULE ORAL EVERY 12 HOURS
Qty: 180 CAPSULE | Refills: 0 | Status: SHIPPED | OUTPATIENT
Start: 2024-05-29 | End: 2024-08-30

## 2024-05-30 ENCOUNTER — TELEPHONE (OUTPATIENT)
Dept: CARDIOLOGY | Facility: CLINIC | Age: 84
End: 2024-05-30

## 2024-05-30 NOTE — TELEPHONE ENCOUNTER
[facility] requesting Prior Auth for medication.    PA team please help to determine PA for Dabigatran 150 mg every 12 hours. Thank you CMM,Rn

## 2024-06-07 NOTE — TELEPHONE ENCOUNTER
PA Initiation    Medication: PRADAXA 150 MG PO CAPS  Insurance Company: [employer] - Phone [phone] Fax [phone]  Pharmacy Filling the Rx: [id number] - [address]  AT [facility] [address]   Filling Pharmacy Phone: [phone]  Filling Pharmacy Fax:    Start Date: 6/7/2024

## 2024-06-11 NOTE — TELEPHONE ENCOUNTER
PRIOR AUTHORIZATION DENIED    Medication: PRADAXA 150 MG PO CAPS  Insurance Company: [employer] - Phone [phone] Fax [phone]  Denial Date: 6/11/2024  Denial Reason(s): Patient doesn't have Medicare Part D coverage only Supplemental      Appeal Information: N/A  Patient Notified: No

## 2024-06-13 NOTE — TELEPHONE ENCOUNTER
===View-only below this line===  ----- Message -----  From: Camilo Bedoya  Sent: 6/11/2024   1:10 PM CDT  To: Aaron Washington RN    ----- Message from Camilo Bedoya sent at 6/11/2024  1:10 PM CDT -----  Hello- Insurance kicked back that Patient doesn't have active Drug Coverage only Supplemental coverage. There for this medication is not covered and patient will need to use a discount card to help pay for it. Please follow up with him and close encounter when finished.  Thank You!  Stacy Bedoya CPhT  Prior Auth Specialist

## 2024-06-18 ENCOUNTER — TELEPHONE (OUTPATIENT)
Dept: CARDIOLOGY | Facility: CLINIC | Age: 84
End: 2024-06-18
Payer: MEDICARE

## 2024-06-18 DIAGNOSIS — I25.10 CORONARY ATHEROSCLEROSIS: ICD-10-CM

## 2024-06-18 DIAGNOSIS — I50.32 CHF (CONGESTIVE HEART FAILURE), NYHA CLASS II, CHRONIC, DIASTOLIC (H): ICD-10-CM

## 2024-06-18 DIAGNOSIS — E78.00 HYPERCHOLESTEROLEMIA: ICD-10-CM

## 2024-06-18 RX ORDER — LOSARTAN POTASSIUM 50 MG/1
50 TABLET ORAL DAILY
Qty: 90 TABLET | Refills: 1 | Status: SHIPPED | OUTPATIENT
Start: 2024-06-18

## 2024-06-18 RX ORDER — SIMVASTATIN 40 MG
40 TABLET ORAL AT BEDTIME
Qty: 90 TABLET | Refills: 1 | Status: SHIPPED | OUTPATIENT
Start: 2024-06-18

## 2024-06-18 RX ORDER — FUROSEMIDE 40 MG
40 TABLET ORAL 2 TIMES DAILY
Qty: 180 TABLET | Refills: 1 | Status: SHIPPED | OUTPATIENT
Start: 2024-06-18

## 2024-06-18 NOTE — TELEPHONE ENCOUNTER
Losartan, simvastatin and furosemide prescribed by cardiology. OV notes states to continue all 3. Prescriptions sent  to requested pharmacy. SRINIVASRn

## 2024-06-18 NOTE — TELEPHONE ENCOUNTER
[facility] Call Center    Phone Message    May a detailed message be left on voicemail: yes     Reason for Call: Medication Refill Request    Has the patient contacted the pharmacy for the refill? Yes   Name of medication being requested:   losartan (COZAAR) 50 MG tablet   Provider who prescribed the medication: Don Durand)  Pharmacy:    [facility] #[id number] [address]  AT [facility] [address]  Date medication is needed: 06/18/24    **Jeb will be out of his medication by Saturday, but would ideally like to pick this up today or tomorrow.  Also, he had an issue with getting this filled at the pharmacy because of his change in doctor's, so he would like to make sure that all his current medications that were originally prescribed by Dr. Martinez are now listed as being managed by Dr. Ochoa so that he runs into no further hiccups in the future**    Action Taken: Other: Cardiology    Travel Screening: Not Applicable    Thank you!  [facility]

## 2024-08-30 DIAGNOSIS — I48.91 ATRIAL FIBRILLATION (H): ICD-10-CM

## 2024-08-30 RX ORDER — DABIGATRAN ETEXILATE 150 MG/1
150 CAPSULE ORAL EVERY 12 HOURS
Qty: 60 CAPSULE | Refills: 1 | Status: SHIPPED | OUTPATIENT
Start: 2024-08-30

## 2024-10-28 DIAGNOSIS — I48.91 ATRIAL FIBRILLATION (H): ICD-10-CM

## 2024-10-28 RX ORDER — DABIGATRAN ETEXILATE 150 MG/1
150 CAPSULE ORAL EVERY 12 HOURS
Qty: 60 CAPSULE | Refills: 2 | Status: SHIPPED | OUTPATIENT
Start: 2024-10-28

## 2024-10-28 NOTE — TELEPHONE ENCOUNTER
Incoming fax request to refill Dabigatran 150 mg caps, 30 day supply with refills. Due for 6 month follow-up SM to schedulers to call and arrange.  SRINIVAS,Rn

## 2024-12-17 ENCOUNTER — TELEPHONE (OUTPATIENT)
Dept: CARDIOLOGY | Facility: CLINIC | Age: 84
End: 2024-12-17
Payer: MEDICARE

## 2024-12-17 DIAGNOSIS — I25.10 CORONARY ATHEROSCLEROSIS: ICD-10-CM

## 2024-12-17 RX ORDER — LOSARTAN POTASSIUM 50 MG/1
50 TABLET ORAL DAILY
Qty: 90 TABLET | Refills: 0 | Status: SHIPPED | OUTPATIENT
Start: 2024-12-17

## 2024-12-17 NOTE — TELEPHONE ENCOUNTER
[facility] Call Center    Phone Message    May a detailed message be left on voicemail: yes     Reason for Call: Other: Pt needs a refill of losartan. Pt scheduled follow up in Feb 2025. Please refill until pt's appt.      Action Taken: Other: [facility]    Travel Screening: Not Applicable     Date of Service:

## 2024-12-24 DIAGNOSIS — I50.32 CHF (CONGESTIVE HEART FAILURE), NYHA CLASS II, CHRONIC, DIASTOLIC (H): ICD-10-CM

## 2024-12-24 RX ORDER — FUROSEMIDE 40 MG/1
40 TABLET ORAL 2 TIMES DAILY
Qty: 180 TABLET | Refills: 0 | Status: SHIPPED | OUTPATIENT
Start: 2024-12-24

## 2024-12-30 DIAGNOSIS — I50.32 CHF (CONGESTIVE HEART FAILURE), NYHA CLASS II, CHRONIC, DIASTOLIC (H): ICD-10-CM

## 2024-12-30 RX ORDER — FUROSEMIDE 40 MG/1
40 TABLET ORAL 2 TIMES DAILY
Qty: 180 TABLET | Refills: 0 | Status: SHIPPED | OUTPATIENT
Start: 2024-12-30

## 2025-01-29 DIAGNOSIS — I48.91 ATRIAL FIBRILLATION (H): ICD-10-CM

## 2025-01-29 RX ORDER — DABIGATRAN ETEXILATE 150 MG/1
150 CAPSULE ORAL EVERY 12 HOURS
Qty: 60 CAPSULE | Refills: 1 | Status: SHIPPED | OUTPATIENT
Start: 2025-01-29

## 2025-03-04 ENCOUNTER — OFFICE VISIT (OUTPATIENT)
Dept: CARDIOLOGY | Facility: CLINIC | Age: 85
End: 2025-03-04
Payer: MEDICARE

## 2025-03-04 VITALS
HEART RATE: 76 BPM | SYSTOLIC BLOOD PRESSURE: 92 MMHG | BODY MASS INDEX: 29.55 KG/M2 | WEIGHT: 224 LBS | RESPIRATION RATE: 16 BRPM | DIASTOLIC BLOOD PRESSURE: 64 MMHG

## 2025-03-04 DIAGNOSIS — E78.00 HYPERCHOLESTEROLEMIA: ICD-10-CM

## 2025-03-04 DIAGNOSIS — I50.32 CHF (CONGESTIVE HEART FAILURE), NYHA CLASS II, CHRONIC, DIASTOLIC (H): Primary | ICD-10-CM

## 2025-03-04 DIAGNOSIS — I25.10 ATHEROSCLEROSIS OF NATIVE CORONARY ARTERY OF NATIVE HEART WITHOUT ANGINA PECTORIS: ICD-10-CM

## 2025-03-04 DIAGNOSIS — I48.21 PERMANENT ATRIAL FIBRILLATION (H): ICD-10-CM

## 2025-03-04 DIAGNOSIS — Z79.01 CHRONIC ANTICOAGULATION: ICD-10-CM

## 2025-03-04 PROCEDURE — 3074F SYST BP LT 130 MM HG: CPT | Performed by: STUDENT IN AN ORGANIZED HEALTH CARE EDUCATION/TRAINING PROGRAM

## 2025-03-04 PROCEDURE — G2211 COMPLEX E/M VISIT ADD ON: HCPCS | Performed by: STUDENT IN AN ORGANIZED HEALTH CARE EDUCATION/TRAINING PROGRAM

## 2025-03-04 PROCEDURE — 3078F DIAST BP <80 MM HG: CPT | Performed by: STUDENT IN AN ORGANIZED HEALTH CARE EDUCATION/TRAINING PROGRAM

## 2025-03-04 PROCEDURE — 99214 OFFICE O/P EST MOD 30 MIN: CPT | Performed by: STUDENT IN AN ORGANIZED HEALTH CARE EDUCATION/TRAINING PROGRAM

## 2025-03-04 RX ORDER — FUROSEMIDE 40 MG/1
40 TABLET ORAL DAILY
Qty: 90 TABLET | Refills: 3 | Status: SHIPPED | OUTPATIENT
Start: 2025-03-04

## 2025-03-04 RX ORDER — SIMVASTATIN 40 MG
40 TABLET ORAL AT BEDTIME
Qty: 90 TABLET | Refills: 3 | Status: SHIPPED | OUTPATIENT
Start: 2025-03-04

## 2025-03-04 RX ORDER — LOSARTAN POTASSIUM 50 MG/1
50 TABLET ORAL DAILY
Qty: 90 TABLET | Refills: 3 | Status: SHIPPED | OUTPATIENT
Start: 2025-03-04

## 2025-03-04 RX ORDER — DABIGATRAN ETEXILATE 150 MG/1
150 CAPSULE ORAL EVERY 12 HOURS
Qty: 180 CAPSULE | Refills: 3 | Status: SHIPPED | OUTPATIENT
Start: 2025-03-04

## 2025-03-04 NOTE — LETTER
3/4/2025    Stef Sims MD  [facility] [address]  [place] [address]    RE: Ashok Garcia       Dear Colleague,     I had the pleasure of seeing Ashok Garcia in the [facility].    [facility] HEART CARE   [address]  [place] [address]   [email or web address]   OFFICE: [phone]     CARDIOLOGY CLINIC NOTE     Thank you, Dr. Sims, Stef Brown, for asking the [facility] Heart Care team to see Mr. Ashok Garcia to evaluate Follow Up          History of Present Illness   Mr. Ashok Garcia is a 84 year old male with a significant past history of permanent afib, CAD s/p CABG, HLD, HTN, chronic HFpEF with declined in LVEF in 11/2023 during hospitalization and subsequent recovery, who presents for routine follow up.  The patient notes he has been doing well in general.  No new symptoms.  HE continues to have issues with neuropathy.  He is selling his house in [place] so no more wintering down there.  He notes no significant swelling.  He tries to elevate his feet.         Medications  Allergies   Current Outpatient Medications   Medication Sig Dispense Refill     cyanocobalamin (VITAMIN B-12) 1000 MCG tablet [CYANOCOBALAMIN (VITAMIN B-12) 1000 MCG TABLET] Take 1,000 mcg by mouth daily.       dabigatran ANTICOAGULANT (PRADAXA) 150 MG capsule Take 1 capsule (150 mg) by mouth every 12 hours. 180 capsule 3     doxylamine (UNISOM) 25 mg tablet [DOXYLAMINE (UNISOM) 25 MG TABLET] Take 25 mg by mouth at bedtime as needed for sleep.       folic acid (FOLVITE) 400 MCG tablet [FOLIC ACID (FOLVITE) 400 MCG TABLET] Take 400 mcg by mouth daily.       furosemide (LASIX) 40 MG tablet Take 1 tablet (40 mg) by mouth daily. 90 tablet 3     losartan (COZAAR) 50 MG tablet Take 1 tablet (50 mg) by mouth daily. 90 tablet 3     MAGNESIUM-OXIDE 400 (240 Mg) MG tablet TAKE 1 TABLET(400 MG) BY MOUTH DAILY 90 tablet 3      multivitamin therapeutic tablet [MULTIVITAMIN THERAPEUTIC TABLET] Take 1 tablet by mouth daily.       nitroGLYcerin (NITROSTAT) 0.4 MG sublingual tablet Place 1 tablet (0.4 mg) under the tongue every 5 minutes as needed for chest pain For chest pain place 1 tablet under the tongue every 5 minutes for 3 doses. If symptoms persist 5 minutes after 1st dose call 911. 25 tablet 4     omeprazole (PRILOSEC) 20 MG capsule [OMEPRAZOLE (PRILOSEC) 20 MG CAPSULE] Take 20 mg by mouth daily.       simvastatin (ZOCOR) 40 MG tablet Take 1 tablet (40 mg) by mouth at bedtime. 90 tablet 3     VIRTUSSIN AC  mg/5 mL liquid Take 1 teaspoonful by mouth every 6 hours as needed for cough   2      Allergies   Allergen Reactions     Nuts - Unspecified [Nuts] Unknown        Physical Examination Review of Systems   Vitals: BP 92/64 (BP Location: Left arm, Patient Position: Sitting, Cuff Size: Adult Large)   Pulse 76   Resp 16   Wt 101.6 kg (224 lb)   BMI 29.55 kg/m    BMI= Body mass index is 29.55 kg/m .  Wt Readings from Last 3 Encounters:   03/04/25 101.6 kg (224 lb)   04/05/24 96.6 kg (213 lb)   05/18/23 93.9 kg (207 lb)       General: pleasant male. No acute distress.   Neck: No JVD  Lungs: clear to auscultation  COR:  irregular rate and rhythm, No murmurs, rubs, or gallops  Extrem: No edema        Please refer above for cardiac ROS details.       Past History     Family History: No family history on file.     Social History:   Social History     Socioeconomic History     Marital status:      Spouse name: Not on file     Number of children: Not on file     Years of education: Not on file     Highest education level: Not on file   Occupational History     Not on file   Tobacco Use     Smoking status: Never     Passive exposure: Past     Smokeless tobacco: Never   Substance and Sexual Activity     Alcohol use: Never     Drug use: Never     Sexual activity: Not on file   Other Topics Concern     Parent/sibling w/ CABG, MI or  angioplasty before 65F 55M? Not Asked   Social History Narrative     Not on file     Social Drivers of Health     Financial Resource Strain: Low Risk  (11/1/2023)    Received from [employer] [facility], [facility]    Financial Resource Strain      Difficulty of Paying Living Expenses: 3      Difficulty of Paying Living Expenses: Not on file   Food Insecurity: No Food Insecurity (11/1/2023)    Received from [facility] [employer] [facility]    Food Insecurity      Do you worry your food will run out before you are able to buy more?: 1   Transportation Needs: No Transportation Needs (11/1/2023)    [facility] [employer] [facility] [facility], [facility] [employer] [facility]    Transportation Needs      Lack of Transportation (Medical): 1   Physical Activity: Not on file   Stress: Not on file   Social Connections: Socially Integrated (11/1/2023)    Received from [employer] [facility], [email or web address] [facility]    Social Connections      Frequency of Communication with Friends and Family: 0   Interpersonal Safety: Not At Risk (6/22/2023)    Received from [facility] [employer]     IP Custom IPV      Do you feel UNSAFE in any of your personal relationships with your family members or any other acquaintances?: No   Housing Stability: Low Risk  (11/1/2023)    [facility] from [facility]    Housing Stability      What is your housing situation today?: 1            Lab Results    Chemistry/lipid CBC Cardiac Enzymes/BNP/TSH/INR   Lab Results   Component Value Date    CHOL 126 05/23/2023    HDL 57 05/23/2023    TRIG 125 05/23/2023    BUN 17.5 05/23/2023     05/23/2023    CO2 27 05/23/2023    Lab Results   Component Value Date    WBC 8.7 09/23/2021    HGB 12.0 (L) 09/23/2021    HCT 38.3 (L) 09/23/2021     (H) 09/23/2021      09/23/2021    Lab Results   Component Value Date    TROPONINI 0.02 09/16/2021     (H) 07/07/2021    TSH 2.69 06/15/2018    INR 1.35 (H) 09/18/2021          Cardiac Problems and Cardiac Diagnostics     Most Recent Cardiac testing:  ECG Personal interpretation  10/31/2023  Afib, PVCs  RBBB, LAD     TTE 4/29/2024  Interpretation Summary     Left ventricular size, wall motion and function are normal. The ejection  fraction is 55-60%.  Grade III or advanced diastolic dysfunction.  Not all segments are well visualized. The patient defused to use the Definity  contrast.  The right ventricle is normal size.  Moderately decreased right ventricular systolic function  The left atrium is moderately dilated.  There is mild (1+) mitral regurgitation.  There is mild (1+) tricuspid regurgitation. The estimated pulmonary artery  systolic pressure 53 mmHg.    HOLTER REPORT 8/3/2021  Results:  Indication for study: Atrial fibrillation  The predominant rhythm throughout the tracing was atrial fibrillation with an average ventricular response of 69 bpm.  The heart rate response to activities of daily living appears to be well controlled.  The QRS duration was prolonged with a IVCD (apparently RBBB by derived twelve-lead EKG).  The QT interval was normal.  The study demonstrated no significant bradycardia/pauses.  The patient demonstrated moderately frequent ventricular ectopy accounting for 4% of the total ventricular depolarizations.  Complex ventricular ectopy was observed on 1 occasion with a 5 beat run of slow idioventricular rhythm (85 bpm).  Sustained ventricular tachycardia was not observed.  The patient return a diary.  No cardiac symptoms were reported     Impression:  Abnormal Holter monitor tracing by virtue of the finding of persistent atrial fibrillation throughout the recording.  Ventricular response in atrial fibrillation appears to be well controlled.  Conduction system disease is present with the manifest  IVCD (probably RBBB).  Increased burden of PVCs and 1 short run of idioventricular rhythm.  This did not appear to be symptomatic and the tracings do not appear overly worrisome.  No significant bradycardia or pauses.       Assessment/Recommendations   Assessment:    Mr. Ashok Garcia is a 84 year old male with a significant past history of permanent afib, CAD s/p CABG, HLD, HTN, chronic HFpEF with declined in LVEF in 11/2023 during hospitalization and subsequent recovery, who presents for routine follow up.      Permanent afib   - Dates back to at least 2016   - Will not make any attempts at sinus rhythm   - Cont dabigatran for elevated CHADS-Vasc of 5   - Rate control is adequate     Chronic HFpEF   - Euvolemic today.  Wears compression stockings.   - Cont losartan.  Not on BB due to bradycardia   - Cont lasix - can trial a decrease to 40mg daily.     - Can consider SGLT2 at next visit     CAD   - No anginal symptoms   - Cont simvastatin last LDL 44     RTC 6 months    The longitudinal plan of care for the diagnosis(es)/condition(s) as documented were addressed during this visit. Due to the added complexity in care, I will continue to support Ashok in the subsequent management and with ongoing continuity of care.           Haja Ochoa DO [facility]  Non-invasive Cardiologist  [facility] Heart Care         Thank you for allowing me to participate in the care of your patient.      Sincerely,     Haja Ochoa DO     [facility] Heart Care  cc:   Haja Ochoa DO  [address]  [place] [address]

## 2025-03-04 NOTE — PROGRESS NOTES
[facility] HEART CARE   [address]  [place] [address]   [email or web address]   OFFICE: [phone]     CARDIOLOGY CLINIC NOTE     Thank you, Dr. Sims, Stef Brown, for asking the [facility] Heart Care team to see Mr. Ashok Garcia to evaluate Follow Up          History of Present Illness   Mr. Ashok Garcia is a 84 year old male with a significant past history of permanent afib, CAD s/p CABG, HLD, HTN, chronic HFpEF with declined in LVEF in 11/2023 during hospitalization and subsequent recovery, who presents for routine follow up.  The patient notes he has been doing well in general.  No new symptoms.  HE continues to have issues with neuropathy.  He is selling his house in [place] so no more wintering down there.  He notes no significant swelling.  He tries to elevate his feet.         Medications  Allergies   Current Outpatient Medications   Medication Sig Dispense Refill    cyanocobalamin (VITAMIN B-12) 1000 MCG tablet [CYANOCOBALAMIN (VITAMIN B-12) 1000 MCG TABLET] Take 1,000 mcg by mouth daily.      dabigatran ANTICOAGULANT (PRADAXA) 150 MG capsule Take 1 capsule (150 mg) by mouth every 12 hours. 180 capsule 3    doxylamine (UNISOM) 25 mg tablet [DOXYLAMINE (UNISOM) 25 MG TABLET] Take 25 mg by mouth at bedtime as needed for sleep.      folic acid (FOLVITE) 400 MCG tablet [FOLIC ACID (FOLVITE) 400 MCG TABLET] Take 400 mcg by mouth daily.      furosemide (LASIX) 40 MG tablet Take 1 tablet (40 mg) by mouth daily. 90 tablet 3    losartan (COZAAR) 50 MG tablet Take 1 tablet (50 mg) by mouth daily. 90 tablet 3    MAGNESIUM-OXIDE 400 (240 Mg) MG tablet TAKE 1 TABLET(400 MG) BY MOUTH DAILY 90 tablet 3    multivitamin therapeutic tablet [MULTIVITAMIN THERAPEUTIC TABLET] Take 1 tablet by mouth daily.      nitroGLYcerin (NITROSTAT) 0.4 MG sublingual tablet Place 1 tablet (0.4 mg) under the tongue every 5 minutes as needed for chest pain For chest pain place 1  tablet under the tongue every 5 minutes for 3 doses. If symptoms persist 5 minutes after 1st dose call 911. 25 tablet 4    omeprazole (PRILOSEC) 20 MG capsule [OMEPRAZOLE (PRILOSEC) 20 MG CAPSULE] Take 20 mg by mouth daily.      simvastatin (ZOCOR) 40 MG tablet Take 1 tablet (40 mg) by mouth at bedtime. 90 tablet 3    VIRTUSSIN AC  mg/5 mL liquid Take 1 teaspoonful by mouth every 6 hours as needed for cough   2      Allergies   Allergen Reactions    Nuts - Unspecified [Nuts] Unknown        Physical Examination Review of Systems   Vitals: BP 92/64 (BP Location: Left arm, Patient Position: Sitting, Cuff Size: Adult Large)   Pulse 76   Resp 16   Wt 101.6 kg (224 lb)   BMI 29.55 kg/m    BMI= Body mass index is 29.55 kg/m .  Wt Readings from Last 3 Encounters:   03/04/25 101.6 kg (224 lb)   04/05/24 96.6 kg (213 lb)   05/18/23 93.9 kg (207 lb)       General: pleasant male. No acute distress.   Neck: No JVD  Lungs: clear to auscultation  COR:  irregular rate and rhythm, No murmurs, rubs, or gallops  Extrem: No edema        Please refer above for cardiac ROS details.       Past History     Family History: No family history on file.     Social History:   Social History     Socioeconomic History    Marital status:      Spouse name: Not on file    Number of children: Not on file    Years of education: Not on file    Highest education level: Not on file   Occupational History    Not on file   Tobacco Use    Smoking status: Never     Passive exposure: Past    Smokeless tobacco: Never   Substance and Sexual Activity    Alcohol use: Never    Drug use: Never    Sexual activity: Not on file   Other Topics Concern    Parent/sibling w/ CABG, MI or angioplasty before 65F 55M? Not Asked   Social History Narrative    Not on file     Social Drivers of Health     Financial Resource Strain: Low Risk  (11/1/2023)    [facility] [employer] [email or web address] [employer] [facility] [employer] [facility]     Financial Resource Strain     Difficulty of Paying Living Expenses: 3     Difficulty of Paying Living Expenses: Not on file   Food Insecurity: No Food Insecurity (11/1/2023)    [facility]    Food Insecurity     Do you worry your food will run out before you are able to buy more?: 1   Transportation Needs: No Transportation Needs (11/1/2023)    Received from [employer] [facility], [facility] [employer] [facility]    Transportation Needs     Lack of Transportation (Medical): 1   Physical Activity: Not on file   Stress: Not on file   Social Connections: Socially Integrated (11/1/2023)    Received from [employer] [facility] [employer] [facility], [employer] [facility]    Social Connections     Frequency of Communication with Friends and Family: 0   Interpersonal Safety: Not At Risk (6/22/2023)    Received from [facility] and [facility] [employer]     Do you feel UNSAFE in any of your personal relationships with your family members or any other acquaintances?: No   Housing Stability: Low Risk  (11/1/2023)    Received from [employer] [facility]    Housing Stability     What is your housing situation today?: 1            Lab Results    Chemistry/lipid CBC Cardiac Enzymes/BNP/TSH/INR   Lab Results   Component Value Date    CHOL 126 05/23/2023    HDL 57 05/23/2023    TRIG 125 05/23/2023    BUN 17.5 05/23/2023     05/23/2023    CO2 27 05/23/2023    Lab Results   Component Value Date    WBC 8.7 09/23/2021    HGB 12.0 (L) 09/23/2021    HCT 38.3 (L) 09/23/2021     (H) 09/23/2021     09/23/2021    Lab Results   Component Value Date    TROPONINI 0.02 09/16/2021     (H) 07/07/2021    TSH 2.69 06/15/2018    INR 1.35 (H) 09/18/2021          Cardiac Problems and Cardiac Diagnostics     Most Recent Cardiac testing:  ECG Personal  interpretation  10/31/2023  Afib, PVCs  RBBB, LAD     TTE 4/29/2024  Interpretation Summary     Left ventricular size, wall motion and function are normal. The ejection  fraction is 55-60%.  Grade III or advanced diastolic dysfunction.  Not all segments are well visualized. The patient defused to use the Definity  contrast.  The right ventricle is normal size.  Moderately decreased right ventricular systolic function  The left atrium is moderately dilated.  There is mild (1+) mitral regurgitation.  There is mild (1+) tricuspid regurgitation. The estimated pulmonary artery  systolic pressure 53 mmHg.    HOLTER REPORT 8/3/2021  Results:  Indication for study: Atrial fibrillation  The predominant rhythm throughout the tracing was atrial fibrillation with an average ventricular response of 69 bpm.  The heart rate response to activities of daily living appears to be well controlled.  The QRS duration was prolonged with a IVCD (apparently RBBB by derived twelve-lead EKG).  The QT interval was normal.  The study demonstrated no significant bradycardia/pauses.  The patient demonstrated moderately frequent ventricular ectopy accounting for 4% of the total ventricular depolarizations.  Complex ventricular ectopy was observed on 1 occasion with a 5 beat run of slow idioventricular rhythm (85 bpm).  Sustained ventricular tachycardia was not observed.  The patient return a diary.  No cardiac symptoms were reported     Impression:  Abnormal Holter monitor tracing by virtue of the finding of persistent atrial fibrillation throughout the recording.  Ventricular response in atrial fibrillation appears to be well controlled.  Conduction system disease is present with the manifest IVCD (probably RBBB).  Increased burden of PVCs and 1 short run of idioventricular rhythm.  This did not appear to be symptomatic and the tracings do not appear overly worrisome.  No significant bradycardia or pauses.       Assessment/Recommendations    Assessment:    Mr. Ashok Garcia is a 84 year old male with a significant past history of permanent afib, CAD s/p CABG, HLD, HTN, chronic HFpEF with declined in LVEF in 11/2023 during hospitalization and subsequent recovery, who presents for routine follow up.      Permanent afib   - Dates back to at least 2016   - Will not make any attempts at sinus rhythm   - Cont dabigatran for elevated CHADS-Vasc of 5   - Rate control is adequate     Chronic HFpEF   - Euvolemic today.  Wears compression stockings.   - Cont losartan.  Not on BB due to bradycardia   - Cont lasix - can trial a decrease to 40mg daily.     - Can consider SGLT2 at next visit     CAD   - No anginal symptoms   - Cont simvastatin last LDL 44     RTC 6 months    The longitudinal plan of care for the diagnosis(es)/condition(s) as documented were addressed during this visit. Due to the added complexity in care, I will continue to support Ashok in the subsequent management and with ongoing continuity of care.           Haja Ochoa DO [facility]  Non-invasive Cardiologist  [facility]

## 2025-03-04 NOTE — PATIENT INSTRUCTIONS
It was a pleasure meeting you today    In summary:    We will reduce the lasix to once a day.      Please call my nurse Aaron Washington at [phone] if you have any questions or issues.    We will schedule a follow up visit in  6 months      Haja Ochoa DO [facility]  Non-invasive Cardiologist  [facility]

## (undated) DEVICE — DRSG GAUZE 4X4" TOPPER

## (undated) DEVICE — CUSTOM PACK LAP CHOLE SBA5BLCHEA

## (undated) DEVICE — SYSTEM CLEARIFY VISUALIZATION 21-345

## (undated) DEVICE — TUBING SMOKE EVAC PNEUMOCLEAR HIGH FLOW 0620050250

## (undated) DEVICE — DRSG TEGADERM 2 3/8X2 3/4" 1624W

## (undated) DEVICE — DRESSING TEGADERM IV 2 3/4 X 3 1/4 1633

## (undated) DEVICE — ENDO TROCAR FIRST ENTRY KII FIOS Z-THRD 05X100MM CTF03

## (undated) DEVICE — DRSG TELFA 3X4" 1050

## (undated) DEVICE — SOL RINGERS LACTATED 1000ML BAG 2B2324X

## (undated) DEVICE — SOL WATER IRRIG 1000ML BOTTLE 2F7114

## (undated) DEVICE — SU MONOCRYL+ 4-0 18IN PS2 UND MCP496G

## (undated) DEVICE — PLATE GROUNDING ADULT W/CORD 9165L

## (undated) DEVICE — WIRE GUIDE 0.35X270CM STRAIGHT TIP VISIGLIDE G-260-3527S

## (undated) DEVICE — ENDO TROCAR SHIELDED BLADED KII Z-THRD 11X100MM CTB33

## (undated) DEVICE — SUTURE ENDO SURGITIE 21 POLYSORB EL23LN

## (undated) DEVICE — ENDO SHEARS RENEW LAP ENDOCUT SCISSOR TIP 16.5MM 3142

## (undated) DEVICE — DRAPE STERI INCISE 1050

## (undated) DEVICE — DECANTER VIAL 2006S

## (undated) DEVICE — ESU CORD MONOPOLAR 10'  E0510

## (undated) DEVICE — TUBING SUCTION MEDI-VAC 1/4"X20' N620A - HE

## (undated) DEVICE — CLIP LIGACLIP LG YELLOW LT400

## (undated) DEVICE — SUCTION MANIFOLD NEPTUNE 2 SYS 1 PORT 702-025-000

## (undated) DEVICE — NEEDLE BIOPSY TEMNO ACHIEVE CA1820

## (undated) DEVICE — DRAIN RESERVOIR 100ML JP 0070740

## (undated) DEVICE — ESU GROUND PAD ADULT REM W/15' CORD E7507DB

## (undated) DEVICE — TUBING LAP SUCT/IRRIG STRYKER 250070500

## (undated) DEVICE — ENDO POUCH UNIV RETRIEVAL SYSTEM INZII 10MM CD001

## (undated) DEVICE — BLADE KNIFE SURG 11 371111

## (undated) DEVICE — GLOVE BIOGEL PI ULTRATOUCH G SZ 8.0 42180

## (undated) DEVICE — ENDO TROCAR SLEEVE KII Z-THREADED 05X100MM CTS02

## (undated) DEVICE — GOWN XXL 9575

## (undated) DEVICE — ENDO FUSION OMNI-TOME G31903

## (undated) DEVICE — DRAIN BLAKE 19FR SIL 2231

## (undated) DEVICE — SU VICRYL+ 0 27 UR6 VLT VCP603H

## (undated) DEVICE — BALLOON EXTRACTION 15X1950MM 3.2MM TL B-V243Q-A

## (undated) DEVICE — CLIP APPLIER ENDO ROTATING 10MM MED/LG ER320

## (undated) DEVICE — Device

## (undated) DEVICE — PREP DURAPREP 26ML APL 8630

## (undated) RX ORDER — BUPIVACAINE HYDROCHLORIDE 5 MG/ML
INJECTION, SOLUTION EPIDURAL; INTRACAUDAL
Status: DISPENSED
Start: 2021-09-22

## (undated) RX ORDER — BUPIVACAINE HYDROCHLORIDE 2.5 MG/ML
INJECTION, SOLUTION EPIDURAL; INFILTRATION; INTRACAUDAL
Status: DISPENSED
Start: 2021-09-22